# Patient Record
Sex: FEMALE | Race: BLACK OR AFRICAN AMERICAN | Employment: UNEMPLOYED | URBAN - METROPOLITAN AREA
[De-identification: names, ages, dates, MRNs, and addresses within clinical notes are randomized per-mention and may not be internally consistent; named-entity substitution may affect disease eponyms.]

---

## 2018-08-10 ENCOUNTER — APPOINTMENT (OUTPATIENT)
Dept: GENERAL RADIOLOGY | Age: 48
End: 2018-08-10
Payer: SUBSIDIZED

## 2018-08-10 ENCOUNTER — HOSPITAL ENCOUNTER (EMERGENCY)
Age: 48
Discharge: HOME | End: 2018-08-10
Attending: EMERGENCY MEDICINE | Admitting: EMERGENCY MEDICINE
Payer: SUBSIDIZED

## 2018-08-10 VITALS
TEMPERATURE: 98 F | RESPIRATION RATE: 16 BRPM | BODY MASS INDEX: 44.3 KG/M2 | HEIGHT: 64 IN | DIASTOLIC BLOOD PRESSURE: 85 MMHG | WEIGHT: 259.48 LBS | SYSTOLIC BLOOD PRESSURE: 162 MMHG | HEART RATE: 72 BPM | OXYGEN SATURATION: 100 %

## 2018-08-10 DIAGNOSIS — M54.10 RADICULOPATHY OF LEG: ICD-10-CM

## 2018-08-10 DIAGNOSIS — M16.11 PRIMARY OSTEOARTHRITIS OF RIGHT HIP: ICD-10-CM

## 2018-08-10 DIAGNOSIS — M25.551 PAIN OF RIGHT HIP JOINT: Primary | ICD-10-CM

## 2018-08-10 PROCEDURE — 99283 EMERGENCY DEPT VISIT LOW MDM: CPT

## 2018-08-10 PROCEDURE — 74011250637 HC RX REV CODE- 250/637: Performed by: PHYSICIAN ASSISTANT

## 2018-08-10 PROCEDURE — 73502 X-RAY EXAM HIP UNI 2-3 VIEWS: CPT

## 2018-08-10 RX ORDER — KETOROLAC TROMETHAMINE 10 MG/1
10 TABLET, FILM COATED ORAL
Status: COMPLETED | OUTPATIENT
Start: 2018-08-10 | End: 2018-08-10

## 2018-08-10 RX ORDER — PREDNISONE 10 MG/1
TABLET ORAL
Qty: 21 TAB | Refills: 0 | Status: SHIPPED | OUTPATIENT
Start: 2018-08-10 | End: 2019-03-11

## 2018-08-10 RX ORDER — OXYCODONE HYDROCHLORIDE 5 MG/1
5 TABLET ORAL
Qty: 12 TAB | Refills: 0 | Status: SHIPPED | OUTPATIENT
Start: 2018-08-10 | End: 2019-03-11

## 2018-08-10 RX ORDER — MELOXICAM 15 MG/1
15 TABLET ORAL DAILY
Qty: 10 TAB | Refills: 0 | Status: SHIPPED | OUTPATIENT
Start: 2018-08-10 | End: 2018-08-20

## 2018-08-10 RX ADMIN — KETOROLAC TROMETHAMINE 10 MG: 10 TABLET, FILM COATED ORAL at 12:15

## 2018-08-10 NOTE — ED TRIAGE NOTES
Assumed care of pt from triage. Pt is A&O x 4. Pt reports CC of hip pain x 3 weeks. Pt denies any injury to hip. Pt reports she has been taking HA medicine without relief. Pt reports she can not get into PCP until Sept. Pt resting on stretcher in POC.

## 2018-08-10 NOTE — DISCHARGE INSTRUCTIONS
Hip Arthritis: Care Instructions  Your Care Instructions    Arthritis, also called osteoarthritis, is a breakdown of the tissue (cartilage) that cushions your joints. Many people have some arthritis as they age. When the cartilage in your hip joints wears down, your hip bone rubs against the hip socket. This causes pain and stiffness. Work with your doctor to find the right mix of treatments for your arthritis. There are things you can do at home to protect your hip joints, ease your pain, and help you stay active. But if your arthritis becomes so bad that you cannot walk, you may need surgery to replace the hip joint. Follow-up care is a key part of your treatment and safety. Be sure to make and go to all appointments, and call your doctor if you are having problems. It's also a good idea to know your test results and keep a list of the medicines you take. How can you care for yourself at home? · Stay at a healthy weight. Being overweight puts extra strain on your hip joints. · Talk to your doctor or physical therapist about exercises that will help ease hip pain. These tips may help. ¨ Stretch to help prevent stiffness and to prevent injury before you exercise. You may enjoy gentle forms of yoga to help keep your joints and muscles flexible. ¨ Walk instead of jog. Other types of exercise that are less stressful on the joints include riding a bike, swimming, and doing water exercise. ¨ Lift weights. Strong muscles help reduce stress on your joints. Stronger thigh muscles, for example, take some of the stress off of the knees and hips. Learn the right way to lift weights so you do not make joint pain worse. · Take pain medicines exactly as directed. ¨ If the doctor gave you a prescription medicine for pain, take it as prescribed. ¨ If you are not taking a prescription pain medicine, ask your doctor if you can take an over-the-counter medicine.   · Use a cane, crutch, walker, or another device if you need help to get around. These can help rest your hips. You also can use other things to make life easier, such as a higher toilet seat. · Do not sit in low chairs, which can make it painful to get up. · Put heat or cold on your sore hips as needed. Use whichever helps you most. You also can go back and forth between hot and cold packs. ¨ Apply heat 2 or 3 times a day for 20 to 30 minutes-using a heating pad, hot shower, or hot pack-to relieve pain and stiffness. ¨ Put ice or a cold pack on your sore hips for 10 to 20 minutes at a time to numb the area. Put a thin cloth between the ice and your skin. · Think about talking to your doctor about using capsaicin, a cream you apply to the skin for pain relief. When should you call for help? Call your doctor now or seek immediate medical care if:    · You have sudden swelling, warmth, or pain in any joint.     · You have joint pain and a fever or rash.     · You have such bad pain that you cannot use the joint.    Watch closely for changes in your health, and be sure to contact your doctor if:    · You have mild joint symptoms that continue even with more than 6 weeks of care at home.     · You do not get better as expected.     · You have stomach pain or other problems with your medicine. Where can you learn more? Go to http://sara-annabelle.info/. Enter U932 in the search box to learn more about \"Hip Arthritis: Care Instructions. \"  Current as of: October 10, 2017  Content Version: 11.7  © 6765-4549 [employer]. Care instructions adapted under license by [employer] (which disclaims liability or warranty for this information). If you have questions about a medical condition or this instruction, always ask your healthcare professional. [address] any warranty or liability for your use of this information.          Hip Arthritis: Exercises  Your Care Instructions  Here are some examples of exercises for hip arthritis. Start each exercise slowly. Ease off the exercise if you start to have pain. Your doctor or physical therapist will tell you when you can start these exercises and which ones will work best for you. How to do the exercises  Straight-leg raises to the outside    1. Lie on your side, with your affected hip on top. 2. Tighten the front thigh muscles of your top leg to keep your knee straight. 3. Keep your hip and your leg straight in line with the rest of your body, and keep your knee pointing forward. Do not drop your hip back. 4. Lift your top leg straight up toward the ceiling, about 12 inches off the floor. Hold for about 6 seconds, then slowly lower your leg. 5. Repeat 8 to 12 times. 6. Switch legs and repeat steps 1 through 5, even if only one hip is sore. Straight-leg raises to the inside    1. Lie on your side with your affected hip on the floor. 2. You can either prop up your other leg on a chair, or you can bend that knee and put that foot in front of your other knee. Do not drop your hip back. 3. Tighten the muscles on the front thigh of your bottom leg to straighten that knee. 4. Keep your kneecap pointing forward and your leg straight, and lift your bottom leg up toward the ceiling about 6 inches. Hold for about 6 seconds, then lower slowly. 5. Repeat 8 to 12 times. 6. Switch legs and repeat steps 1 through 5, even if only one hip is sore. Hip hike    1. Stand sideways on the bottom step of a staircase, and hold on to the banister or wall. 2. Keeping both knees straight, lift your good leg off the step and let it hang down. Then hike your good hip up to the same level as your affected hip or a little higher. 3. Repeat 8 to 12 times. 4. Switch legs and repeat steps 1 through 3, even if only one hip is sore. Bridging    1. Lie on your back with both knees bent. Your knees should be bent about 90 degrees.   2. Then push your feet into the floor, squeeze your buttocks, and lift your hips off the floor until your shoulders, hips, and knees are all in a straight line. 3. Hold for about 6 seconds as you continue to breathe normally, and then slowly lower your hips back down to the floor and rest for up to 10 seconds. 4. Repeat 8 to 12 times. Hamstring stretch (lying down)    1. Lie flat on your back with your legs straight. If you feel discomfort in your back, place a small towel roll under your lower back. 2. Holding the back of your affected leg, lift your leg straight up and toward your body until you feel a stretch at the back of your thigh. 3. Hold the stretch for at least 30 seconds. 4. Repeat 2 to 4 times. 5. Switch legs and repeat steps 1 through 4, even if only one hip is sore. Standing quadriceps stretch    1. If you are not steady on your feet, hold on to a chair, counter, or wall. You can also lie on your stomach or your side to do this exercise. 2. Bend the knee of the leg you want to stretch, and reach behind you to grab the front of your foot or ankle with the hand on the same side. For example, if you are stretching your right leg, use your right hand. 3. Keeping your knees next to each other, pull your foot toward your buttock until you feel a gentle stretch across the front of your hip and down the front of your thigh. Your knee should be pointed directly to the ground, and not out to the side. 4. Hold the stretch for at least 15 to 30 seconds. 5. Repeat 2 to 4 times. 6. Switch legs and repeat steps 1 through 5, even if only one hip is sore. Hip rotator stretch    1. Lie on your back with both knees bent and your feet flat on the floor. 2. Put the ankle of your affected leg on your opposite thigh near your knee. 3. Use your hand to gently push your knee away from your body until you feel a gentle stretch around your hip. 4. Hold the stretch for 15 to 30 seconds. 5. Repeat 2 to 4 times.   6. Repeat steps 1 through 5, but this time use your hand to gently pull your knee toward your opposite shoulder. 7. Switch legs and repeat steps 1 through 6, even if only one hip is sore. Knee-to-chest    1. Lie on your back with your knees bent and your feet flat on the floor. 2. Bring your affected leg to your chest, keeping the other foot flat on the floor (or keeping the other leg straight, whichever feels better on your lower back). 3. Keep your lower back pressed to the floor. Hold for at least 15 to 30 seconds. 4. Relax, and lower the knee to the starting position. 5. Repeat 2 to 4 times. 6. Switch legs and repeat steps 1 through 5, even if only one hip is sore. 7. To get more stretch, put your other leg flat on the floor while pulling your knee to your chest.  Clamshell    1. Lie on your side, with your affected hip on top. Keep your feet and knees together and your knees bent. 2. Raise your top knee, but keep your feet together. Do not let your hips roll back. Your legs should open up like a clamshell. 3. Hold for 6 seconds. 4. Slowly lower your knee back down. Rest for 10 seconds. 5. Repeat 8 to 12 times. 6. Switch legs and repeat steps 1 through 5, even if only one hip is sore. Follow-up care is a key part of your treatment and safety. Be sure to make and go to all appointments, and call your doctor if you are having problems. It's also a good idea to know your test results and keep a list of the medicines you take. Where can you learn more? Go to http://sara-annabelle.info/. Enter S352 in the search box to learn more about \"Hip Arthritis: Exercises. \"  Current as of: November 29, 2017  Content Version: 11.7  © 2221-6600 [employer]. Care instructions adapted under license by [employer] (which disclaims liability or warranty for this information).  If you have questions about a medical condition or this instruction, always ask your healthcare professional. [employer] disclaims any warranty or liability for your use of this information.

## 2018-08-10 NOTE — ED PROVIDER NOTES
EMERGENCY DEPARTMENT HISTORY AND PHYSICAL EXAM      Date: 8/10/2018  Patient Name: Liv Kwan    History of Presenting Illness     Chief Complaint   Patient presents with    Hip Pain       History Provided By: Patient    HPI: Liv Kwan, 51 y.o. female presents to the Emergency Dept with c/o 3 week h/o R hip pain. Pt denied similar pain in the past. No h/o needing to see an Orthopedist.  Pt denied injury/strain. No change in her physical routine. No dysuria/hematuria. No abdominal/pelvic pain. No rash/lesion. Pt states the pain begins in her R hip and radiates into her R LE. Pt states she has attempted to medicate \"with medicine I take for my headaches\" without relief. Pt is o/w healthy without fever, chills, cough, congestion, ST, shortness of breath, chest pain, N/V/D. Chief Complaint: R hip  Duration: 3 Weeks  Timing:  Acute  Location: R hip  Quality: Aching  Severity: Moderate  Modifying Factors: pain worsens with movement/weight bearing/position changes  Associated Symptoms: denies any other associated signs or symptoms      There are no other complaints, changes, or physical findings at this time. PCP: None    Current Outpatient Prescriptions   Medication Sig Dispense Refill    meloxicam (MOBIC) 15 mg tablet Take 1 Tab by mouth daily for 10 days. 10 Tab 0    oxyCODONE IR (ROXICODONE) 5 mg immediate release tablet Take 1 Tab by mouth every eight (8) hours as needed for Pain for up to 12 doses.  Max Daily Amount: 15 mg. 12 Tab 0    predniSONE (STERAPRED DS) 10 mg dose pack Take as directed 21 Tab 0       Past History     Past Medical History:  Past Medical History:   Diagnosis Date    Chronic kidney disease     kidney failure-left    Depression     GERD (gastroesophageal reflux disease)     Grave's disease     Hypertension     Hypothyroidism following radioiodine therapy     2009    Liver disease     Morbid obesity ([facility] [address]     Nausea & vomiting     Obstructive sleep apnea (adult) (pediatric) 1/8/2013    Thyroid disease        Past Surgical History:  Past Surgical History:   Procedure Laterality Date    HX CHOLECYSTECTOMY      HX GYN      novasure    HX ORTHOPAEDIC      Left arm    HX UROLOGICAL      bladder sling       Family History:  Family History   Problem Relation Age of Onset    Hypertension Mother     Hypertension Father     Other Father     Heart Attack Father     Diabetes Sister     Hypertension Brother        Social History:  Social History   Substance Use Topics    Smoking status: Current Some Day Smoker     Packs/day: 0.50     Last attempt to quit: 1/3/2014    Smokeless tobacco: Never Used    Alcohol use No      Comment: rare, 1 drink a month       Allergies: Allergies   Allergen Reactions    Ace Inhibitors Cough    Other Medication Unable to [employer] names, 1 for sinus,1 for bp and 1 for bladder pt states it is called \"sentura\"    Penicillins Rash    Sanctura [Trospium] Other (comments)    Vesicare [Solifenacin] Rash and Other (comments)         Review of Systems   Review of Systems   Constitutional: Negative for chills and fever. HENT: Negative for congestion, rhinorrhea and sore throat. Respiratory: Negative for cough and shortness of breath. Cardiovascular: Negative for chest pain and palpitations. Gastrointestinal: Negative for abdominal pain, diarrhea, nausea and vomiting. Endocrine: Negative for polydipsia, polyphagia and polyuria. Genitourinary: Negative for dysuria and hematuria. Musculoskeletal: Positive for arthralgias. Negative for neck pain and neck stiffness. Skin: Negative for rash and wound. Allergic/Immunologic: Negative for food allergies and immunocompromised state. Neurological: Positive for numbness. Negative for dizziness, weakness and headaches. See HPI   Psychiatric/Behavioral: Negative for agitation and confusion.        Physical Exam   Physical Exam   Constitutional: She is oriented to person, place, and time. She appears well-developed and well-nourished. No distress. WDWN AA female, alert, in NAD   HENT:   Head: Normocephalic and atraumatic. Nose: Nose normal.   Mouth/Throat: Oropharynx is clear and moist. No oropharyngeal exudate. Eyes: Conjunctivae and EOM are normal. Right eye exhibits no discharge. Left eye exhibits no discharge. No scleral icterus. Neck: Normal range of motion. Neck supple. No JVD present. No tracheal deviation present. No thyromegaly present. Cardiovascular: Normal rate, regular rhythm and normal heart sounds. Pulmonary/Chest: Effort normal and breath sounds normal. No respiratory distress. She has no wheezes. Abdominal: Soft. There is no tenderness. No CVAT   Musculoskeletal: She exhibits tenderness. She exhibits no edema. Decreased A/P ROM to R hip due to tenderness with palpation/movement, no deformity, no crepitus, no erythema/rash/lesion/heat. +SLR R, 2+ distal pulses, NVI, sensation grossly intact to light touch. Increased tenderness noted with position changes/ambulation. Lymphadenopathy:     She has no cervical adenopathy. Neurological: She is alert and oriented to person, place, and time. She exhibits normal muscle tone. Coordination normal.   Skin: Skin is warm and dry. No rash noted. She is not diaphoretic. No erythema. Psychiatric: She has a normal mood and affect. Her behavior is normal. Judgment normal.   Nursing note and vitals reviewed. Diagnostic Study Results     Labs -   No results found for this or any previous visit (from the past 12 hour(s)). Radiologic Studies -   XR HIP RT W OR WO PELV 2-3 VWS   Final Result           Kenneth Davis #[id number]  (48 y.o.  F) 05             Lab Results      None       Imaging Results           XR HIP RT W OR WO PELV 2-3 VWS (Final result) Result time: 08/10/18 13:03:29     Final result by Stefan Parra Results In (08/10/18 13:01:44)     Initial Result:     Impression:     IMPRESSION:  No acute abnormality. Osteoarthritis.           Narrative:     EXAM:  XR HIP RT W OR WO PELV 2-3 VWS    INDICATION:   Right hip pain x3 weeks.      COMPARISON: None. FINDINGS: AP view of the pelvis and a frogleg lateral view of the right hip  demonstrate no fracture, dislocation or other acute abnormality.  There is  osteoarthritic marginal spurring and slight joint space narrowing.              ECG Results      None               Medical Decision Making   I am the first provider for this patient. I reviewed the vital signs, available nursing notes, past medical history, past surgical history, family history and social history. Vital Signs-Reviewed the patient's vital signs. Patient Vitals for the past 12 hrs:   Temp Pulse Resp BP SpO2   08/10/18 1146 98 °F (36.7 °C) 72 16 162/85 100 %           Records Reviewed: Nursing Notes, Old Medical Records, Previous Radiology Studies and Previous Laboratory Studies    Provider Notes (Medical Decision Making):   Strain, sciatica, spasm, SI joint dysfunction, OA, fx, DJD    ED Course:   Initial assessment performed. The patients presenting problems have been discussed, and they are in agreement with the care plan formulated and outlined with them. I have encouraged them to ask questions as they arise throughout their visit. DISCHARGE NOTE:  1330  The care plan has been outline with the patient and/or family, who verbally conveyed understanding and agreement. Available results have been reviewed. Patient and/or family understand the follow up plan as outlined and discharge instructions. Should their condition deterioration at any time after discharge the patient agrees to return, follow up sooner than outlined or seek medical assistance at the closest Emergency Room as soon as possible. Questions have been answered.  Discharge instructions and educational information regarding the patient's diagnosis as well a list of reasons why the patient would want to seek immediate medical attention, should their condition change, were reviewed directly with the patient/family     PLAN:  1. Discharge Medication List as of 8/10/2018  1:15 PM      START taking these medications    Details   meloxicam (MOBIC) 15 mg tablet Take 1 Tab by mouth daily for 10 days. , Print, Disp-10 Tab, R-0      oxyCODONE IR (ROXICODONE) 5 mg immediate release tablet Take 1 Tab by mouth every eight (8) hours as needed for Pain for up to 12 doses. Max Daily Amount: 15 mg., Print, Disp-12 Tab, R-0      predniSONE (STERAPRED DS) 10 mg dose pack Take as directed, Print, Disp-21 Tab, R-0           2. Follow-up Information     Follow up With Details Comments [address], [place]   [address] [address]  [facility]  [phone]      [facility] EMERGENCY DEPT  If symptoms worsen [address] [facility] [address]   [address] [id number] [phone]        Return to ED if worse     Diagnosis     Clinical Impression:   1. Pain of right hip joint    2. Primary osteoarthritis of right hip    3.  Radiculopathy of leg

## 2019-03-08 ENCOUNTER — OFFICE VISIT (OUTPATIENT)
Dept: INTERNAL MEDICINE CLINIC | Age: 49
End: 2019-03-08

## 2019-03-08 VITALS
TEMPERATURE: 98.6 F | DIASTOLIC BLOOD PRESSURE: 90 MMHG | HEIGHT: 64 IN | BODY MASS INDEX: 44.39 KG/M2 | HEART RATE: 60 BPM | WEIGHT: 260 LBS | RESPIRATION RATE: 19 BRPM | SYSTOLIC BLOOD PRESSURE: 166 MMHG | OXYGEN SATURATION: 97 %

## 2019-03-08 DIAGNOSIS — G47.33 OSA (OBSTRUCTIVE SLEEP APNEA): ICD-10-CM

## 2019-03-08 DIAGNOSIS — E66.01 MORBID OBESITY (HCC): ICD-10-CM

## 2019-03-08 DIAGNOSIS — M15.9 GENERALIZED OA: ICD-10-CM

## 2019-03-08 DIAGNOSIS — Z76.89 ESTABLISHING CARE WITH NEW DOCTOR, ENCOUNTER FOR: Primary | ICD-10-CM

## 2019-03-08 DIAGNOSIS — N18.30 CHRONIC RENAL DISEASE, STAGE III (HCC): ICD-10-CM

## 2019-03-08 DIAGNOSIS — I12.9 HYPERTENSION, RENAL DISEASE: ICD-10-CM

## 2019-03-08 DIAGNOSIS — E55.9 VITAMIN D DEFICIENCY: ICD-10-CM

## 2019-03-08 DIAGNOSIS — E89.0 HYPOTHYROIDISM FOLLOWING RADIOIODINE THERAPY: ICD-10-CM

## 2019-03-08 DIAGNOSIS — Z12.39 BREAST CANCER SCREENING: ICD-10-CM

## 2019-03-08 RX ORDER — IBUPROFEN 800 MG/1
800 TABLET ORAL
Qty: 60 TAB | Refills: 1 | Status: SHIPPED | OUTPATIENT
Start: 2019-03-08 | End: 2019-08-05 | Stop reason: SDUPTHER

## 2019-03-08 RX ORDER — LEVOTHYROXINE SODIUM 300 UG/1
300 TABLET ORAL
Qty: 60 TAB | Refills: 2 | Status: SHIPPED | OUTPATIENT
Start: 2019-03-08 | End: 2020-08-05 | Stop reason: SDUPTHER

## 2019-03-08 RX ORDER — INDAPAMIDE 2.5 MG/1
2.5 TABLET, FILM COATED ORAL DAILY
Qty: 60 TAB | Refills: 2 | Status: SHIPPED | OUTPATIENT
Start: 2019-03-08 | End: 2019-04-19 | Stop reason: SDUPTHER

## 2019-03-08 RX ORDER — LEVOTHYROXINE SODIUM 25 UG/1
25 TABLET ORAL
Qty: 60 TAB | Refills: 2 | Status: SHIPPED | OUTPATIENT
Start: 2019-03-08 | End: 2019-06-05 | Stop reason: SDUPTHER

## 2019-03-08 NOTE — PROGRESS NOTES
Yvette Emanuel is a 51 y.o. female and presents with Establish Care  . Subjective:  First visit. Establish Care    PMH-HTN-not on any meds currently. Was controlled, in the past, on indapamide   Hypothyrtoidism   LUCINA-not using CPAP due to its age(from )   ckd 3   Vit d def    PSH-rt elbow surgery   S/p cholecystectomy   BTL   Bladder lift    SH-   Not currently sex active   +tob ~ 1/2 ppd since recently, no alcohol, no illicit drugs   Lives w 2 children and grandchildren 2 and 1 brother (they live w her)    [facility]- mother alive 80 HTN/ emphysema/CHF   Father alive 80 s/p amputation for infection/CAD/CVA/HTN   6 siblings 1 sister killed, 1 sister  DM complications, 1 sister  infx/SLE +HTN/heart dz, DM,   OA    HM  mammo ? ? Colonoscopy ? ? Immunizations declines  Eye care  Dental care  Pap ?? Review of Systems  Constitutional: negative for fevers, chills, anorexia and weight loss  Eyes:   negative for visual disturbance and irritation  ENT:   negative for tinnitus,sore throat,nasal congestion,ear pains. hoarseness  Respiratory:  negative for cough, hemoptysis, dyspnea,wheezing  CV:   negative for chest pain, palpitations, lower extremity edema  GI:   negative for nausea, vomiting, diarrhea, abdominal pain,melena  Musculoskel: negative for myalgias, arthralgias, back pain, muscle weakness, joint pain  Neurological:  negative for headaches, dizziness, vertigo, memory problems and gait   Behavl/Psych: negative for feelings of anxiety, depression, mood changes    Past Medical History:   Diagnosis Date    Chronic kidney disease     kidney failure-left    Depression     GERD (gastroesophageal reflux disease)     Grave's disease     Hypertension     Hypothyroidism following radioiodine therapy     2009    Liver disease     Morbid obesity ([facility] [address]     Nausea & vomiting     Obstructive sleep apnea (adult) (pediatric) 2013    Thyroid disease      Past Surgical History:   Procedure Laterality Date    HX CHOLECYSTECTOMY      HX GYN      novasure    HX ORTHOPAEDIC      Left arm    HX UROLOGICAL      bladder sling     Social History     Socioeconomic History    Marital status:      Spouse name: Not on file    Number of children: Not on file    Years of education: Not on file    Highest education level: Not on file   Tobacco Use    Smoking status: Current Some Day Smoker     Packs/day: 0.50     Last attempt to quit: 1/3/2014     Years since quittin.1    Smokeless tobacco: Never Used   Substance and Sexual Activity    Alcohol use: No     Comment: rare, 1 drink a month    Drug use: No    Sexual activity: Yes     Partners: Male     Family History   Problem Relation Age of Onset    Hypertension Mother     Hypertension Father     Other Father     Heart Attack Father     Diabetes Sister     Hypertension Brother      Current Outpatient Medications   Medication Sig Dispense Refill    indapamide (LOZOL) 2.5 mg tablet Take 1 Tab by mouth daily. 60 Tab 2    levothyroxine (SYNTHROID) 300 mcg tablet Take 1 Tab by mouth Daily (before breakfast). 60 Tab 2    levothyroxine (SYNTHROID) 25 mcg tablet Take 1 Tab by mouth Daily (before breakfast). 60 Tab 2    ibuprofen (MOTRIN) 800 mg tablet Take 1 Tab by mouth every eight (8) hours as needed for Pain (pain).  TAKE WITH FOOD 60 Tab 1     Allergies   Allergen Reactions    Ace Inhibitors Cough    Other Medication Unable to [employer] names, 1 for sinus,1 for bp and 1 for bladder pt states it is called \"sentura\"    Penicillins Rash    Sanctura [Trospium] Other (comments)    Tudorza Pressair [Aclidinium Bromide] Swelling    Vesicare [Solifenacin] Rash and Other (comments)       Objective:  Visit Vitals  /90 (BP 1 Location: Left arm, BP Patient Position: Sitting)   Pulse 60   Temp 98.6 °F (37 °C) (Oral)   Resp 19   Ht 5' 3.5\" (1.613 m)   Wt 260 lb (117.9 kg)   SpO2 97%   BMI 45.33 kg/m²     Physical Exam: General appearance - alert, obese, and in no distress  Mental status - alert, oriented to person, place, and time  EYE-MAX, EOMI, corneas normal, no foreign bodies  ENT-ENT exam normal, no neck nodes or sinus tenderness  Mouth - mucous membranes moist, pharynx normal without lesions  Neck - supple, no significant adenopathy   Chest - clear to auscultation, no wheezes, rales or rhonchi, symmetric air entry   Heart - normal rate, regular rhythm, normal S1, S2, 2/6 systolicmurmur  Abdomen - soft, nontender, obese +bs  Ext-pobese  Skin-Warm and dry. no hyperpigmentation, vitiligo, or suspicious lesions  Neuro -alert, oriented, normal speech, no focal findings or movement disorder noted      Results for orders placed or performed in visit on 03/08/19   LIPID PANEL   Result Value Ref Range    Cholesterol, total 204 (H) 100 - 199 mg/dL    Triglyceride 88 0 - 149 mg/dL    HDL Cholesterol 62 >39 mg/dL    VLDL, calculated 18 5 - 40 mg/dL    LDL, calculated 124 (H) 0 - 99 mg/dL   METABOLIC PANEL, COMPREHENSIVE   Result Value Ref Range    Glucose 77 65 - 99 mg/dL    BUN 13 6 - 24 mg/dL    Creatinine 1.22 (H) 0.57 - 1.00 mg/dL    GFR est non-AA 52 (L) >59 mL/min/1.73    GFR est AA 60 >59 mL/min/1.73    BUN/Creatinine ratio 11 9 - 23    Sodium 140 134 - 144 mmol/L    Potassium 4.3 3.5 - 5.2 mmol/L    Chloride 99 96 - 106 mmol/L    CO2 27 20 - 29 mmol/L    Calcium 9.6 8.7 - 10.2 mg/dL    Protein, total 7.0 6.0 - 8.5 g/dL    Albumin 4.6 3.5 - 5.5 g/dL    GLOBULIN, TOTAL 2.4 1.5 - 4.5 g/dL    A-G Ratio 1.9 1.2 - 2.2    Bilirubin, total 0.3 0.0 - 1.2 mg/dL    Alk.  phosphatase 56 39 - 117 IU/L    AST (SGOT) 20 0 - 40 IU/L    ALT (SGPT) 14 0 - 32 IU/L   CBC W/O DIFF   Result Value Ref Range    WBC 6.7 3.4 - 10.8 x10E3/uL    RBC 3.67 (L) 3.77 - 5.28 x10E6/uL    HGB 10.9 (L) 11.1 - 15.9 g/dL    HCT 32.9 (L) 34.0 - 46.6 %    MCV 90 79 - 97 fL    MCH 29.7 26.6 - 33.0 pg    MCHC 33.1 31.5 - 35.7 g/dL    RDW 15.5 (H) 12.3 - 15.4 % PLATELET 340 833 - 636 x10E3/uL   HEMOGLOBIN A1C WITH EAG   Result Value Ref Range    Hemoglobin A1c 5.2 4.8 - 5.6 %    Estimated average glucose 103 mg/dL   TSH REFLEX TO T4   Result Value Ref Range    TSH 35.000 (H) 0.450 - 4.500 uIU/mL   CKD REPORT   Result Value Ref Range    Interpretation Note    VITAMIN D, 25 HYDROXY   Result Value Ref Range    VITAMIN D, 25-HYDROXY 14.4 (L) 30.0 - 100.0 ng/mL   THYROXINE (T4)   Result Value Ref Range    T4, Total 3.1 (L) 4.5 - 12.0 ug/dL   CVD REPORT   Result Value Ref Range    INTERPRETATION Note     PDF IMAGE Not applicable        Assessment/Plan:    ICD-10-CM ICD-9-CM    1. Establishing care with new doctor, encounter for Z76.89 V65.8    2. LUCINA (obstructive sleep apnea) G47.33 327.23 REFERRAL TO SLEEP STUDIES   3. Hypertension, renal disease I12.9 403.90 indapamide (LOZOL) 2.5 mg tablet     585.9 LIPID PANEL      METABOLIC PANEL, COMPREHENSIVE      CBC W/O DIFF   4. Chronic renal disease, stage III (HCC) N18.3 585.3    5. Hypothyroidism following radioiodine therapy E89.0 244.1 levothyroxine (SYNTHROID) 300 mcg tablet      levothyroxine (SYNTHROID) 25 mcg tablet      TSH REFLEX TO T4   6. Morbid obesity ([address] E66.01 278.01 HEMOGLOBIN A1C WITH EAG   7. Vitamin D deficiency E55.9 268.9 VITAMIN D, 25 HYDROXY   8. Generalized OA M15.9 715.00 ibuprofen (MOTRIN) 800 mg tablet   9. Breast cancer screening Z12.31 V76.10 NAYANA 3D EMMANUEL W MAMMO BI SCREENING INCL CAD     Orders Placed This Encounter    NAYANA 3D EMMANUEL W MAMMO BI SCREENING INCL CAD     Standing Status:   Future     Standing Expiration Date:   6/8/2019     Order Specific Question:   Reason for Exam     Answer:   screen     Order Specific Question:   Is Patient Pregnant?      Answer:   No    LIPID PANEL    METABOLIC PANEL, COMPREHENSIVE    CBC W/O DIFF    HEMOGLOBIN A1C WITH EAG    TSH REFLEX TO T4    VITAMIN D, 25 HYDROXY     Standing Status:   Future     Number of Occurrences:   1     Standing Expiration Date: 6/8/2019    CKD REPORT    VITAMIN D, 25 HYDROXY    THYROXINE (T4)    CVD REPORT    REFERRAL TO SLEEP STUDIES     Referral Priority:   Routine     Referral Type:   Consultation     Referral Reason:   Specialty Services Required     Referred to Provider:   Gadiel Davis MD     Requested Specialty:   Sleep Medicine     Number of Visits Requested:   1    indapamide (LOZOL) 2.5 mg tablet     Sig: Take 1 Tab by mouth daily. Dispense:  60 Tab     Refill:  2    levothyroxine (SYNTHROID) 300 mcg tablet     Sig: Take 1 Tab by mouth Daily (before breakfast). Dispense:  60 Tab     Refill:  2    levothyroxine (SYNTHROID) 25 mcg tablet     Sig: Take 1 Tab by mouth Daily (before breakfast). Dispense:  60 Tab     Refill:  2    ibuprofen (MOTRIN) 800 mg tablet     Sig: Take 1 Tab by mouth every eight (8) hours as needed for Pain (pain). TAKE WITH FOOD     Dispense:  60 Tab     Refill:  1     1. Establishing care with new doctor, encounter for  Completed    2. LUCINA (obstructive sleep apnea)    - REFERRAL TO SLEEP STUDIES    3. Hypertension, renal disease    - indapamide (LOZOL) 2.5 mg tablet; Take 1 Tab by mouth daily. Dispense: 60 Tab; Refill: 2  - LIPID PANEL  - METABOLIC PANEL, COMPREHENSIVE  - CBC W/O DIFF    4. Chronic renal disease, stage III ([facility] [address]  Noted  recheck    5. Hypothyroidism following radioiodine therapy    - levothyroxine (SYNTHROID) 300 mcg tablet; Take 1 Tab by mouth Daily (before breakfast). Dispense: 60 Tab; Refill: 2  - levothyroxine (SYNTHROID) 25 mcg tablet; Take 1 Tab by mouth Daily (before breakfast). Dispense: 60 Tab; Refill: 2  - TSH REFLEX TO T4    6. Morbid obesity ([facility] [address]    - HEMOGLOBIN A1C WITH EAG    7. Vitamin D deficiency    - VITAMIN D, 25 HYDROXY; Future    8. Generalized OA  Use SPARINGLY  - ibuprofen (MOTRIN) 800 mg tablet; Take 1 Tab by mouth every eight (8) hours as needed for Pain (pain). TAKE WITH FOOD  Dispense: 60 Tab; Refill: 1    9.  Breast cancer screening    - NAYANA 3D EMMANUEL W MAMMO BI SCREENING INCL CAD; Future    Patient Instructions        Eating Healthy Foods: Care Instructions  Your Care Instructions    Eating healthy foods can help lower your risk for disease. Healthy food gives you energy and keeps your heart strong, your brain active, your muscles working, and your bones strong. A healthy diet includes a variety of foods from the basic food groups: grains, vegetables, fruits, milk and milk products, and meat and beans. Some people may eat more of their favorite foods from only one food group and, as a result, miss getting the nutrients they need. So, it is important to pay attention not only to what you eat but also to what you are missing from your diet. You can eat a healthy, balanced diet by making a few small changes. Follow-up care is a key part of your treatment and safety. Be sure to make and go to all appointments, and call your doctor if you are having problems. It's also a good idea to know your test results and keep a list of the medicines you take. How can you care for yourself at home? Look at what you eat  · Keep a food diary for a week or two and record everything you eat or drink. Track the number of servings you eat from each food group. · For a balanced diet every day, eat a variety of:  ? 6 or more ounce-equivalents of grains, such as cereals, breads, crackers, rice, or pasta, every day. An ounce-equivalent is 1 slice of bread, 1 cup of ready-to-eat cereal, or ½ cup of cooked rice, cooked pasta, or cooked cereal.  ? 2½ cups of vegetables, especially:  § Dark-green vegetables such as broccoli and spinach. § Orange vegetables such as carrots and sweet potatoes. § Dry beans (such as pena and kidney beans) and peas (such as lentils). ? 2 cups of fresh, frozen, or canned fruit. A small apple or 1 banana or orange equals 1 cup. ? 3 cups of nonfat or low-fat milk, yogurt, or other milk products.   ? 5½ ounces of meat and beans, such as chicken, fish, lean meat, beans, nuts, and seeds. One egg, 1 tablespoon of peanut butter, ½ ounce nuts or seeds, or ¼ cup of cooked beans equals 1 ounce of meat. · Learn how to read food labels for serving sizes and ingredients. Fast-food and convenience-food meals often contain few or no fruits or vegetables. Make sure you eat some fruits and vegetables to make the meal more nutritious. · Look at your food diary. For each food group, add up what you have eaten and then divide the total by the number of days. This will give you an idea of how much you are eating from each food group. See if you can find some ways to change your diet to make it more healthy. Start small  · Do not try to make dramatic changes to your diet all at once. You might feel that you are missing out on your favorite foods and then be more likely to fail. · Start slowly, and gradually change your habits. Try some of the following:  ? Use whole wheat bread instead of white bread. ? Use nonfat or low-fat milk instead of whole milk. ? Eat brown rice instead of white rice, and eat whole wheat pasta instead of white-flour pasta. ? Try low-fat cheeses and low-fat yogurt. ? Add more fruits and vegetables to meals and have them for snacks. ? Add lettuce, tomato, cucumber, and onion to sandwiches. ? Add fruit to yogurt and cereal.  Enjoy food  · You can still eat your favorite foods. You just may need to eat less of them. If your favorite foods are high in fat, salt, and sugar, limit how often you eat them, but do not cut them out entirely. · Eat a wide variety of foods. Make healthy choices when eating out  · The type of restaurant you choose can help you make healthy choices. Even fast-food chains are now offering more low-fat or healthier choices on the menu. · Choose smaller portions, or take half of your meal home. · When eating out, try:  ? A veggie pizza with a whole wheat crust or grilled chicken (instead of sausage or pepperoni).   ? Pasta with roasted vegetables, grilled chicken, or marinara sauce instead of cream sauce. ? A vegetable wrap or grilled chicken wrap. ? Broiled or poached food instead of fried or breaded items. Make healthy choices easy  · Buy packaged, prewashed, ready-to-eat fresh vegetables and fruits, such as baby carrots, salad mixes, and chopped or shredded broccoli and cauliflower. · Buy packaged, presliced fruits, such as melon or pineapple. · Choose 100% fruit or vegetable juice instead of soda. Limit juice intake to 4 to 6 oz (½ to ¾ cup) a day. · Blend low-fat yogurt, fruit juice, and canned or frozen fruit to make a smoothie for breakfast or a snack. Where can you learn more? Go to http://saraBoomsenseannabelle.info/. Enter T756 in the search box to learn more about \"Eating Healthy Foods: Care Instructions. \"  Current as of: March 28, 2018  Content Version: 11.9  © 6420-5652 [employer]. Care instructions adapted under license by [employer] (which disclaims liability or warranty for this information). If you have questions about a medical condition or this instruction, always ask your healthcare professional. [place] [address] any warranty or liability for your use of this information. Follow-up Disposition:  Return in about 6 weeks (around 4/19/2019) for bp check on med and repeat thyroid test on med. I have reviewed with the patient details of the assessment and plan and all questions were answered. Relevent patient education was performed. The most recent lab findings were reviewed with the patient. An After Visit Summary was printed and given to the patient.

## 2019-03-08 NOTE — PATIENT INSTRUCTIONS
Eating Healthy Foods: Care Instructions  Your Care Instructions    Eating healthy foods can help lower your risk for disease. Healthy food gives you energy and keeps your heart strong, your brain active, your muscles working, and your bones strong. A healthy diet includes a variety of foods from the basic food groups: grains, vegetables, fruits, milk and milk products, and meat and beans. Some people may eat more of their favorite foods from only one food group and, as a result, miss getting the nutrients they need. So, it is important to pay attention not only to what you eat but also to what you are missing from your diet. You can eat a healthy, balanced diet by making a few small changes. Follow-up care is a key part of your treatment and safety. Be sure to make and go to all appointments, and call your doctor if you are having problems. It's also a good idea to know your test results and keep a list of the medicines you take. How can you care for yourself at home? Look at what you eat  · Keep a food diary for a week or two and record everything you eat or drink. Track the number of servings you eat from each food group. · For a balanced diet every day, eat a variety of:  ? 6 or more ounce-equivalents of grains, such as cereals, breads, crackers, rice, or pasta, every day. An ounce-equivalent is 1 slice of bread, 1 cup of ready-to-eat cereal, or ½ cup of cooked rice, cooked pasta, or cooked cereal.  ? 2½ cups of vegetables, especially:  § Dark-green vegetables such as broccoli and spinach. § Orange vegetables such as carrots and sweet potatoes. § Dry beans (such as pena and kidney beans) and peas (such as lentils). ? 2 cups of fresh, frozen, or canned fruit. A small apple or 1 banana or orange equals 1 cup. ? 3 cups of nonfat or low-fat milk, yogurt, or other milk products. ? 5½ ounces of meat and beans, such as chicken, fish, lean meat, beans, nuts, and seeds.  One egg, 1 tablespoon of peanut butter, ½ ounce nuts or seeds, or ¼ cup of cooked beans equals 1 ounce of meat. · Learn how to read food labels for serving sizes and ingredients. Fast-food and convenience-food meals often contain few or no fruits or vegetables. Make sure you eat some fruits and vegetables to make the meal more nutritious. · Look at your food diary. For each food group, add up what you have eaten and then divide the total by the number of days. This will give you an idea of how much you are eating from each food group. See if you can find some ways to change your diet to make it more healthy. Start small  · Do not try to make dramatic changes to your diet all at once. You might feel that you are missing out on your favorite foods and then be more likely to fail. · Start slowly, and gradually change your habits. Try some of the following:  ? Use whole wheat bread instead of white bread. ? Use nonfat or low-fat milk instead of whole milk. ? Eat brown rice instead of white rice, and eat whole wheat pasta instead of white-flour pasta. ? Try low-fat cheeses and low-fat yogurt. ? Add more fruits and vegetables to meals and have them for snacks. ? Add lettuce, tomato, cucumber, and onion to sandwiches. ? Add fruit to yogurt and cereal.  Enjoy food  · You can still eat your favorite foods. You just may need to eat less of them. If your favorite foods are high in fat, salt, and sugar, limit how often you eat them, but do not cut them out entirely. · Eat a wide variety of foods. Make healthy choices when eating out  · The type of restaurant you choose can help you make healthy choices. Even fast-food chains are now offering more low-fat or healthier choices on the menu. · Choose smaller portions, or take half of your meal home. · When eating out, try:  ? A veggie pizza with a whole wheat crust or grilled chicken (instead of sausage or pepperoni).   ? Pasta with roasted vegetables, grilled chicken, or marinara sauce instead of cream sauce. ? A vegetable wrap or grilled chicken wrap. ? Broiled or poached food instead of fried or breaded items. Make healthy choices easy  · Buy packaged, prewashed, ready-to-eat fresh vegetables and fruits, such as baby carrots, salad mixes, and chopped or shredded broccoli and cauliflower. · Buy packaged, presliced fruits, such as melon or pineapple. · Choose 100% fruit or vegetable juice instead of soda. Limit juice intake to 4 to 6 oz (½ to ¾ cup) a day. · Blend low-fat yogurt, fruit juice, and canned or frozen fruit to make a smoothie for breakfast or a snack. Where can you learn more? Go to http://sara-annabelle.info/. Enter T756 in the search box to learn more about \"Eating Healthy Foods: Care Instructions. \"  Current as of: March 28, 2018  Content Version: 11.9  © 0492-8124 [employer]. Care instructions adapted under license by [employer] (which disclaims liability or warranty for this information). If you have questions about a medical condition or this instruction, always ask your healthcare professional. [place] [address] any warranty or liability for your use of this information.

## 2019-03-08 NOTE — PROGRESS NOTES
Chief Complaint   Patient presents with   [address] [place] [address]     1. Have you been to the ER, urgent care clinic since your last visit? Hospitalized since your last visit? No    2. Have you seen or consulted any other health care providers outside of the [address] [place] [address] since your last visit? Include any pap smears or colon screening.  No

## 2019-03-09 LAB
25(OH)D3+25(OH)D2 SERPL-MCNC: 14.4 NG/ML (ref 30–100)
ALBUMIN SERPL-MCNC: 4.6 G/DL (ref 3.5–5.5)
ALBUMIN/GLOB SERPL: 1.9 {RATIO} (ref 1.2–2.2)
ALP SERPL-CCNC: 56 IU/L (ref 39–117)
ALT SERPL-CCNC: 14 IU/L (ref 0–32)
AST SERPL-CCNC: 20 IU/L (ref 0–40)
BILIRUB SERPL-MCNC: 0.3 MG/DL (ref 0–1.2)
BUN SERPL-MCNC: 13 MG/DL (ref 6–24)
BUN/CREAT SERPL: 11 (ref 9–23)
CALCIUM SERPL-MCNC: 9.6 MG/DL (ref 8.7–10.2)
CHLORIDE SERPL-SCNC: 99 MMOL/L (ref 96–106)
CHOLEST SERPL-MCNC: 204 MG/DL (ref 100–199)
CO2 SERPL-SCNC: 27 MMOL/L (ref 20–29)
CREAT SERPL-MCNC: 1.22 MG/DL (ref 0.57–1)
ERYTHROCYTE [DISTWIDTH] IN BLOOD BY AUTOMATED COUNT: 15.5 % (ref 12.3–15.4)
EST. AVERAGE GLUCOSE BLD GHB EST-MCNC: 103 MG/DL
GLOBULIN SER CALC-MCNC: 2.4 G/DL (ref 1.5–4.5)
GLUCOSE SERPL-MCNC: 77 MG/DL (ref 65–99)
HBA1C MFR BLD: 5.2 % (ref 4.8–5.6)
HCT VFR BLD AUTO: 32.9 % (ref 34–46.6)
HDLC SERPL-MCNC: 62 MG/DL
HGB BLD-MCNC: 10.9 G/DL (ref 11.1–15.9)
INTERPRETATION, 910389: NORMAL
INTERPRETATION: NORMAL
LDLC SERPL CALC-MCNC: 124 MG/DL (ref 0–99)
MCH RBC QN AUTO: 29.7 PG (ref 26.6–33)
MCHC RBC AUTO-ENTMCNC: 33.1 G/DL (ref 31.5–35.7)
MCV RBC AUTO: 90 FL (ref 79–97)
PDF IMAGE, 910387: NORMAL
PLATELET # BLD AUTO: 315 X10E3/UL (ref 150–379)
POTASSIUM SERPL-SCNC: 4.3 MMOL/L (ref 3.5–5.2)
PROT SERPL-MCNC: 7 G/DL (ref 6–8.5)
RBC # BLD AUTO: 3.67 X10E6/UL (ref 3.77–5.28)
SODIUM SERPL-SCNC: 140 MMOL/L (ref 134–144)
T4 SERPL-MCNC: 3.1 UG/DL (ref 4.5–12)
TRIGL SERPL-MCNC: 88 MG/DL (ref 0–149)
TSH SERPL DL<=0.005 MIU/L-ACNC: 35 UIU/ML (ref 0.45–4.5)
VLDLC SERPL CALC-MCNC: 18 MG/DL (ref 5–40)
WBC # BLD AUTO: 6.7 X10E3/UL (ref 3.4–10.8)

## 2019-03-11 PROBLEM — D64.9 ANEMIA, NORMOCYTIC NORMOCHROMIC: Status: ACTIVE | Noted: 2019-03-11

## 2019-03-11 PROBLEM — M15.9 GENERALIZED OA: Status: ACTIVE | Noted: 2019-03-11

## 2019-03-22 ENCOUNTER — HOSPITAL ENCOUNTER (OUTPATIENT)
Dept: MAMMOGRAPHY | Age: 49
Discharge: HOME | End: 2019-03-22
Payer: MEDICAID

## 2019-03-22 DIAGNOSIS — Z12.39 BREAST CANCER SCREENING: ICD-10-CM

## 2019-03-22 PROCEDURE — 77063 BREAST TOMOSYNTHESIS BI: CPT

## 2019-04-19 ENCOUNTER — OFFICE VISIT (OUTPATIENT)
Dept: INTERNAL MEDICINE CLINIC | Age: 49
End: 2019-04-19

## 2019-04-19 VITALS
HEIGHT: 64 IN | HEART RATE: 68 BPM | TEMPERATURE: 98.5 F | WEIGHT: 257 LBS | RESPIRATION RATE: 19 BRPM | BODY MASS INDEX: 43.87 KG/M2 | OXYGEN SATURATION: 99 % | DIASTOLIC BLOOD PRESSURE: 89 MMHG | SYSTOLIC BLOOD PRESSURE: 173 MMHG

## 2019-04-19 DIAGNOSIS — Z12.11 COLON CANCER SCREENING: ICD-10-CM

## 2019-04-19 DIAGNOSIS — G47.33 OSA (OBSTRUCTIVE SLEEP APNEA): ICD-10-CM

## 2019-04-19 DIAGNOSIS — I10 ESSENTIAL HYPERTENSION: Primary | ICD-10-CM

## 2019-04-19 DIAGNOSIS — I12.9 HYPERTENSION, RENAL DISEASE: ICD-10-CM

## 2019-04-19 DIAGNOSIS — D64.9 ANEMIA, NORMOCYTIC NORMOCHROMIC: ICD-10-CM

## 2019-04-19 DIAGNOSIS — E89.0 HYPOTHYROIDISM FOLLOWING RADIOIODINE THERAPY: Chronic | ICD-10-CM

## 2019-04-19 DIAGNOSIS — E66.01 MORBID OBESITY (HCC): ICD-10-CM

## 2019-04-19 DIAGNOSIS — F17.200 SMOKER: ICD-10-CM

## 2019-04-19 DIAGNOSIS — F41.9 ANXIETY: ICD-10-CM

## 2019-04-19 DIAGNOSIS — E55.9 VITAMIN D DEFICIENCY: ICD-10-CM

## 2019-04-19 DIAGNOSIS — R07.9 CHEST PAIN, UNSPECIFIED TYPE: ICD-10-CM

## 2019-04-19 RX ORDER — LITHIUM CARBONATE 150 MG/1
CAPSULE ORAL 3 TIMES DAILY
COMMUNITY
End: 2019-04-19

## 2019-04-19 RX ORDER — BUSPIRONE HYDROCHLORIDE 7.5 MG/1
7.5 TABLET ORAL 3 TIMES DAILY
Qty: 90 TAB | Refills: 1 | Status: SHIPPED | OUTPATIENT
Start: 2019-04-19 | End: 2019-07-27 | Stop reason: SDUPTHER

## 2019-04-19 RX ORDER — INDAPAMIDE 2.5 MG/1
5 TABLET, FILM COATED ORAL DAILY
Qty: 60 TAB | Refills: 2 | Status: SHIPPED | OUTPATIENT
Start: 2019-04-19 | End: 2019-08-27 | Stop reason: SDUPTHER

## 2019-04-19 NOTE — PROGRESS NOTES
Chief Complaint   Patient presents with    Hypertension     Pt taking meds as prescribed     1. Have you been to the ER, urgent care clinic since your last visit? Hospitalized since your last visit? No    2. Have you seen or consulted any other health care providers outside of the [address] [place] [address] since your last visit? Include any pap smears or colon screening.  No

## 2019-04-19 NOTE — PROGRESS NOTES
Di Spence is a 51 y.o. female and presents with Hypertension (Pt taking meds as prescribed)  . Subjective: For bp check. Pt rrelays compliance w medication. Pt w h/o anxirty d/o. She has been experiencing attacks since the death of her father recently from DM complications;choking sensation/chest pain/dyspnea. Sxs occur ar rest. Not a/w exertion. Pt has to \"beathe through her sxs. She was previously on a bdz, prescribed by her PCP    PMH-HTN-  BP Readings from Last 3 Encounters:   04/19/19 173/89   03/08/19 166/90   08/10/18 162/85        Hypothyroidism-  Lab Results   Component Value Date/Time    TSH 35.000 (H) 03/08/2019 02:08 PM    TSH 0.016 (L) 02/13/2015 08:42 AM    T4, Free 1.96 (H) 02/13/2015 08:42 AM    T4, Total 3.1 (L) 03/08/2019 02:08 PM        LUCINA-not using CPAP due to its age(from 2012)   Vit d def-  Lab Results   Component Value Date/Time    VITAMIN D, 25-HYDROXY 14.4 (L) 03/08/2019 02:08 PM        Cigarette smoker      STAT EKG-NSR w low voltage NSSTTWA no ischemic changes    Review of Systems  Constitutional: negative for fevers, chills, anorexia and weight loss  Eyes:   negative for visual disturbance and irritation  ENT:   negative for tinnitus,sore throat,nasal congestion,ear pains. hoarseness  Respiratory:  negative for cough, hemoptysis, dyspnea,wheezing  CV:   negative for chest pain, palpitations, lower extremity edema  GI:   negative for nausea, vomiting, diarrhea, abdominal pain,melena  Musculoskel: negative for myalgias, arthralgias, back pain, muscle weakness, joint pain  Neurological:  negative for headaches, dizziness, vertigo, memory problems and gait   Behavl/Psych: negative for feelings of anxiety, depression, mood changes    Past Medical History:   Diagnosis Date    Chronic kidney disease     kidney failure-left    Depression     GERD (gastroesophageal reflux disease)     Grave's disease     Hypertension     Hypothyroidism following radioiodine therapy     2009  Liver disease     Morbid obesity ([facility] [address]     Nausea & vomiting     Obstructive sleep apnea (adult) (pediatric) 2013    Thyroid disease      Past Surgical History:   Procedure Laterality Date    HX CHOLECYSTECTOMY      HX GYN      novasure    HX ORTHOPAEDIC      Left arm    HX UROLOGICAL      bladder sling     Social History     Socioeconomic History    Marital status:      Spouse name: Not on file    Number of children: Not on file    Years of education: Not on file    Highest education level: Not on file   Tobacco Use    Smoking status: Current Some Day Smoker     Packs/day: 0.50     Last attempt to quit: 1/3/2014     Years since quittin.2    Smokeless tobacco: Never Used   Substance and Sexual Activity    Alcohol use: No     Comment: rare, 1 drink a month    Drug use: No    Sexual activity: Yes     Partners: Male     Family History   Problem Relation Age of Onset    Hypertension Mother     Hypertension Father     Other Father     Heart Attack Father     Diabetes Sister     Hypertension Brother      Current Outpatient Medications   Medication Sig Dispense Refill    indapamide (LOZOL) 2.5 mg tablet Take 2 Tabs by mouth daily. 60 Tab 2    busPIRone (BUSPAR) 7.5 mg tablet Take 1 Tab by mouth three (3) times daily. 90 Tab 1    levothyroxine (SYNTHROID) 300 mcg tablet Take 1 Tab by mouth Daily (before breakfast). 60 Tab 2    ibuprofen (MOTRIN) 800 mg tablet Take 1 Tab by mouth every eight (8) hours as needed for Pain (pain). TAKE WITH FOOD 60 Tab 1    levothyroxine (SYNTHROID) 25 mcg tablet Take 1 Tab by mouth Daily (before breakfast).  60 Tab 2     Allergies   Allergen Reactions    Ace Inhibitors Cough    Other Medication Unable to [employer] names, 1 for sinus,1 for bp and 1 for bladder pt states it is called \"sentura\"    Penicillins Rash    Sanctura [Trospium] Other (comments)    Tudorza Pressair [Aclidinium Bromide] Swelling    Vesicare [Solifenacin] Rash and Other (comments)       Objective:  Visit Vitals  /89 (BP 1 Location: Left arm, BP Patient Position: Sitting)   Pulse 68   Temp 98.5 °F (36.9 °C) (Oral)   Resp 19   Ht 5' 3.5\" (1.613 m)   Wt 257 lb (116.6 kg)   SpO2 99%   BMI 44.81 kg/m²     Physical Exam:   General appearance - alert, obese, and in no distress  Mental status - alert, oriented to person, place, and time  EYE-MAX, EOMI, corneas normal, no foreign bodies  ENT-ENT exam normal, no neck nodes or sinus tenderness  Mouth - mucous membranes moist, pharynx normal without lesions  Neck - supple, no significant adenopathy   Chest - clear to auscultation, no wheezes, rales or rhonchi, symmetric air entry   Heart - normal rate, regular rhythm, normal S1, S2, 2/6 systolicmurmur  Abdomen - soft, nontender, obese +bs  Ext-pobese  Skin-Warm and dry. no hyperpigmentation, vitiligo, or suspicious lesions  Neuro -alert, oriented, normal speech, no focal findings or movement disorder noted      Results for orders placed or performed in visit on 03/08/19   LIPID PANEL   Result Value Ref Range    Cholesterol, total 204 (H) 100 - 199 mg/dL    Triglyceride 88 0 - 149 mg/dL    HDL Cholesterol 62 >39 mg/dL    VLDL, calculated 18 5 - 40 mg/dL    LDL, calculated 124 (H) 0 - 99 mg/dL   METABOLIC PANEL, COMPREHENSIVE   Result Value Ref Range    Glucose 77 65 - 99 mg/dL    BUN 13 6 - 24 mg/dL    Creatinine 1.22 (H) 0.57 - 1.00 mg/dL    GFR est non-AA 52 (L) >59 mL/min/1.73    GFR est AA 60 >59 mL/min/1.73    BUN/Creatinine ratio 11 9 - 23    Sodium 140 134 - 144 mmol/L    Potassium 4.3 3.5 - 5.2 mmol/L    Chloride 99 96 - 106 mmol/L    CO2 27 20 - 29 mmol/L    Calcium 9.6 8.7 - 10.2 mg/dL    Protein, total 7.0 6.0 - 8.5 g/dL    Albumin 4.6 3.5 - 5.5 g/dL    GLOBULIN, TOTAL 2.4 1.5 - 4.5 g/dL    A-G Ratio 1.9 1.2 - 2.2    Bilirubin, total 0.3 0.0 - 1.2 mg/dL    Alk.  phosphatase 56 39 - 117 IU/L    AST (SGOT) 20 0 - 40 IU/L    ALT (SGPT) 14 0 - 32 IU/L   CBC W/O DIFF Result Value Ref Range    WBC 6.7 3.4 - 10.8 x10E3/uL    RBC 3.67 (L) 3.77 - 5.28 x10E6/uL    HGB 10.9 (L) 11.1 - 15.9 g/dL    HCT 32.9 (L) 34.0 - 46.6 %    MCV 90 79 - 97 fL    MCH 29.7 26.6 - 33.0 pg    MCHC 33.1 31.5 - 35.7 g/dL    RDW 15.5 (H) 12.3 - 15.4 %    PLATELET 694 146 - 401 x10E3/uL   HEMOGLOBIN A1C WITH EAG   Result Value Ref Range    Hemoglobin A1c 5.2 4.8 - 5.6 %    Estimated average glucose 103 mg/dL   TSH REFLEX TO T4   Result Value Ref Range    TSH 35.000 (H) 0.450 - 4.500 uIU/mL   CKD REPORT   Result Value Ref Range    Interpretation Note    VITAMIN D, 25 HYDROXY   Result Value Ref Range    VITAMIN D, 25-HYDROXY 14.4 (L) 30.0 - 100.0 ng/mL   THYROXINE (T4)   Result Value Ref Range    T4, Total 3.1 (L) 4.5 - 12.0 ug/dL   CVD REPORT   Result Value Ref Range    INTERPRETATION Note     PDF IMAGE Not applicable        Assessment/Plan:    ICD-10-CM ICD-9-CM    1. Essential hypertension I10 401.9    2. Hypothyroidism following radioiodine therapy E89.0 244.1 TSH REFLEX TO T4   3. Morbid obesity ([facility] [address] E66.01 278.01    4. LUCINA (obstructive sleep apnea) G47.33 327.23    5. Hypertension, renal disease I12.9 403.90 VITAMIN D, 25 HYDROXY     585.9 indapamide (LOZOL) 2.5 mg tablet   6. Anemia, normocytic normochromic D64.9 285.9    7. Vitamin D deficiency E55.9 268.9    8. Smoker F17.200 305.1    9. Chest pain, unspecified type R07.9 786.50 AMB POC EKG ROUTINE W/ 12 LEADS, INTER & REP   10. Anxiety F41.9 300.00 busPIRone (BUSPAR) 7.5 mg tablet   11.  Colon cancer screening Z12.11 V76.51 REFERRAL TO GASTROENTEROLOGY     Orders Placed This Encounter    TSH REFLEX TO T4    VITAMIN D, 25 HYDROXY     Standing Status:   Future     Standing Expiration Date:   5/19/2019   [address] [facility]     Referral Priority:   Routine     Referral Type:   Consultation     Referral Reason:   Specialty Services Required     Referral Location:   [facility]     Referred to Provider:   Renny Dey MD Number of Visits Requested:   1    AMB POC EKG ROUTINE W/ 12 LEADS, INTER & REP     Order Specific Question:   Reason for Exam:     Answer:   chest pain    DISCONTD: lithium carbonate 150 mg capsule     Sig: Take  by mouth three (3) times daily.  indapamide (LOZOL) 2.5 mg tablet     Sig: Take 2 Tabs by mouth daily. Dispense:  60 Tab     Refill:  2     HIGHER DOSE    busPIRone (BUSPAR) 7.5 mg tablet     Sig: Take 1 Tab by mouth three (3) times daily. Dispense:  90 Tab     Refill:  1       1. Essential hypertension  Doubt compliance  Increase dose of indapamide  I will adjust med regimen if bps still elevated NV  Doubt compliance    2. Hypothyroidism following radioiodine therapy    - TSH REFLEX TO T4    3. Morbid obesity ([address]  Noted    4. LUCINA (obstructive sleep apnea)  Pt will schedule appt for sleep study @ her convenienc    5. Hypertension, renal disease    - VITAMIN D, 25 HYDROXY; Future  - indapamide (LOZOL) 2.5 mg tablet; Take 2 Tabs by mouth daily. Dispense: 60 Tab; Refill: 2    6. Anemia, normocytic normochromic  Mild    7. Vitamin D deficiency  Recheck    8. Smoker  The patient was counseled on the dangers of tobacco use, and was advised to quit. Reviewed strategies to maximize success, including written materials. 9. Chest pain, unspecified type  Related to anxiety  - AMB POC EKG ROUTINE W/ 12 LEADS, INTER & REP    10. Anxiety    - busPIRone (BUSPAR) 7.5 mg tablet; Take 1 Tab by mouth three (3) times daily. Dispense: 90 Tab; Refill: 1    11. Colon cancer screening    - REFERRAL TO GASTROENTEROLOGY          There are no Patient Instructions on file for this visit. Follow-up and Dispositions    · Return in about 6 weeks (around 5/31/2019) for bp check on higher dose. I have reviewed with the patient details of the assessment and plan and all questions were answered. Relevent patient education was performed. The most recent lab findings were reviewed with the patient.     An After Visit Summary was printed and given to the patient.

## 2019-04-20 LAB
25(OH)D3+25(OH)D2 SERPL-MCNC: 21.5 NG/ML (ref 30–100)
T4 SERPL-MCNC: 4 UG/DL (ref 4.5–12)
TSH SERPL DL<=0.005 MIU/L-ACNC: 25.21 UIU/ML (ref 0.45–4.5)

## 2019-06-05 ENCOUNTER — OFFICE VISIT (OUTPATIENT)
Dept: INTERNAL MEDICINE CLINIC | Age: 49
End: 2019-06-05

## 2019-06-05 VITALS
HEART RATE: 66 BPM | DIASTOLIC BLOOD PRESSURE: 85 MMHG | BODY MASS INDEX: 43.87 KG/M2 | RESPIRATION RATE: 19 BRPM | OXYGEN SATURATION: 99 % | SYSTOLIC BLOOD PRESSURE: 150 MMHG | TEMPERATURE: 98.4 F | HEIGHT: 64 IN | WEIGHT: 257 LBS

## 2019-06-05 DIAGNOSIS — I12.9 HYPERTENSION, RENAL DISEASE: Primary | ICD-10-CM

## 2019-06-05 DIAGNOSIS — N18.30 CHRONIC RENAL DISEASE, STAGE III (HCC): ICD-10-CM

## 2019-06-05 DIAGNOSIS — E89.0 HYPOTHYROIDISM FOLLOWING RADIOIODINE THERAPY: ICD-10-CM

## 2019-06-05 DIAGNOSIS — E66.01 MORBID OBESITY (HCC): ICD-10-CM

## 2019-06-05 DIAGNOSIS — G47.33 OSA (OBSTRUCTIVE SLEEP APNEA): ICD-10-CM

## 2019-06-05 DIAGNOSIS — F41.9 ANXIETY DISORDER, UNSPECIFIED TYPE: ICD-10-CM

## 2019-06-05 RX ORDER — LEVOTHYROXINE SODIUM 50 UG/1
50 TABLET ORAL
Qty: 30 TAB | Refills: 3 | Status: SHIPPED | OUTPATIENT
Start: 2019-06-05 | End: 2019-11-06 | Stop reason: ALTCHOICE

## 2019-06-05 NOTE — PROGRESS NOTES
Chief Complaint   Patient presents with    Hypertension     Pt taking BP meds as prescribed       1. Have you been to the ER, urgent care clinic since your last visit? Hospitalized since your last visit? No    2. Have you seen or consulted any other health care providers outside of the [address] [place] [address] since your last visit? Include any pap smears or colon screening.  No

## 2019-06-05 NOTE — PROGRESS NOTES
Jennifer Diego is a 51 y.o. female and presents with Hypertension (Pt taking BP meds as prescribed)  . Subjective: For bp check on higher dose of medication    PMH-HTN-pts indapamide was increased @ last visit  BP Readings from Last 3 Encounters:   06/05/19 150/85   04/19/19 173/89   03/08/19 166/90        Hypothyroidism-pt is currently on 325mcg levothyroxine    -pt was previously on 300 levothyroxine and 25 of liothyronine (cytomel) w/o therapeutic range    Lab Results   Component Value Date/Time    TSH 25.210 (H) 04/19/2019 11:56 AM    TSH 0.016 (L) 02/13/2015 08:42 AM    T4, Free 1.96 (H) 02/13/2015 08:42 AM    T4, Total 4.0 (L) 04/19/2019 11:56 AM        LUCINA-not using CPAP due to its age(from 2012)   Vit d def-  Lab Results   Component Value Date/Time    VITAMIN D, 25-HYDROXY 21.5 (L) 04/19/2019 11:56 AM        Cigarette smoker      Anxiety-MUCH improved on buspar            Review of Systems  Constitutional: negative for fevers, chills, anorexia and weight loss  Eyes:   negative for visual disturbance and irritation  ENT:   negative for tinnitus,sore throat,nasal congestion,ear pains. hoarseness  Respiratory:  negative for cough, hemoptysis, dyspnea,wheezing  CV:   negative for chest pain, palpitations, lower extremity edema  GI:   negative for nausea, vomiting, diarrhea, abdominal pain,melena  Musculoskel: negative for myalgias, arthralgias, back pain, muscle weakness, joint pain  Neurological:  negative for headaches, dizziness, vertigo, memory problems and gait   Behavl/Psych: negative for feelings of anxiety, depression, mood changes    Past Medical History:   Diagnosis Date    Chronic kidney disease     kidney failure-left    Depression     GERD (gastroesophageal reflux disease)     Grave's disease     Hypertension     Hypothyroidism following radioiodine therapy     2009    Liver disease     Morbid obesity ([facility] [address]     Nausea & vomiting     Obstructive sleep apnea (adult) (pediatric) 2013    Thyroid disease      Past Surgical History:   Procedure Laterality Date    HX CHOLECYSTECTOMY      HX GYN      novasure    HX ORTHOPAEDIC      Left arm    HX UROLOGICAL      bladder sling     Social History     Socioeconomic History    Marital status:      Spouse name: Not on file    Number of children: Not on file    Years of education: Not on file    Highest education level: Not on file   Tobacco Use    Smoking status: Current Some Day Smoker     Packs/day: 0.50     Last attempt to quit: 1/3/2014     Years since quittin.4    Smokeless tobacco: Never Used   Substance and Sexual Activity    Alcohol use: No     Comment: rare, 1 drink a month    Drug use: No    Sexual activity: Yes     Partners: Male     Family History   Problem Relation Age of Onset    Hypertension Mother     Hypertension Father     Other Father     Heart Attack Father     Diabetes Sister     Hypertension Brother      Current Outpatient Medications   Medication Sig Dispense Refill    levothyroxine (SYNTHROID) 50 mcg tablet Take 1 Tab by mouth Daily (before breakfast). 30 Tab 3    indapamide (LOZOL) 2.5 mg tablet Take 2 Tabs by mouth daily. 60 Tab 2    busPIRone (BUSPAR) 7.5 mg tablet Take 1 Tab by mouth three (3) times daily. 90 Tab 1    levothyroxine (SYNTHROID) 300 mcg tablet Take 1 Tab by mouth Daily (before breakfast). 60 Tab 2    ibuprofen (MOTRIN) 800 mg tablet Take 1 Tab by mouth every eight (8) hours as needed for Pain (pain).  TAKE WITH FOOD 60 Tab 1     Allergies   Allergen Reactions    Ace Inhibitors Cough    Other Medication Unable to [employer] names, 1 for sinus,1 for bp and 1 for bladder pt states it is called \"sentura\"    Penicillins Rash    Sanctura [Trospium] Other (comments)    Tudorza Pressair [Aclidinium Bromide] Swelling    Vesicare [Solifenacin] Rash and Other (comments)       Objective:  Visit Vitals  /85 (BP 1 Location: Left arm, BP Patient Position: Sitting)   Pulse 66   Temp 98.4 °F (36.9 °C) (Oral)   Resp 19   Ht 5' 3.5\" (1.613 m)   Wt 257 lb (116.6 kg)   SpO2 99%   BMI 44.81 kg/m²     Physical Exam:   General appearance - alert, obese, and in no distress  Mental status - alert, oriented to person, place, and time  EYE-MAX, EOMI, corneas normal, no foreign bodies  ENT-ENT exam normal, no neck nodes or sinus tenderness  Mouth - mucous membranes moist, pharynx normal without lesions  Neck - supple, no significant adenopathy   Chest - clear to auscultation, no wheezes, rales or rhonchi, symmetric air entry   Heart - normal rate, regular rhythm, normal S1, S2, 2/6 systolicmurmur  Abdomen - soft, nontender, obese +bs  Ext-pobese  Skin-Warm and dry. no hyperpigmentation, vitiligo, or suspicious lesions  Neuro -alert, oriented, normal speech, no focal findings or movement disorder noted      Results for orders placed or performed in visit on 04/19/19   TSH REFLEX TO T4   Result Value Ref Range    TSH 25.210 (H) 0.450 - 4.500 uIU/mL   VITAMIN D, 25 HYDROXY   Result Value Ref Range    VITAMIN D, 25-HYDROXY 21.5 (L) 30.0 - 100.0 ng/mL   THYROXINE (T4)   Result Value Ref Range    T4, Total 4.0 (L) 4.5 - 12.0 ug/dL       Assessment/Plan:    ICD-10-CM ICD-9-CM    1. Hypertension, renal disease I12.9 403.90      585.9    2. Hypothyroidism following radioiodine therapy E89.0 244.1 levothyroxine (SYNTHROID) 50 mcg tablet   3. Chronic renal disease, stage III (HCC) N18.3 585.3    4. LUCINA (obstructive sleep apnea) G47.33 327.23    5. Anxiety disorder, unspecified type F41.9 300.00    6. Morbid obesity ([facility] [address] E66.01 278.01      Orders Placed This Encounter    levothyroxine (SYNTHROID) 50 mcg tablet     Sig: Take 1 Tab by mouth Daily (before breakfast). Dispense:  30 Tab     Refill:  3     HIGHER DOSE       1. Hypertension, renal disease  Improved  wiil add second agent NV if still elevated  Pt will be getting new CPAP  2.  Hypothyroidism following radioiodine therapy  Increase dose and recheck NV  - levothyroxine (SYNTHROID) 50 mcg tablet; Take 1 Tab by mouth Daily (before breakfast). Dispense: 30 Tab; Refill: 3    3. Chronic renal disease, stage III (HCC)  Recheck NV    4. LUCINA (obstructive sleep apnea)  Pt has appt w sleep study in July    5. Anxiety disorder, unspecified type  Improved on buspar  Pt declines therapy/psych referral    6. Morbid obesity ([facility] [address]  Noted            There are no Patient Instructions on file for this visit. Follow-up and Dispositions    · Return in about 2 months (around 8/14/2019) for bp, vit d and thyroid f/u. I have reviewed with the patient details of the assessment and plan and all questions were answered. Relevent patient education was performed. The most recent lab findings were reviewed with the patient. An After Visit Summary was printed and given to the patient.

## 2019-06-25 ENCOUNTER — OFFICE VISIT (OUTPATIENT)
Dept: INTERNAL MEDICINE CLINIC | Age: 49
End: 2019-06-25

## 2019-06-25 ENCOUNTER — HOSPITAL ENCOUNTER (OUTPATIENT)
Dept: GENERAL RADIOLOGY | Age: 49
Discharge: HOME | End: 2019-06-25
Payer: MEDICAID

## 2019-06-25 VITALS
TEMPERATURE: 97.4 F | RESPIRATION RATE: 19 BRPM | HEIGHT: 64 IN | SYSTOLIC BLOOD PRESSURE: 170 MMHG | DIASTOLIC BLOOD PRESSURE: 84 MMHG | WEIGHT: 259 LBS | BODY MASS INDEX: 44.22 KG/M2 | HEART RATE: 59 BPM | OXYGEN SATURATION: 97 %

## 2019-06-25 DIAGNOSIS — M54.16 LUMBAR RADICULOPATHY: ICD-10-CM

## 2019-06-25 DIAGNOSIS — I12.9 HYPERTENSION, RENAL DISEASE: ICD-10-CM

## 2019-06-25 DIAGNOSIS — M54.16 LUMBAR RADICULOPATHY: Primary | ICD-10-CM

## 2019-06-25 DIAGNOSIS — E66.01 MORBID OBESITY (HCC): ICD-10-CM

## 2019-06-25 PROCEDURE — 72100 X-RAY EXAM L-S SPINE 2/3 VWS: CPT

## 2019-06-25 RX ORDER — CYCLOBENZAPRINE HCL 10 MG
10 TABLET ORAL
Qty: 21 TAB | Refills: 0 | Status: SHIPPED | OUTPATIENT
Start: 2019-06-25 | End: 2019-08-05 | Stop reason: SDUPTHER

## 2019-06-25 RX ORDER — OXYCODONE AND ACETAMINOPHEN 10; 325 MG/1; MG/1
1 TABLET ORAL
Qty: 20 TAB | Refills: 0 | Status: SHIPPED | OUTPATIENT
Start: 2019-06-25 | End: 2019-07-02

## 2019-06-25 NOTE — PROGRESS NOTES
Sandy Cruz is a 51 y.o. female and presents with Back Pain (and right lower extremties x 1 week)  . Subjective:  Pt w c/o 1 week lbp w radiation down RLE x 1 week. No ppt factor. Since onset, back pain has resolved, but she has developed rt foot numbness w standing for prolonged time and severe pain in her rt leg. Pt denies bowel or bladder dysfunction or le weakness. She describes the leg discomfort as a \"pankaj horse\" in her leg. She has tried ibuprofen 800mg w/o relief. PMH-HTN-on indapamide  BP Readings from Last 3 Encounters:   06/25/19 170/84   06/05/19 150/85   04/19/19 173/89        Hypothyroidism-pt is currently on 325mcg levothyroxine    -pt was previously on 300 levothyroxine and 25 of liothyronine (cytomel) w/o therapeutic range    Lab Results   Component Value Date/Time    TSH 25.210 (H) 04/19/2019 11:56 AM    TSH 0.016 (L) 02/13/2015 08:42 AM    T4, Free 1.96 (H) 02/13/2015 08:42 AM    T4, Total 4.0 (L) 04/19/2019 11:56 AM        LUCINA-not using CPAP due to its age(from 2012)     Vit d def-  Lab Results   Component Value Date/Time    VITAMIN D, 25-HYDROXY 21.5 (L) 04/19/2019 11:56 AM        Cigarette smoker      Anxiety-MUCH improved on buspar     Morbid obesity-  Wt Readings from Last 3 Encounters:   06/25/19 259 lb (117.5 kg)   06/05/19 257 lb (116.6 kg)   04/19/19 257 lb (116.6 kg)            Review of Systems  Constitutional: negative for fevers, chills, anorexia and weight loss  Eyes:   negative for visual disturbance and irritation  ENT:   negative for tinnitus,sore throat,nasal congestion,ear pains. hoarseness  Respiratory:  negative for cough, hemoptysis, dyspnea,wheezing  CV:   negative for chest pain, palpitations, lower extremity edema  GI:   negative for nausea, vomiting, diarrhea, abdominal pain,melena  Musculoskel: negative for myalgias, arthralgias, back pain, muscle weakness, joint pain  Neurological:  negative for headaches, dizziness, vertigo, memory problems and gait Behavl/Psych: negative for feelings of anxiety, depression, mood changes    Past Medical History:   Diagnosis Date    Chronic kidney disease     kidney failure-left    Depression     GERD (gastroesophageal reflux disease)     Grave's disease     Hypertension     Hypothyroidism following radioiodine therapy     2009    Liver disease     Morbid obesity ([address]     Nausea & vomiting     Obstructive sleep apnea (adult) (pediatric) 2013    Thyroid disease      Past Surgical History:   Procedure Laterality Date    HX CHOLECYSTECTOMY      HX GYN      novasure    HX ORTHOPAEDIC      Left arm    HX UROLOGICAL      bladder sling     Social History     Socioeconomic History    Marital status:      Spouse name: Not on file    Number of children: Not on file    Years of education: Not on file    Highest education level: Not on file   Tobacco Use    Smoking status: Current Some Day Smoker     Packs/day: 0.50     Last attempt to quit: 1/3/2014     Years since quittin.4    Smokeless tobacco: Never Used   Substance and Sexual Activity    Alcohol use: No     Comment: rare, 1 drink a month    Drug use: No    Sexual activity: Yes     Partners: Male     Family History   Problem Relation Age of Onset    Hypertension Mother     Hypertension Father     Other Father     Heart Attack Father     Diabetes Sister     Hypertension Brother      Current Outpatient Medications   Medication Sig Dispense Refill    oxyCODONE-acetaminophen (PERCOCET)  mg per tablet Take 1 Tab by mouth nightly as needed for Pain for up to 7 days. Max Daily Amount: 1 Tab. 20 Tab 0    cyclobenzaprine (FLEXERIL) 10 mg tablet Take 1 Tab by mouth three (3) times daily as needed for Muscle Spasm(s). 21 Tab 0    levothyroxine (SYNTHROID) 50 mcg tablet Take 1 Tab by mouth Daily (before breakfast). 30 Tab 3    indapamide (LOZOL) 2.5 mg tablet Take 2 Tabs by mouth daily.  60 Tab 2    busPIRone (BUSPAR) 7.5 mg tablet Take 1 Tab by mouth three (3) times daily. 90 Tab 1    levothyroxine (SYNTHROID) 300 mcg tablet Take 1 Tab by mouth Daily (before breakfast). 60 Tab 2    ibuprofen (MOTRIN) 800 mg tablet Take 1 Tab by mouth every eight (8) hours as needed for Pain (pain). TAKE WITH FOOD 60 Tab 1     Allergies   Allergen Reactions    Ace Inhibitors Cough    Other Medication Unable to [employer] names, 1 for sinus,1 for bp and 1 for bladder pt states it is called \"sentura\"    Penicillins Rash    Sanctura [Trospium] Other (comments)    Tudorza Pressair [Aclidinium Bromide] Swelling    Vesicare [Solifenacin] Rash and Other (comments)       Objective:  Visit Vitals  /84 (BP 1 Location: Right arm, BP Patient Position: Sitting)   Pulse (!) 59   Temp 97.4 °F (36.3 °C) (Oral)   Resp 19   Ht 5' 3.5\" (1.613 m)   Wt 259 lb (117.5 kg)   SpO2 97%   BMI 45.16 kg/m²     Physical Exam:   General appearance - alert, obese, crying. In significant distress  Mental status - alert, oriented to person, place, and time  EYE-MAX, EOMI, corneas normal, no foreign bodies  ENT-ENT exam normal, no neck nodes or sinus tenderness  Mouth - mucous membranes moist, pharynx normal without lesions  Neck - supple, no significant adenopathy   Chest - clear to auscultation, no wheezes, rales or rhonchi, symmetric air entry   Heart - normal rate, regular rhythm, normal S1, S2, 2/6 systolic murmur  Abdomen - soft, nontender, obese +bs  Ext-unable to perform straight leg due to patients discomfort  Skin-Warm and dry.  no hyperpigmentation, vitiligo, or suspicious lesions  Neuro -alert, oriented, normal speech, no focal findings or movement disorder noted      Results for orders placed or performed in visit on 04/19/19   TSH REFLEX TO T4   Result Value Ref Range    TSH 25.210 (H) 0.450 - 4.500 uIU/mL   VITAMIN D, 25 HYDROXY   Result Value Ref Range    VITAMIN D, 25-HYDROXY 21.5 (L) 30.0 - 100.0 ng/mL   THYROXINE (T4)   Result Value Ref Range    T4, Total 4.0 (L) 4.5 - 12.0 ug/dL       Assessment/Plan:    ICD-10-CM ICD-9-CM    1. Lumbar radiculopathy M54.16 724.4 oxyCODONE-acetaminophen (PERCOCET)  mg per tablet      cyclobenzaprine (FLEXERIL) 10 mg tablet      XR SPINE LUMB 2 OR 3 V   2. Morbid obesity ([facility] [address] E66.01 278.01    3. Hypertension, renal disease I12.9 403.90      585.9      Orders Placed This Encounter    XR SPINE LUMB 2 OR 3 V     Standing Status:   Future     Number of Occurrences:   1     Standing Expiration Date:   7/25/2019     Order Specific Question:   Reason for Exam     Answer:   radicular pain     Order Specific Question:   Is Patient Pregnant? Answer:   No    oxyCODONE-acetaminophen (PERCOCET)  mg per tablet     Sig: Take 1 Tab by mouth nightly as needed for Pain for up to 7 days. Max Daily Amount: 1 Tab. Dispense:  20 Tab     Refill:  0    cyclobenzaprine (FLEXERIL) 10 mg tablet     Sig: Take 1 Tab by mouth three (3) times daily as needed for Muscle Spasm(s). Dispense:  21 Tab     Refill:  0       1. Lumbar radiculopathy  Recommend moist heat  Go to ED if acutely worsen  - oxyCODONE-acetaminophen (PERCOCET)  mg per tablet; Take 1 Tab by mouth nightly as needed for Pain for up to 7 days. Max Daily Amount: 1 Tab. Dispense: 20 Tab; Refill: 0  - cyclobenzaprine (FLEXERIL) 10 mg tablet; Take 1 Tab by mouth three (3) times daily as needed for Muscle Spasm(s). Dispense: 21 Tab; Refill: 0  - XR SPINE LUMB 2 OR 3 V; Future    2. Morbid obesity ([facility] [address]  Noted    3. Hypertension, renal disease  Decompensated ? Due to pain              Patient Instructions          Sciatica: Care Instructions  Your Care Instructions    Sciatica (say \"zuw-LD-dt-kuh\") is an irritation of one of the sciatic nerves, which come from the spinal cord in the lower back. The sciatic nerves and their branches extend down through the buttock to the foot. Sciatica can develop when an injured disc in the back presses against a spinal nerve root. Its main symptom is pain, numbness, or weakness that is often worse in the leg or foot than in the back. Sciatica often will improve and go away with time. Early treatment usually includes medicines and exercises to relieve pain. Follow-up care is a key part of your treatment and safety. Be sure to make and go to all appointments, and call your doctor if you are having problems. It's also a good idea to know your test results and keep a list of the medicines you take. How can you care for yourself at home? · Take pain medicines exactly as directed. ? If the doctor gave you a prescription medicine for pain, take it as prescribed. ? If you are not taking a prescription pain medicine, ask your doctor if you can take an over-the-counter medicine. · Use heat or ice to relieve pain. ? To apply heat, put a warm water bottle, heating pad set on low, or warm cloth on your back. Do not go to sleep with a heating pad on your skin. ? To use ice, put ice or a cold pack on the area for 10 to 20 minutes at a time. Put a thin cloth between the ice and your skin. · Avoid sitting if possible, unless it feels better than standing. · Alternate lying down with short walks. Increase your walking distance as you are able to without making your symptoms worse. · Do not do anything that makes your symptoms worse. When should you call for help? Call 911 anytime you think you may need emergency care. For example, call if:    · You are unable to move a leg at all.   [facility] your doctor now or seek immediate medical care if:    · You have new or worse symptoms in your legs or buttocks. Symptoms may include:  ? Numbness or tingling. ? Weakness. ? Pain.     · You lose bladder or bowel control.    Watch closely for changes in your health, and be sure to contact your doctor if:    · You are not getting better as expected. Where can you learn more? Go to http://sara-annabelle.info/.   Enter [phone] in the search box to learn more about \"Sciatica: Care Instructions. \"  Current as of: September 20, 2018  Content Version: 11.9  © 1552-7782 [employer]. Care instructions adapted under license by [employer] (which disclaims liability or warranty for this information). If you have questions about a medical condition or this instruction, always ask your healthcare professional. [place] [address] any warranty or liability for your use of this information. Follow-up and Dispositions    · Return if symptoms worsen or fail to improve, for f/u routine/prn. I have reviewed with the patient details of the assessment and plan and all questions were answered. Relevent patient education was performed. The most recent lab findings were reviewed with the patient. An After Visit Summary was printed and given to the patient.

## 2019-06-25 NOTE — PATIENT INSTRUCTIONS
Sciatica: Care Instructions  Your Care Instructions    Sciatica (say \"vyz-EA-ta-kuh\") is an irritation of one of the sciatic nerves, which come from the spinal cord in the lower back. The sciatic nerves and their branches extend down through the buttock to the foot. Sciatica can develop when an injured disc in the back presses against a spinal nerve root. Its main symptom is pain, numbness, or weakness that is often worse in the leg or foot than in the back. Sciatica often will improve and go away with time. Early treatment usually includes medicines and exercises to relieve pain. Follow-up care is a key part of your treatment and safety. Be sure to make and go to all appointments, and call your doctor if you are having problems. It's also a good idea to know your test results and keep a list of the medicines you take. How can you care for yourself at home? · Take pain medicines exactly as directed. ? If the doctor gave you a prescription medicine for pain, take it as prescribed. ? If you are not taking a prescription pain medicine, ask your doctor if you can take an over-the-counter medicine. · Use heat or ice to relieve pain. ? To apply heat, put a warm water bottle, heating pad set on low, or warm cloth on your back. Do not go to sleep with a heating pad on your skin. ? To use ice, put ice or a cold pack on the area for 10 to 20 minutes at a time. Put a thin cloth between the ice and your skin. · Avoid sitting if possible, unless it feels better than standing. · Alternate lying down with short walks. Increase your walking distance as you are able to without making your symptoms worse. · Do not do anything that makes your symptoms worse. When should you call for help? Call 911 anytime you think you may need emergency care.  For example, call if:    · You are unable to move a leg at all.   [facility] your doctor now or seek immediate medical care if:    · You have new or worse symptoms in your legs or buttocks. Symptoms may include:  ? Numbness or tingling. ? Weakness. ? Pain.     · You lose bladder or bowel control.    Watch closely for changes in your health, and be sure to contact your doctor if:    · You are not getting better as expected. Where can you learn more? Go to http://sara-annabelle.info/. Enter [phone] in the search box to learn more about \"Sciatica: Care Instructions. \"  Current as of: September 20, 2018  Content Version: 11.9  © 2307-3005 [employer]. Care instructions adapted under license by [employer] (which disclaims liability or warranty for this information). If you have questions about a medical condition or this instruction, always ask your healthcare professional. [address] any warranty or liability for your use of this information.

## 2019-06-25 NOTE — PROGRESS NOTES
Chief Complaint   Patient presents with    Back Pain     and right lower extremties x 1 week     1. Have you been to the ER, urgent care clinic since your last visit? Hospitalized since your last visit? No    2. Have you seen or consulted any other health care providers outside of the [address] [place] [address] since your last visit? Include any pap smears or colon screening.  No

## 2019-07-12 ENCOUNTER — ANESTHESIA EVENT (OUTPATIENT)
Dept: ENDOSCOPY | Age: 49
End: 2019-07-12
Payer: MEDICAID

## 2019-07-12 ENCOUNTER — HOSPITAL ENCOUNTER (OUTPATIENT)
Age: 49
Setting detail: OUTPATIENT SURGERY
Discharge: HOME | End: 2019-07-12
Attending: SPECIALIST | Admitting: SPECIALIST
Payer: MEDICAID

## 2019-07-12 ENCOUNTER — ANESTHESIA (OUTPATIENT)
Dept: ENDOSCOPY | Age: 49
End: 2019-07-12
Payer: MEDICAID

## 2019-07-12 VITALS
BODY MASS INDEX: 44.22 KG/M2 | HEART RATE: 55 BPM | OXYGEN SATURATION: 95 % | DIASTOLIC BLOOD PRESSURE: 99 MMHG | WEIGHT: 259 LBS | TEMPERATURE: 97.3 F | SYSTOLIC BLOOD PRESSURE: 167 MMHG | RESPIRATION RATE: 15 BRPM | HEIGHT: 64 IN

## 2019-07-12 PROCEDURE — 77030013992 HC SNR POLYP ENDOSC BSC -B: Performed by: SPECIALIST

## 2019-07-12 PROCEDURE — 76060000031 HC ANESTHESIA FIRST 0.5 HR: Performed by: SPECIALIST

## 2019-07-12 PROCEDURE — 74011250636 HC RX REV CODE- 250/636: Performed by: SPECIALIST

## 2019-07-12 PROCEDURE — 76040000019: Performed by: SPECIALIST

## 2019-07-12 PROCEDURE — 77030039825 HC MSK NSL PAP SUPERNO2VA VYRM -B: Performed by: NURSE ANESTHETIST, CERTIFIED REGISTERED

## 2019-07-12 PROCEDURE — 88305 TISSUE EXAM BY PATHOLOGIST: CPT

## 2019-07-12 PROCEDURE — 74011250636 HC RX REV CODE- 250/636

## 2019-07-12 RX ORDER — LIDOCAINE HYDROCHLORIDE 20 MG/ML
INJECTION, SOLUTION EPIDURAL; INFILTRATION; INTRACAUDAL; PERINEURAL AS NEEDED
Status: DISCONTINUED | OUTPATIENT
Start: 2019-07-12 | End: 2019-07-12 | Stop reason: HOSPADM

## 2019-07-12 RX ORDER — SODIUM CHLORIDE 9 MG/ML
50 INJECTION, SOLUTION INTRAVENOUS CONTINUOUS
Status: DISCONTINUED | OUTPATIENT
Start: 2019-07-12 | End: 2019-07-12 | Stop reason: HOSPADM

## 2019-07-12 RX ORDER — SODIUM CHLORIDE 0.9 % (FLUSH) 0.9 %
5-40 SYRINGE (ML) INJECTION EVERY 8 HOURS
Status: DISCONTINUED | OUTPATIENT
Start: 2019-07-12 | End: 2019-07-12 | Stop reason: HOSPADM

## 2019-07-12 RX ORDER — SODIUM CHLORIDE 0.9 % (FLUSH) 0.9 %
5-40 SYRINGE (ML) INJECTION AS NEEDED
Status: DISCONTINUED | OUTPATIENT
Start: 2019-07-12 | End: 2019-07-12 | Stop reason: HOSPADM

## 2019-07-12 RX ORDER — DEXTROMETHORPHAN/PSEUDOEPHED 2.5-7.5/.8
1.2 DROPS ORAL
Status: DISCONTINUED | OUTPATIENT
Start: 2019-07-12 | End: 2019-07-12 | Stop reason: HOSPADM

## 2019-07-12 RX ORDER — FENTANYL CITRATE 50 UG/ML
25 INJECTION, SOLUTION INTRAMUSCULAR; INTRAVENOUS
Status: DISCONTINUED | OUTPATIENT
Start: 2019-07-12 | End: 2019-07-12 | Stop reason: HOSPADM

## 2019-07-12 RX ORDER — PROPOFOL 10 MG/ML
INJECTION, EMULSION INTRAVENOUS AS NEEDED
Status: DISCONTINUED | OUTPATIENT
Start: 2019-07-12 | End: 2019-07-12 | Stop reason: HOSPADM

## 2019-07-12 RX ORDER — MIDAZOLAM HYDROCHLORIDE 1 MG/ML
1-2 INJECTION, SOLUTION INTRAMUSCULAR; INTRAVENOUS
Status: DISCONTINUED | OUTPATIENT
Start: 2019-07-12 | End: 2019-07-12 | Stop reason: HOSPADM

## 2019-07-12 RX ADMIN — SODIUM CHLORIDE 50 ML/HR: 900 INJECTION, SOLUTION INTRAVENOUS at 10:12

## 2019-07-12 RX ADMIN — PROPOFOL 90 MG: 10 INJECTION, EMULSION INTRAVENOUS at 10:46

## 2019-07-12 RX ADMIN — LIDOCAINE HYDROCHLORIDE 40 MG: 20 INJECTION, SOLUTION EPIDURAL; INFILTRATION; INTRACAUDAL; PERINEURAL at 10:38

## 2019-07-12 RX ADMIN — PROPOFOL 100 MG: 10 INJECTION, EMULSION INTRAVENOUS at 10:41

## 2019-07-12 RX ADMIN — PROPOFOL 100 MG: 10 INJECTION, EMULSION INTRAVENOUS at 10:47

## 2019-07-12 NOTE — ROUTINE PROCESS
Lo Billing  1970  [id number]    Situation:  Verbal report received from: Raeann Best RN  Procedure: Procedure(s):  COLONOSCOPY  ENDOSCOPIC POLYPECTOMY    Background:    Preoperative diagnosis: SCREENING  Postoperative diagnosis: colon polyps    :  Dr. Baylee Seymour  Assistant(s): Endoscopy Technician-1: Christa De La Rosa  Endoscopy RN-1: Silvia Keen    Specimens:   ID Type Source Tests Collected by Time Destination   1 : sigmoid colon polyps Preservative Sigmoid  Viviana Velazquez MD 7/12/2019 1042 Pathology     H. Pylori  no    Assessment:  Intra-procedure medications     Anesthesia gave intra-procedure sedation and medications, see anesthesia flow sheet yes    Intravenous fluids: NS@ KVO     Vital signs stable       Abdominal assessment: round and soft       Recommendation:  Discharge patient per MD order.     Family or Friend Sergo Euceda  Permission to share finding with family or friend yes

## 2019-07-12 NOTE — DISCHARGE INSTRUCTIONS
Micaela Thomas  [id number]  1970    COLON DISCHARGE INSTRUCTIONS  Discomfort:  Redness at IV site- apply warm compress to area; if redness or soreness persist- contact your physician  There may be a slight amount of blood passed from the rectum  Gaseous discomfort- walking, belching will help relieve any discomfort  You may not operate a vehicle for 12 hours  You may not engage in an occupation involving machinery or appliances for rest of today  You may not drink alcoholic beverages for at least 12 hours  Avoid making any critical decisions for at least 24 hour  DIET:   Regular diet. - however -  remember your colon is empty and a heavy meal will produce gas. Avoid these foods:  vegetables, fried / greasy foods, carbonated drinks for today. MEDICATIONS:        Regarding Aspirin or Nonsteroidal medications, please see below. ACTIVITY:  You may resume your normal daily activities it is recommended that you spend the remainder of the day resting -  avoid any strenuous activity. CALL M.D. ANY SIGN OF:  Increasing pain, nausea, vomiting  Abdominal distension (swelling)  New increased bleeding (oral or rectal)  Fever (chills)    ONLY  Tylenol as needed for pain.       Follow-up Instructions:   Call Dr. Estefani Martinez for results of procedure / biopsy in 4-5 days at telephone #  [phone]

## 2019-07-12 NOTE — PROCEDURES
Colonoscopy Procedure Note    Indications:   Screening colonoscopy    Referring Physician: Emil Pastor MD  Anesthesia/Sedation: MAC anesthesia Propofol  Endoscopist:  Dr. Rachel Peña    Procedure in Detail:  Informed consent was obtained for the procedure, including sedation. Risks of perforation, hemorrhage, adverse drug reaction, and aspiration were discussed. The patient was placed in the left lateral decubitus position. Based on the pre-procedure assessment, including review of the patient's medical history, medications, allergies, and review of systems, she had been deemed to be an appropriate candidate for moderate sedation; she was therefore sedated with the medications listed above. The patient was monitored continuously with ECG tracing, pulse oximetry, blood pressure monitoring, and direct observations. A rectal examination was performed. The [id number] was inserted into the rectum and advanced under direct vision to the cecum, which was identified by the ileocecal valve and appendiceal orifice. The quality of the colonic preparation was adequate. A careful inspection was made as the colonoscope was withdrawn, including a retroflexed view of the rectum; findings and interventions are described below. Appropriate photodocumentation was obtained. Findings:     1. Scope advanced to the cecum. 2.  (2) small polyps 4 mm in sigmoid s/p cold snare removal.  3.  Normal mucosa visualized throughout. Therapies:  See above    Specimen: Specimens were collected as described above and sent to pathology. Complications: None were encountered during the procedure. EBL: < 10 ml.     Recommendations:   -f/u path to determine next colonoscopy interval  Signed By: Amanda Grajeda MD                        July 12, 2019

## 2019-07-12 NOTE — PERIOP NOTES
Polyp removed from Sigmoid:   - Protruding lesions:     -Sessile Polyp(s) size 2-3 mm removed by polypectomy (cold snare) using cold snare technique x 2 polyps.

## 2019-07-12 NOTE — H&P
Gastroenterology Outpatient History and Physical    Patient: Eileen Ryan    Physician: Timi العلي MD    Vital Signs: Blood pressure (!) 154/98, pulse (!) 59, temperature 97.9 °F (36.6 °C), resp. rate 11, height 5' 3.5\" (1.613 m), weight 117.5 kg (259 lb), SpO2 100 %, not currently breastfeeding. Allergies: Allergies   Allergen Reactions    Ace Inhibitors Cough    Other Medication Unable to [employer] names, 1 for sinus,1 for bp and 1 for bladder pt states it is called \"sentura\"    Penicillins Rash    Sanctura [Trospium] Other (comments)    Tudorza Pressair [Aclidinium Bromide] Swelling    Vesicare [Solifenacin] Rash and Other (comments)       Chief Complaint: Screening colonoscopy    History of Present Illness: 50 yo F for screening colonoscopy.     Justification for Procedure: above    History:  Past Medical History:   Diagnosis Date    Chronic kidney disease     kidney failure-left    Depression     GERD (gastroesophageal reflux disease)     Grave's disease     Hypertension     Hypothyroidism following radioiodine therapy         Liver disease     Morbid obesity ([address]     Nausea & vomiting     Obstructive sleep apnea (adult) (pediatric) 2013    Thyroid disease       Past Surgical History:   Procedure Laterality Date    HX CHOLECYSTECTOMY      HX GYN      novasure    HX ORTHOPAEDIC      Left arm    HX UROLOGICAL      bladder sling      Social History     Socioeconomic History    Marital status:      Spouse name: Not on file    Number of children: Not on file    Years of education: Not on file    Highest education level: Not on file   Tobacco Use    Smoking status: Current Some Day Smoker     Packs/day: 0.50     Last attempt to quit: 1/3/2014     Years since quittin.5    Smokeless tobacco: Never Used   Substance and Sexual Activity    Alcohol use: No     Comment: rare, 1 drink a month    Drug use: No    Sexual activity: Yes Partners: Male      Family History   Problem Relation Age of Onset    Hypertension Mother     Hypertension Father     Other Father     Heart Attack Father     Diabetes Sister     Hypertension Brother        Medications:   Prior to Admission medications    Medication Sig Start Date End Date Taking? Authorizing Provider   levothyroxine (SYNTHROID) 50 mcg tablet Take 1 Tab by mouth Daily (before breakfast). 6/5/19  Yes Dominick Moses MD   indapamide (LOZOL) 2.5 mg tablet Take 2 Tabs by mouth daily. 4/19/19  Yes Dominick Moses MD   busPIRone (BUSPAR) 7.5 mg tablet Take 1 Tab by mouth three (3) times daily. 4/19/19  Yes Dominick Moses MD   levothyroxine (SYNTHROID) 300 mcg tablet Take 1 Tab by mouth Daily (before breakfast). 3/8/19  Yes Dominick Moses MD   cyclobenzaprine (FLEXERIL) 10 mg tablet Take 1 Tab by mouth three (3) times daily as needed for Muscle Spasm(s). 6/25/19   Dominick Moses MD   ibuprofen (MOTRIN) 800 mg tablet Take 1 Tab by mouth every eight (8) hours as needed for Pain (pain). TAKE WITH FOOD 3/8/19   Dominick Moses MD       Physical Exam:   General: alert, no distress   HEENT: Head: Normocephalic, no lesions, without obvious abnormality.    Heart: regular rate and rhythm, S1, S2 normal, no murmur, click, rub or gallop   Lungs: chest clear, no wheezing, rales, normal symmetric air entry   Abdominal: soft, NT/ND +BS   Neurological: Grossly normal   Extremities: extremities normal, atraumatic, no cyanosis or edema     Findings/Diagnosis: Screening colonoscopy    Plan of Care/Planned Procedure: Colonoscopy

## 2019-07-12 NOTE — ANESTHESIA POSTPROCEDURE EVALUATION
Procedure(s):  COLONOSCOPY  ENDOSCOPIC POLYPECTOMY. total IV anesthesia, MAC    Anesthesia Post Evaluation        Patient location during evaluation: PACU  Note status: Adequate. Level of consciousness: responsive to verbal stimuli and sleepy but conscious  Pain management: satisfactory to patient  Airway patency: patent  Anesthetic complications: no  Cardiovascular status: acceptable  Respiratory status: acceptable  Hydration status: acceptable  Comments: +Post-Anesthesia Evaluation and Assessment    Patient: Myra Mckinney MRN: [id number]  SSN: [id number]   YOB: 1970  Age: 51 y.o. Sex: female      Cardiovascular Function/Vital Signs    BP (!) 162/101   Pulse (!) 56   Temp 36.3 °C (97.3 °F)   Resp 12   Ht 5' 3.5\" (1.613 m)   Wt 117.5 kg (259 lb)   SpO2 99%   Breastfeeding? No   BMI 45.16 kg/m²     Patient is status post Procedure(s):  COLONOSCOPY  ENDOSCOPIC POLYPECTOMY. Nausea/Vomiting: Controlled. Postoperative hydration reviewed and adequate. Pain:  Pain Scale 1: Numeric (0 - 10) (07/12/19 1105)  Pain Intensity 1: 0 (07/12/19 1105)   Managed. Neurological Status: At baseline. Mental Status and Level of Consciousness: Arousable. Pulmonary Status:   O2 Device: Room air (07/12/19 1123)   Adequate oxygenation and airway patent. Complications related to anesthesia: None    Post-anesthesia assessment completed. No concerns. Signed By: Berkley Figueroa MD    7/12/2019  Post anesthesia nausea and vomiting:  controlled      Vitals Value Taken Time   /99 7/12/2019 11:28 AM   Temp 36.3 °C (97.3 °F) 7/12/2019 11:03 AM   Pulse 55 7/12/2019 11:28 AM   Resp 15 7/12/2019 11:28 AM   SpO2 95 % 7/12/2019 11:28 AM   Vitals shown include unvalidated device data.

## 2019-07-12 NOTE — ANESTHESIA PREPROCEDURE EVALUATION
Relevant Problems   No relevant active problems       Anesthetic History     PONV          Review of Systems / Medical History  Patient summary reviewed, nursing notes reviewed and pertinent labs reviewed    Pulmonary        Sleep apnea: No treatment           Neuro/Psych         Psychiatric history     Cardiovascular    Hypertension              Exercise tolerance: >4 METS     GI/Hepatic/Renal     GERD    Renal disease: CRI  Liver disease     Endo/Other      Hypothyroidism  Morbid obesity, arthritis and anemia     Other Findings            Physical Exam    Airway  Mallampati: II  TM Distance: 4 - 6 cm  Neck ROM: normal range of motion   Mouth opening: Normal     Cardiovascular  Regular rate and rhythm,  S1 and S2 normal,  no murmur, click, rub, or gallop             Dental  No notable dental hx       Pulmonary  Breath sounds clear to auscultation               Abdominal  GI exam deferred       Other Findings            Anesthetic Plan    ASA: 3  Anesthesia type: total IV anesthesia and MAC          Induction: Intravenous  Anesthetic plan and risks discussed with: Patient

## 2019-07-16 ENCOUNTER — OFFICE VISIT (OUTPATIENT)
Dept: SLEEP MEDICINE | Age: 49
End: 2019-07-16

## 2019-07-16 VITALS
HEIGHT: 64 IN | SYSTOLIC BLOOD PRESSURE: 125 MMHG | DIASTOLIC BLOOD PRESSURE: 83 MMHG | HEART RATE: 74 BPM | BODY MASS INDEX: 43.79 KG/M2 | OXYGEN SATURATION: 98 % | WEIGHT: 256.5 LBS

## 2019-07-16 DIAGNOSIS — E66.01 MORBID OBESITY (HCC): ICD-10-CM

## 2019-07-16 DIAGNOSIS — G47.33 OBSTRUCTIVE SLEEP APNEA (ADULT) (PEDIATRIC): Primary | ICD-10-CM

## 2019-07-16 NOTE — PROGRESS NOTES
[address] [facility] [place]. [place], [address]  Tel.  [phone]  Fax. 100 [facility] 60  1001 [facility], [address]  Tel.  [phone]  Fax. [phone] [facility] [place] Villav[address]  Tel.  [phone]  Fax. [phone]         Subjective:      Lucy Marie is an 51 y.o. female self-referred for evaluation for a sleep disorder. She complains of snoring, snorting, choking, periods of not breathing associated with excessive daytime sleepiness. Symptoms began several years ago, gradually worsening since that time. She usually can fall asleep in variable minutes. Family or house members note snoring. She denies falling asleep while driving. Lucy Marie does wake up frequently at night. She is bothered by waking up too early and left unable to get back to sleep. She actually sleeps about 5 hours at night and wakes up about 5 times during the night. She   work shifts:  .   Lacey Kate indicates she does get too little sleep at night. Her bedtime is 0000. She awakens at 0800. She does take naps. She takes 5 naps a week lasting 1. She has the following observed behaviors: Loud snoring, Pauses in breathing; Other (use comments). Other remarks: vivid dreams, waking with a gasp or snort  She was diagnosed here in 2012 with LUCINA with AHI 14/hour. She had a CPAP but it is old and when she tried to use it, she got a very parched throat. She is interested in treating her apnea but needs a new device.    F[place]na Sleepiness Score: 3      Allergies   Allergen Reactions    Ace Inhibitors Cough    Other Medication Unable to [employer] names, 1 for sinus,1 for bp and 1 for bladder pt states it is called \"sentura\"    Penicillins Rash    Sanctura [Trospium] Other (comments)    Tudorza Pressair [Aclidinium Bromide] Swelling    Vesicare [Solifenacin] Rash and Other (comments)         Current Outpatient Medications:     levothyroxine (SYNTHROID) 50 mcg tablet, Take 1 Tab by mouth Daily (before breakfast). , Disp: 30 Tab, Rfl: 3    indapamide (LOZOL) 2.5 mg tablet, Take 2 Tabs by mouth daily. , Disp: 60 Tab, Rfl: 2    busPIRone (BUSPAR) 7.5 mg tablet, Take 1 Tab by mouth three (3) times daily. , Disp: 90 Tab, Rfl: 1    levothyroxine (SYNTHROID) 300 mcg tablet, Take 1 Tab by mouth Daily (before breakfast). , Disp: 60 Tab, Rfl: 2    ibuprofen (MOTRIN) 800 mg tablet, Take 1 Tab by mouth every eight (8) hours as needed for Pain (pain). TAKE WITH FOOD, Disp: 60 Tab, Rfl: 1    cyclobenzaprine (FLEXERIL) 10 mg tablet, Take 1 Tab by mouth three (3) times daily as needed for Muscle Spasm(s). , Disp: 21 Tab, Rfl: 0     She  has a past medical history of Chronic kidney disease, Depression, GERD (gastroesophageal reflux disease), Grave's disease, Hypertension, Hypothyroidism following radioiodine therapy, Liver disease, Morbid obesity ([address] Nausea & vomiting, Obstructive sleep apnea (adult) (pediatric) (1/8/2013), and Thyroid disease. She  has a past surgical history that includes hx cholecystectomy; hx gyn; hx urological; hx orthopaedic; and colonoscopy (N/A, 7/12/2019). She family history includes Diabetes in her sister; Heart Attack in her father; Hypertension in her brother, father, and mother; Other in her father. She  reports that she has been smoking. She has been smoking about 0.50 packs per day. She has never used smokeless tobacco. She reports that she does not drink alcohol or use drugs. Review of Systems:  Constitutional:  Mild weight loss  Eyes:  No blurred vision.   CVS:  No significant chest pain  Pulm:  No significant shortness of breath  GI:  No significant nausea or vomiting, +heartburn  :  No significant nocturia  Musculoskeletal+ significant joint pain at night  Skin:  No significant rashes  Neuro:  No significant dizziness   Psych:  Treated for anxiety    Sleep Review of Systems: notable for some difficulty falling asleep; infrequent awakenings at night;  regular dreaming noted; no nightmares ; + early morning headaches; no memory problems; + concentration issues; no history of any automobile or occupational accidents due to daytime drowsiness. Objective:     Visit Vitals  /83 (BP 1 Location: Left arm)   Pulse 74   Ht 5' 3.5\" (1.613 m)   Wt 256 lb 8 oz (116.3 kg)   SpO2 98%   BMI 44.72 kg/m²         General:   Not in acute distress   Eyes:  Anicteric sclerae, no obvious strabismus   Nose:  No obvious nasal septum deviation    Oropharynx:   Class 3 oropharyngeal outlet, thick tongue base, enlarged and boggy uvula, low-lying soft palate, narrow tonsilo-pharyngeal pilars   Tonsils:   tonsils are present and normal   Neck:   Neck circ. in \"inches\": 14.5; midline trachea   Chest/Lungs:  Equal lung expansion, clear on auscultation    CVS:  Normal rate, regular rhythm; no JVD   Skin:  Warm to touch; no obvious rashes   Neuro:  No focal deficits ; no obvious tremor    Psych:  Normal affect,  normal countenance;          Assessment:       ICD-10-CM ICD-9-CM    1. Obstructive sleep apnea (adult) (pediatric) G47.33 327.23 SLEEP STUDY UNATTENDED, 4 CHANNEL   2. Morbid obesity ([facility] [address] E66.01 278.01          Plan:     * The patient currently has a High Risk for having sleep apnea. STOP-BANG score 6.  * PSG was ordered for initial evaluation. I have reviewed the different types of sleep studies. Attended sleep studies and home sleep apnea tests. Home sleep testing tests only for the presence and severity of sleep apnea. she understands that if the HSAT does not provide reliable result(such as poor data/failed HSAT recording), she may have to repeat the HSAT or come in for an attended polysomnogram.     * She was provided information on sleep apnea including coresponding risk factors and the importance of proper treatment. * Counseling was provided regarding proper sleep hygiene and safe driving. Treatment options for sleep apnea were reviewed. she is not against a trial of PAP if found to have significant sleep apnea. She is interested in a new machine. The treatment plan was reviewed with the patient in detail and reviewed with the patient and the lead technologist. she understands that the lead technologist will be calling her  with the results and assisting with the next step in the treatment plan as outlined today during the consultation with me. All of her questions were addressed. I have counseled the patient regarding the benefits of smoking cessation. 2. Obesity - I have counseled the patient regarding the benefits of weight loss. 3. Hypertension - she continues on her current regimen. I have reviewed the relationship between hypertension as it relates to sleep-disordered breathing.      Electronically signed by    Nevaeh Jordan MD  Diplomate in Sleep Medicine  [facility]

## 2019-07-16 NOTE — PATIENT INSTRUCTIONS
[address] [place]. [place], [address]  Tel.  [phone]  Fax. 100 [facility] 60  [place], [address] [facility]  Tel.  [phone]  Fax. [phone] [address]  Tel.  [phone]  Fax. [phone]     Sleep Apnea: After Your Visit  Your Care Instructions  Sleep apnea occurs when you frequently stop breathing for 10 seconds or longer during sleep. It can be mild to severe, based on the number of times per hour that you stop breathing or have slowed breathing. Blocked or narrowed airways in your nose, mouth, or throat can cause sleep apnea. Your airway can become blocked when your throat muscles and tongue relax during sleep. Sleep apnea is common, occurring in 1 out of 20 individuals. Individuals having any of the following characteristics should be evaluated and treated right away due to high risk and detrimental consequences from untreated sleep apnea:  1. Obesity  2. Congestive Heart failure  3. Atrial Fibrillation  4. Uncontrolled Hypertension  5. Type II Diabetes  6. Night-time Arrhythmias  7. Stroke  8. Pulmonary Hypertension  9. High-risk Driving Populations (pilots, truck drivers, etc.)  10. Patients Considering Weight-loss Surgery    How do you know you have sleep apnea? You probably have sleep apnea if you answer 'yes' to 3 or more of the following questions:  S - Have you been told that you Snore? T - Are you often Tired during the day? O - Has anyone Observed you stop breathing while sleeping? P- Do you have (or are being treated for) high blood Pressure? B - Are you obese (Body Mass Index > 35)? A - Is your Age 48years old or older? N - Is your Neck size greater than 16 inches? G - Are you male Gender? A sleep physician can prescribe a breathing device that prevents tissues in the throat from blocking your airway.  Or your doctor may recommend using a dental device (oral breathing device) to help keep your airway open. In some cases, surgery may be needed to remove enlarged tissues in the throat. Follow-up care is a key part of your treatment and safety. Be sure to make and go to all appointments, and call your doctor if you are having problems. It's also a good idea to know your test results and keep a list of the medicines you take. How can you care for yourself at home? · Lose weight, if needed. It may reduce the number of times you stop breathing or have slowed breathing. · Go to bed at the same time every night. · Sleep on your side. It may stop mild apnea. If you tend to roll onto your back, sew a pocket in the back of your pajama top. Put a tennis ball into the pocket, and stitch the pocket shut. This will help keep you from sleeping on your back. · Avoid alcohol and medicines such as sleeping pills and sedatives before bed. · Do not smoke. Smoking can make sleep apnea worse. If you need help quitting, talk to your doctor about stop-smoking programs and medicines. These can increase your chances of quitting for good. · Prop up the head of your bed 4 to 6 inches by putting bricks under the legs of the bed. · Treat breathing problems, such as a stuffy nose, caused by a cold or allergies. · Use a continuous positive airway pressure (CPAP) breathing machine if lifestyle changes do not help your apnea and your doctor recommends it. The machine keeps your airway from closing when you sleep. · If CPAP does not help you, ask your doctor whether you should try other breathing machines. A bilevel positive airway pressure machine has two types of air pressureâone for breathing in and one for breathing out. Another device raises or lowers air pressure as needed while you breathe. · If your nose feels dry or bleeds when using one of these machines, talk with your doctor about increasing moisture in the air. A humidifier may help.   · If your nose is runny or stuffy from using a breathing machine, talk with your doctor about using decongestants or a corticosteroid nasal spray. When should you call for help? Watch closely for changes in your health, and be sure to contact your doctor if:  · You still have sleep apnea even though you have made lifestyle changes. · You are thinking of trying a device such as CPAP. · You are having problems using a CPAP or similar machine. Where can you learn more? Go to xoompark. Enter E916 in the search box to learn more about \"Sleep Apnea: After Your Visit. \"   © 5125-1175 Healthwise, Incorporated. Care instructions adapted under license by [employer] (which disclaims liability or warranty for this information). This care instruction is for use with your licensed healthcare professional. If you have questions about a medical condition or this instruction, always ask your healthcare professional. Liliane Henriquez any warranty or liability for your use of this information. PROPER SLEEP HYGIENE    What to avoid  · Do not have drinks with caffeine, such as coffee or black tea, for 8 hours before bed. · Do not smoke or use other types of tobacco near bedtime. Nicotine is a stimulant and can keep you awake. · Avoid drinking alcohol late in the evening, because it can cause you to wake in the middle of the night. · Do not eat a big meal close to bedtime. If you are hungry, eat a light snack. · Do not drink a lot of water close to bedtime, because the need to urinate may wake you up during the night. · Do not read or watch TV in bed. Use the bed only for sleeping and sexual activity. What to try  · Go to bed at the same time every night, and wake up at the same time every morning. Do not take naps during the day. · Keep your bedroom quiet, dark, and cool. · Get regular exercise, but not within 3 to 4 hours of your bedtime. .  · Sleep on a comfortable pillow and mattress.   · If watching the clock makes you anxious, turn it facing away from you so you cannot see the time. · If you worry when you lie down, start a worry book. Well before bedtime, write down your worries, and then set the book and your concerns aside. · Try meditation or other relaxation techniques before you go to bed. · If you cannot fall asleep, get up and go to another room until you feel sleepy. Do something relaxing. Repeat your bedtime routine before you go to bed again. · Make your house quiet and calm about an hour before bedtime. Turn down the lights, turn off the TV, log off the computer, and turn down the volume on music. This can help you relax after a busy day. Drowsy Driving  The [address] [place] [address] Road Traffic Safety Administration cites drowsiness as a causing factor in more than 963,325 police reported crashes annually, resulting in 76,000 injuries and 1,500 deaths. Other surveys suggest 55% of people polled have driven while drowsy in the past year, 23% had fallen asleep but not crashed, 3% crashed, and 2% had and accident due to drowsy driving. Who is at risk? Young Drivers: One study of drowsy driving accidents states that 55% of the drivers were under 25 years. Of those, 75% were male. Shift Workers and Travelers: People who work overnight or travel across time zones frequently are at higher risk of experiencing Circadian Rhythm Disorders. They are trying to work and function when their body is programed to sleep. Sleep Deprived: Lack of sleep has a serious impact on your ability to pay attention or focus on a task. Consistently getting less than the average of 8 hours your body needs creates partial or cumulative sleep deprivation. Untreated Sleep Disorders: Sleep Apnea, Narcolepsy, R.L.S., and other sleep disorders (untreated) prevent a person from getting enough restful sleep. This leads to excessive daytime sleepiness and increases the risk for drowsy driving accidents by up to 7 times.   Medications / Alcohol: Even over the counter medications can cause drowsiness. Medications that impair a drivers attention should have a warning label. Alcohol naturally makes you sleepy and on its own can cause accidents. Combined with excessive drowsiness its effects are amplified. Signs of Drowsy Driving:   * You don't remember driving the last few miles   * You may drift out of your viki   * You are unable to focus and your thoughts wander   * You may yawn more often than normal   * You have difficulty keeping your eyes open / nodding off   * Missing traffic signs, speeding, or tailgating  Prevention-   Good sleep hygiene, lifestyle and behavioral choices have the most impact on drowsy driving. There is no substitute for sleep and the average person requires 8 hours nightly. If you find yourself driving drowsy, stop and sleep. Consider the sleep hygiene tips provided during your visit as well. Medication Refill Policy: Refills for all medications require 1 week advance notice. Please have your pharmacy fax a refill request. We are unable to fax, or call in \"controled substance\" medications and you will need to pick these prescriptions up from our office. WinDensity Activation    Thank you for requesting access to WinDensity. Please follow the instructions below to securely access and download your online medical record. WinDensity allows you to send messages to your doctor, view your test results, renew your prescriptions, schedule appointments, and more. How Do I Sign Up? 1. In your internet browser, go to [email or web address]. 2. Click on the First Time User? Click Here link in the Sign In box. You will see the New Member Sign Up page. 3. Enter your WinDensity Access Code exactly as it appears below. You will not need to use this code after youve completed the sign-up process. If you do not sign up before the expiration date, you must request a new code. WinDensity Access Code:  Activation code not generated  Current WinDensity Status: Active (This is the date your HackSurfer access code will )    4. Enter the last four digits of your Social Security Number (xxxx) and Date of Birth (mm/dd/yyyy) as indicated and click Submit. You will be taken to the next sign-up page. 5. Create a Auctionatat ID. This will be your HackSurfer login ID and cannot be changed, so think of one that is secure and easy to remember. 6. Create a HackSurfer password. You can change your password at any time. 7. Enter your Password Reset Question and Answer. This can be used at a later time if you forget your password. 8. Enter your e-mail address. You will receive e-mail notification when new information is available in [address] 9. Click Sign Up. You can now view and download portions of your medical record. 10. Click the Download Summary menu link to download a portable copy of your medical information. Additional Information    If you have questions, please call [phone]. Remember, HackSurfer is NOT to be used for urgent needs. For medical emergencies, dial 911.

## 2019-07-27 DIAGNOSIS — F41.9 ANXIETY: ICD-10-CM

## 2019-07-29 RX ORDER — BUSPIRONE HYDROCHLORIDE 7.5 MG/1
TABLET ORAL
Qty: 90 TAB | Refills: 1 | Status: SHIPPED | OUTPATIENT
Start: 2019-07-29 | End: 2019-11-17 | Stop reason: SDUPTHER

## 2019-07-31 ENCOUNTER — OFFICE VISIT (OUTPATIENT)
Dept: SLEEP MEDICINE | Age: 49
End: 2019-07-31

## 2019-07-31 ENCOUNTER — HOSPITAL ENCOUNTER (OUTPATIENT)
Dept: SLEEP MEDICINE | Age: 49
Discharge: HOME | End: 2019-07-31
Payer: MEDICAID

## 2019-07-31 VITALS
SYSTOLIC BLOOD PRESSURE: 146 MMHG | WEIGHT: 261 LBS | HEIGHT: 64 IN | DIASTOLIC BLOOD PRESSURE: 87 MMHG | OXYGEN SATURATION: 98 % | BODY MASS INDEX: 44.56 KG/M2 | HEART RATE: 69 BPM

## 2019-07-31 DIAGNOSIS — E66.01 MORBID OBESITY (HCC): ICD-10-CM

## 2019-07-31 DIAGNOSIS — G47.33 OBSTRUCTIVE SLEEP APNEA (ADULT) (PEDIATRIC): Primary | ICD-10-CM

## 2019-07-31 PROCEDURE — 95806 SLEEP STUDY UNATT&RESP EFFT: CPT | Performed by: INTERNAL MEDICINE

## 2019-07-31 NOTE — PROGRESS NOTES
[address] [place]. [place], [address]  Tel.  [phone]  Fax. 100 [facility] 60  [place], [address] [facility]  Tel.  [phone]  Fax. [phone] [address] [facility] Jaye Villavicencio   Tel.  [phone]  Fax. [phone]       S>Alix Saez is a 47 y.o. female seen today to receive a home sleep testing unit (HST). · Patient was educated on proper hookup and operation of the HST. · Instruction forms and documentation were reviewed and signed. · The patient demonstrated good understanding of the HST. O>    Visit Vitals  /87 (BP 1 Location: Right arm, BP Patient Position: Sitting)   Pulse 69   Ht 5' 3.5\" (1.613 m)   Wt 261 lb (118.4 kg)   SpO2 98%   BMI 45.51 kg/m²         A>  1. Obstructive sleep apnea (adult) (pediatric)    2. Morbid obesity ([address]          P>  · General information regarding operations and maintenance of the device was provided. · She was provided information on sleep apnea including coresponding risk factors and the importance of proper treatment. · Follow-up appointment was made to return the HST. She will be contacted once the results have been reviewed. · She was asked to contact our office for any problems regarding her home sleep test study.

## 2019-08-05 ENCOUNTER — OFFICE VISIT (OUTPATIENT)
Dept: INTERNAL MEDICINE CLINIC | Age: 49
End: 2019-08-05

## 2019-08-05 VITALS
DIASTOLIC BLOOD PRESSURE: 76 MMHG | SYSTOLIC BLOOD PRESSURE: 137 MMHG | HEIGHT: 64 IN | HEART RATE: 67 BPM | BODY MASS INDEX: 43.96 KG/M2 | RESPIRATION RATE: 19 BRPM | TEMPERATURE: 98.2 F | WEIGHT: 257.5 LBS | OXYGEN SATURATION: 97 %

## 2019-08-05 DIAGNOSIS — M54.16 LUMBAR RADICULOPATHY: ICD-10-CM

## 2019-08-05 DIAGNOSIS — Z12.4 CERVICAL CANCER SCREENING: ICD-10-CM

## 2019-08-05 DIAGNOSIS — N18.30 CHRONIC RENAL DISEASE, STAGE III (HCC): ICD-10-CM

## 2019-08-05 DIAGNOSIS — G47.33 OSA (OBSTRUCTIVE SLEEP APNEA): ICD-10-CM

## 2019-08-05 DIAGNOSIS — I12.9 HYPERTENSION, RENAL DISEASE: ICD-10-CM

## 2019-08-05 DIAGNOSIS — G62.9 NEUROPATHY: ICD-10-CM

## 2019-08-05 DIAGNOSIS — F41.9 ANXIETY DISORDER, UNSPECIFIED TYPE: ICD-10-CM

## 2019-08-05 DIAGNOSIS — E66.01 MORBID OBESITY (HCC): ICD-10-CM

## 2019-08-05 DIAGNOSIS — E89.0 HYPOTHYROIDISM FOLLOWING RADIOIODINE THERAPY: Primary | Chronic | ICD-10-CM

## 2019-08-05 DIAGNOSIS — M15.9 GENERALIZED OA: ICD-10-CM

## 2019-08-05 RX ORDER — CYCLOBENZAPRINE HCL 10 MG
10 TABLET ORAL
Qty: 30 TAB | Refills: 1 | Status: SHIPPED | OUTPATIENT
Start: 2019-08-05 | End: 2022-08-16 | Stop reason: SDUPTHER

## 2019-08-05 RX ORDER — IBUPROFEN 800 MG/1
800 TABLET ORAL
Qty: 60 TAB | Refills: 1 | Status: SHIPPED | OUTPATIENT
Start: 2019-08-05 | End: 2019-10-19 | Stop reason: SDUPTHER

## 2019-08-05 RX ORDER — GABAPENTIN 100 MG/1
300 CAPSULE ORAL 3 TIMES DAILY
Qty: 90 CAP | Refills: 3 | Status: SHIPPED | OUTPATIENT
Start: 2019-08-05 | End: 2020-11-12

## 2019-08-05 NOTE — PROGRESS NOTES
Jing Medeiros is a 47 y.o. female and presents with Hypertension and Labs  . Subjective:      PMH-HTN-on indapamide  BP Readings from Last 3 Encounters:   08/05/19 137/76   07/31/19 146/87   07/16/19 125/83        Hypothyroidism-pt is currently on 350mcg levothyroxine    -pt was previously on 300 levothyroxine and 25 of liothyronine (cytomel) w/o therapeutic range    Lab Results   Component Value Date/Time    TSH 25.210 (H) 04/19/2019 11:56 AM    TSH 0.016 (L) 02/13/2015 08:42 AM    T4, Free 1.96 (H) 02/13/2015 08:42 AM    T4, Total 4.0 (L) 04/19/2019 11:56 AM        LUCINA-pt needs a new machine. W/u in progress     Vit d def-  Lab Results   Component Value Date/Time    VITAMIN D, 25-HYDROXY 21.5 (L) 04/19/2019 11:56 AM        Cigarette smoker      Anxiety-MUCH improved on buspar     Morbid obesity-  Wt Readings from Last 3 Encounters:   08/05/19 257 lb 8 oz (116.8 kg)   07/31/19 261 lb (118.4 kg)   07/16/19 256 lb 8 oz (116.3 kg)      Chronic lbp      Review of Systems  Constitutional: negative for fevers, chills, anorexia and weight loss  Eyes:   negative for visual disturbance and irritation  ENT:   negative for tinnitus,sore throat,nasal congestion,ear pains. hoarseness  Respiratory:  negative for cough, hemoptysis, dyspnea,wheezing  CV:   negative for chest pain, palpitations, lower extremity edema  GI:   negative for nausea, vomiting, diarrhea, abdominal pain,melena  Neurological:  negative for headaches, dizziness, vertigo, memory problems and gait     Past Medical History:   Diagnosis Date    Chronic kidney disease     kidney failure-left    Depression     GERD (gastroesophageal reflux disease)     Grave's disease     Hypertension     Hypothyroidism following radioiodine therapy     2009    Liver disease     Morbid obesity ([facility] [address]     Nausea & vomiting     Obstructive sleep apnea (adult) (pediatric) 1/8/2013    Thyroid disease      Past Surgical History:   Procedure Laterality Date    COLONOSCOPY N/A 2019    COL[facility] performed by S[facility] ENDOSCOPY    HX CHOLECYSTECTOMY      HX GYN      novasure    HX ORTHOPAEDIC      Left arm    HX UROLOGICAL      bladder sling     Social History     Socioeconomic History    Marital status:      Spouse name: Not on file    Number of children: Not on file    Years of education: Not on file    Highest education level: Not on file   Tobacco Use    Smoking status: Current Some Day Smoker     Packs/day: 0.50     Last attempt to quit: 1/3/2014     Years since quittin.5    Smokeless tobacco: Never Used   Substance and Sexual Activity    Alcohol use: No     Comment: rare, 1 drink a month    Drug use: No    Sexual activity: Yes     Partners: Male     Family History   Problem Relation Age of Onset    Hypertension Mother     Hypertension Father     Other Father     Heart Attack Father     Diabetes Sister     Hypertension Brother      Current Outpatient Medications   Medication Sig Dispense Refill    cyclobenzaprine (FLEXERIL) 10 mg tablet Take 1 Tab by mouth three (3) times daily as needed for Muscle Spasm(s). 30 Tab 1    ibuprofen (MOTRIN) 800 mg tablet Take 1 Tab by mouth every eight (8) hours as needed for Pain (pain). TAKE WITH FOOD 60 Tab 1    gabapentin (NEURONTIN) 100 mg capsule Take 3 Caps by mouth three (3) times daily. Max Daily Amount: 900 mg. 90 Cap 3    busPIRone (BUSPAR) 7.5 mg tablet TAKE 1 TABLET BY MOUTH THREE TIMES A DAY 90 Tab 1    levothyroxine (SYNTHROID) 50 mcg tablet Take 1 Tab by mouth Daily (before breakfast). 30 Tab 3    indapamide (LOZOL) 2.5 mg tablet Take 2 Tabs by mouth daily. 60 Tab 2    levothyroxine (SYNTHROID) 300 mcg tablet Take 1 Tab by mouth Daily (before breakfast).  60 Tab 2     Allergies   Allergen Reactions    Ace Inhibitors Cough    Other Medication Unable to [employer] names, 1 for sinus,1 for bp and 1 for bladder pt states it is called \"sentura\"  Penicillins Rash    Sanctura [Trospium] Other (comments)    Tudorza Pressair [Aclidinium Bromide] Swelling    Vesicare [Solifenacin] Rash and Other (comments)       Objective:  Visit Vitals  /76 (BP 1 Location: Left arm, BP Patient Position: Sitting)   Pulse 67   Temp 98.2 °F (36.8 °C) (Oral)   Resp 19   Ht 5' 3.5\" (1.613 m)   Wt 257 lb 8 oz (116.8 kg)   SpO2 97%   BMI 44.90 kg/m²     Physical Exam:   General appearance - alert, obese, crying. In significant distress  Mental status - alert, oriented to person, place, and time  EYE-MAX, EOMI, corneas normal, no foreign bodies  ENT-ENT exam normal, no neck nodes or sinus tenderness  Mouth - mucous membranes moist, pharynx normal without lesions  Neck - supple, no significant adenopathy   Chest - clear to auscultation, no wheezes, rales or rhonchi, symmetric air entry   Heart - normal rate, regular rhythm, normal S1, S2, 2/6 systolic murmur  Abdomen - soft, nontender, obese +bs  Ext-unable to perform straight leg due to patients discomfort  Skin-Warm and dry. no hyperpigmentation, vitiligo, or suspicious lesions  Neuro -alert, oriented, normal speech, no focal findings or movement disorder noted      Results for orders placed or performed in visit on 04/19/19   TSH REFLEX TO T4   Result Value Ref Range    TSH 25.210 (H) 0.450 - 4.500 uIU/mL   VITAMIN D, 25 HYDROXY   Result Value Ref Range    VITAMIN D, 25-HYDROXY 21.5 (L) 30.0 - 100.0 ng/mL   THYROXINE (T4)   Result Value Ref Range    T4, Total 4.0 (L) 4.5 - 12.0 ug/dL       Assessment/Plan:    ICD-10-CM ICD-9-CM    1. Hypothyroidism following radioiodine therapy E89.0 244.1 TSH REFLEX TO T4   2. Hypertension, renal disease I12.9 403.90      585.9    3. Chronic renal disease, stage III (HCC) N18.3 585.3    4. Lumbar radiculopathy M54.16 724.4 cyclobenzaprine (FLEXERIL) 10 mg tablet   5. Generalized OA M15.9 715.00 ibuprofen (MOTRIN) 800 mg tablet   6.  Neuropathy G62.9 355.9 gabapentin (NEURONTIN) 100 mg capsule   7. Morbid obesity ([facility] [address] E66.01 278.01    8. LUCINA (obstructive sleep apnea) G47.33 327.23    9. Cervical cancer screening Z12.4 V76.2 REFERRAL TO OBSTETRICS AND GYNECOLOGY   10. Anxiety disorder, unspecified type F41.9 300.00      Orders Placed This Encounter    TSH REFLEX TO T4    REFERRAL TO OBSTETRICS AND GYNECOLOGY     Referral Priority:   Routine     Referral Type:   Consultation     Referral Reason:   Specialty Services Required     Requested Specialty:   Obstetrics & Gynecology     Number of Visits Requested:   1    cyclobenzaprine (FLEXERIL) 10 mg tablet     Sig: Take 1 Tab by mouth three (3) times daily as needed for Muscle Spasm(s). Dispense:  30 Tab     Refill:  1    ibuprofen (MOTRIN) 800 mg tablet     Sig: Take 1 Tab by mouth every eight (8) hours as needed for Pain (pain). TAKE WITH FOOD     Dispense:  60 Tab     Refill:  1    gabapentin (NEURONTIN) 100 mg capsule     Sig: Take 3 Caps by mouth three (3) times daily. Max Daily Amount: 900 mg. Dispense:  90 Cap     Refill:  3     1. Hypothyroidism following radioiodine therapy  Recheck on higher dose  - TSH REFLEX TO T4    2. Hypertension, renal disease  Controlled    3. Chronic renal disease, stage III (HCC)  Improved  LIMIT NSAIDS    4. Lumbar radiculopathy    - cyclobenzaprine (FLEXERIL) 10 mg tablet; Take 1 Tab by mouth three (3) times daily as needed for Muscle Spasm(s). Dispense: 30 Tab; Refill: 1    5. Generalized OA    - ibuprofen (MOTRIN) 800 mg tablet; Take 1 Tab by mouth every eight (8) hours as needed for Pain (pain). TAKE WITH FOOD  Dispense: 60 Tab; Refill: 1    6. Neuropathy    - gabapentin (NEURONTIN) 100 mg capsule; Take 3 Caps by mouth three (3) times daily. Max Daily Amount: 900 mg. Dispense: 90 Cap; Refill: 3    7. Morbid obesity ([facility] [address]  Noted    8. LUCINA (obstructive sleep apnea)  Cont w/u    9. Cervical cancer screening    - REFERRAL TO OBSTETRICS AND GYNECOLOGY    10.  Anxiety disorder, unspecified type  Improved w buspar                There are no Patient Instructions on file for this visit. Follow-up and Dispositions    · Return in about 3 months (around 11/5/2019) for htn f/u. I have reviewed with the patient details of the assessment and plan and all questions were answered. Relevent patient education was performed. The most recent lab findings were reviewed with the patient. An After Visit Summary was printed and given to the patient.

## 2019-08-05 NOTE — PROGRESS NOTES
Chief Complaint   Patient presents with    Hypertension    Labs     1. Have you been to the ER, urgent care clinic since your last visit? Hospitalized since your last visit? No    2. Have you seen or consulted any other health care providers outside of the [address] [place] [address] since your last visit? Include any pap smears or colon screening.  [facility]

## 2019-08-06 ENCOUNTER — TELEPHONE (OUTPATIENT)
Dept: SLEEP MEDICINE | Age: 49
End: 2019-08-06

## 2019-08-06 DIAGNOSIS — G47.33 OBSTRUCTIVE SLEEP APNEA (ADULT) (PEDIATRIC): Primary | ICD-10-CM

## 2019-08-06 LAB
T4 SERPL-MCNC: 11.9 UG/DL (ref 4.5–12)
TSH SERPL DL<=0.005 MIU/L-ACNC: 33.66 UIU/ML (ref 0.45–4.5)

## 2019-08-06 NOTE — TELEPHONE ENCOUNTER
HSAT Returned-[facility]    Date of Study: 7/31/19    The following information was gathered from the patients study log:    Further information provided: Log scanned into media

## 2019-08-07 NOTE — TELEPHONE ENCOUNTER
Results of sleep study in [facility]  Lead tech to convey results to patient  HSAT results in [facility]. Test positive for moderate to severe sleep apnea. AHI 26/hour and lowest oxygen saturation was 65%. We had discussed treatment options at initial consultation. Based on the results of the home sleep apnea test, I believe a trial of APAP would be an effective mode of therapy. APAP order attached. she should be seen in the sleep disorder center 4-6 weeks after initiating PAP therapy. The APAP will have modem access so she can call the sleep center if she  has questions/concerns regarding the initial PAP settings. Front staff to Order PAP and call patient and let them know which DME company they should be hearing from after results reviewed with lead support technologist.   Schedule for first adherence visit in 6 weeks.

## 2019-08-09 ENCOUNTER — DOCUMENTATION ONLY (OUTPATIENT)
Dept: SLEEP MEDICINE | Age: 49
End: 2019-08-09

## 2019-08-15 ENCOUNTER — TELEPHONE (OUTPATIENT)
Dept: SLEEP MEDICINE | Age: 49
End: 2019-08-15

## 2019-08-27 DIAGNOSIS — I12.9 HYPERTENSION, RENAL DISEASE: ICD-10-CM

## 2019-08-27 RX ORDER — INDAPAMIDE 2.5 MG/1
TABLET, FILM COATED ORAL
Qty: 180 TAB | Refills: 2 | Status: SHIPPED | OUTPATIENT
Start: 2019-08-27 | End: 2020-07-27 | Stop reason: SDUPTHER

## 2019-10-18 DIAGNOSIS — E89.0 HYPOTHYROIDISM FOLLOWING RADIOIODINE THERAPY: Primary | Chronic | ICD-10-CM

## 2019-10-19 DIAGNOSIS — M15.9 GENERALIZED OA: ICD-10-CM

## 2019-10-21 RX ORDER — IBUPROFEN 800 MG/1
TABLET ORAL
Qty: 60 TAB | Refills: 1 | Status: SHIPPED | OUTPATIENT
Start: 2019-10-21 | End: 2019-12-27

## 2019-11-06 ENCOUNTER — OFFICE VISIT (OUTPATIENT)
Dept: ENDOCRINOLOGY | Age: 49
End: 2019-11-06

## 2019-11-06 ENCOUNTER — OFFICE VISIT (OUTPATIENT)
Dept: INTERNAL MEDICINE CLINIC | Age: 49
End: 2019-11-06

## 2019-11-06 VITALS
HEART RATE: 69 BPM | RESPIRATION RATE: 19 BRPM | OXYGEN SATURATION: 97 % | WEIGHT: 260 LBS | DIASTOLIC BLOOD PRESSURE: 81 MMHG | TEMPERATURE: 97.5 F | SYSTOLIC BLOOD PRESSURE: 167 MMHG | HEIGHT: 64 IN | BODY MASS INDEX: 44.39 KG/M2

## 2019-11-06 VITALS
BODY MASS INDEX: 44.69 KG/M2 | DIASTOLIC BLOOD PRESSURE: 79 MMHG | SYSTOLIC BLOOD PRESSURE: 139 MMHG | HEIGHT: 64 IN | WEIGHT: 261.8 LBS | HEART RATE: 77 BPM

## 2019-11-06 DIAGNOSIS — F41.9 ANXIETY DISORDER, UNSPECIFIED TYPE: ICD-10-CM

## 2019-11-06 DIAGNOSIS — E66.01 MORBID OBESITY (HCC): ICD-10-CM

## 2019-11-06 DIAGNOSIS — G62.9 NEUROPATHY: ICD-10-CM

## 2019-11-06 DIAGNOSIS — E89.0 HYPOTHYROIDISM FOLLOWING RADIOIODINE THERAPY: ICD-10-CM

## 2019-11-06 DIAGNOSIS — E89.0 HYPOTHYROIDISM FOLLOWING RADIOIODINE THERAPY: Primary | ICD-10-CM

## 2019-11-06 DIAGNOSIS — I12.9 HYPERTENSION, RENAL DISEASE: Primary | ICD-10-CM

## 2019-11-06 DIAGNOSIS — G47.33 OSA (OBSTRUCTIVE SLEEP APNEA): ICD-10-CM

## 2019-11-06 DIAGNOSIS — N18.30 CHRONIC RENAL DISEASE, STAGE III (HCC): ICD-10-CM

## 2019-11-06 DIAGNOSIS — M54.16 LUMBAR RADICULOPATHY: ICD-10-CM

## 2019-11-06 RX ORDER — LIOTHYRONINE SODIUM 25 UG/1
12.5 TABLET ORAL DAILY
Qty: 15 TAB | Refills: 3 | Status: SHIPPED | OUTPATIENT
Start: 2019-11-06 | End: 2020-08-05 | Stop reason: SDUPTHER

## 2019-11-06 NOTE — PATIENT INSTRUCTIONS
1) Change your Levothyroxine back to the 300mcg once per day only. 2) Lets restart the Cytomel. I am going to order a 25mcg pill, and I want you to take 1/2 a pill every day. 3) After 6 weeks I want to repeat your thyroid labs again to see how we are doing with this change.

## 2019-11-06 NOTE — PROGRESS NOTES
Chief Complaint   Patient presents with    Thyroid Problem     pcp and pharmacy verified   Records reviwed  History of Present Illness: Leodan Mendoza is a 47 y.o. female who I was asked to see in consult by Dr. Nakul Solis. Pt was previously seen by Dr. Yesi Mercedes, but has not been seen since February 2015. At that time she was taking LT4 300mcg daily and LT3 25mcg daily. She hsa hx of Grave's Hyperthyroidism, which was treated with PALAFOX and she devloped subsequent hypothyroidism. Pt notes that she stopped going to the doctor for several years and only recently started to go back to the her PCP. \"She stopped my Liothirine and increased my dose of Levothyroxine, but she said my levels were messed up and too low so she sent me back to the endocrinologist.    Pt has been following with Dr. Sandy Lindsay for her hypothyroidism and she notes that her thyroid levels have been difficult to control. She is currently taking LT4 350mcg daily. She takes them in AM every morning, by itself with water. She waits 3 hours before she takes her HTN medications. She does have issues of anxiety attacks, and it during these episodes she will have palpitations. She denies CP, SOB, she denies issues of tremors, dysphagia, dysphonia, chocking or hoarseness. She will have issues of diarrhea \"on the first week of the month\". She notes that she will have issues of \"temperature swings\". She notes occasional issues of eye irritation. She does get dry patches on the sides of her eyes from her CPAP. Pt is scheduled for surgery next week to repair \"holes in my retinas\". In addition to the hypothyroidism, Dr. Sandy Lindsay is treating her for HTN and panic attacks/anxiety.     Past Medical History:   Diagnosis Date    Chronic kidney disease     kidney failure-left    Depression     GERD (gastroesophageal reflux disease)     Grave's disease     Hypertension     Hypothyroidism following radioiodine therapy     2009    Liver disease     Morbid obesity ([facility] [address]     Nausea & vomiting     Obstructive sleep apnea (adult) (pediatric) 1/8/2013    Thyroid disease      Past Surgical History:   Procedure Laterality Date    COLONOSCOPY N/A 7/12/2019    [facility] ENDOSCOPY    HX CHOLECYSTECTOMY      HX GYN      novasure    HX ORTHOPAEDIC      Left arm    HX UROLOGICAL      bladder sling     Current Outpatient Medications   Medication Sig    liothyronine (CYTOMEL) 25 mcg tablet Take 0.5 Tabs by mouth daily.  ibuprofen (MOTRIN) 800 mg tablet TAKE 1 TAB BY MOUTH EVERY 8 HOURS AS NEEDED WITH FOOD    indapamide (LOZOL) 2.5 mg tablet TAKE 2 TABLETS BY MOUTH EVERY DAY    cyclobenzaprine (FLEXERIL) 10 mg tablet Take 1 Tab by mouth three (3) times daily as needed for Muscle Spasm(s).  gabapentin (NEURONTIN) 100 mg capsule Take 3 Caps by mouth three (3) times daily. Max Daily Amount: 900 mg.  busPIRone (BUSPAR) 7.5 mg tablet TAKE 1 TABLET BY MOUTH THREE TIMES A DAY    levothyroxine (SYNTHROID) 300 mcg tablet Take 1 Tab by mouth Daily (before breakfast). No current facility-administered medications for this visit.       Allergies   Allergen Reactions    Ace Inhibitors Cough    Other Medication Unable to [employer] names, 1 for sinus,1 for bp and 1 for bladder pt states it is called \"sentura\"    Penicillins Rash    Sanctura [Trospium] Other (comments)    Garcia Vincent [Aclidinium Bromide] Swelling    Vesicare [Solifenacin] Rash and Other (comments)     Family History   Problem Relation Age of Onset    Hypertension Mother     Hypertension Father     Other Father     Heart Attack Father     Diabetes Sister     Hypertension Brother      Social History     Socioeconomic History    Marital status:      Spouse name: Not on file    Number of children: Not on file    Years of education: Not on file    Highest education level: Not on file   Occupational History  Not on file   Social Needs    Financial resource strain: Not on file    Food insecurity:     Worry: Not on file     Inability: Not on file    Transportation needs:     Medical: Not on file     Non-medical: Not on file   Tobacco Use    Smoking status: Current Some Day Smoker     Packs/day: 0.50     Last attempt to quit: 1/3/2014     Years since quittin.8    Smokeless tobacco: Never Used   Substance and Sexual Activity    Alcohol use: No     Comment: rare, 1 drink a month    Drug use: No    Sexual activity: Yes     Partners: Male   Lifestyle    Physical activity:     Days per week: Not on file     Minutes per session: Not on file    Stress: Not on file   Relationships    Social connections:     Talks on phone: Not on file     Gets together: Not on file     Attends [employer] service: Not on file     Active member of club or organization: Not on file     Attends meetings of clubs or organizations: Not on file     Relationship status: Not on file    Intimate partner violence:     Fear of current or ex partner: Not on file     Emotionally abused: Not on file     Physically abused: Not on file     Forced sexual activity: Not on file   Other Topics Concern    Not on file   Social History Narrative    Not on file     Review of Systems:  - Constitutional Symptoms: no fevers, chills, weight loss  - Eyes: no blurry vision or double vision  - Cardiovascular: no chest pain or palpitations  - Respiratory: no cough or shortness of breath  - Gastrointestinal: + abdominal dyscomfort  - Musculoskeletal: no joint pains or weakness  - Integumentary: no rashes  - Neurological: no numbness, tingling, or headaches  - Psychiatric: no depression or anxiety  - Endocrine: no heat or cold intolerance, no polyuria or polydipsia    Physical Examination:  Blood pressure 139/79, pulse 77, height 5' 3.5\" (1.613 m), weight 261 lb 12.8 oz (118.8 kg).   - General: pleasant, no distress, good eye contact  - HEENT: + exopthalmos, no periorbital edema, no scleral/conjunctival injection, EOMI, no lid lag or stare  - Neck: supple, no thyromegaly, masses, lymph nodes, or carotid bruits, no supraclavicular or dorsocervical fat pads  - Cardiovascular: regular, normal rate, normal S1 and S2, no murmurs/rubs/gallops   - Respiratory: clear to auscultation bilaterally  - Gastrointestinal: soft, mild TTP, nondistended, no masses, no hepatosplenomegaly  - Musculoskeletal: no proximal muscle weakness in upper or lower extremities  - Integumentary: no acanthosis nigricans, no abdominal striae, no rashes, no edema  - Neurological: reflexes 2+ at biceps, no tremor  - Psychiatric: normal mood and affect    Data Reviewed:   Component      Latest Ref Rng & Units 8/5/2019 8/5/2019          12:08 PM 12:08 PM   TSH      0.450 - 4.500 uIU/mL  33.660 (H)   T4, Total      4.5 - 12.0 ug/dL 11.9        Assessment/Plan:   1. Hypothyroidism following radioiodine therapy    1) Pt's TSH is significantly high on LT4 300mg. She was previously doing well on a T3/T4 combination. For now I will have pt decrease her LT4 dose back to 300mcg per day and will start her on LT4 12.5 (1/2 of a 25mcg pill) every day. After pt has been on this alternative dose for 6 weeks we will repeat her TFTs and make further adjustments as needed. Will order TFTs today so we have a baseline to see where we are starting from based on the LT4 350mcg daily. Pt voices understanding and agreement with the plan. Pain noted and pt was recommended to call her PCP for further evaluation and treatment, as needed    RTC 3 months    Patient Instructions   1) Change your Levothyroxine back to the 300mcg once per day only. 2) Lets restart the Cytomel. I am going to order a 25mcg pill, and I want you to take 1/2 a pill every day. 3) After 6 weeks I want to repeat your thyroid labs again to see how we are doing with this change.     Follow-up and Dispositions    · Return in about 3 months (around 2/6/2020).          Copy sent to:  [employer]

## 2019-11-06 NOTE — PROGRESS NOTES
Chief Complaint   Patient presents with    Hypertension     1. Have you been to the ER, urgent care clinic since your last visit? Hospitalized since your last visit? No    2. Have you seen or consulted any other health care providers outside of the [address] [place] [address] since your last visit? Include any pap smears or colon screening.  Yes When: Optomitrist

## 2019-11-06 NOTE — PROGRESS NOTES
Jairo Burrows is a 47 y.o. female and presents with Hypertension  . Subjective:      PMH-HTN-on indapamide ONLY   -pts bp elevated today, although has been relatively controlled in the past  BP Readings from Last 3 Encounters:   11/06/19 167/81   08/05/19 137/76   07/31/19 146/87        Hypothyroidism-pt is currently on 350mcg levothyroxine. Pt relays she takes in the AM, fasting    -pt was previously on 300 levothyroxine and 25 of liothyronine (cytomel) w/o therapeutic range    -pt has an appt w endo today    Lab Results   Component Value Date/Time    TSH 33.660 (H) 08/05/2019 12:08 PM    TSH 0.016 (L) 02/13/2015 08:42 AM    T4, Free 1.96 (H) 02/13/2015 08:42 AM    T4, Total 11.9 08/05/2019 12:08 PM        LUCINA-pt needs a new machine. W/u in progress     Vit d def-  Lab Results   Component Value Date/Time    VITAMIN D, 25-HYDROXY 21.5 (L) 04/19/2019 11:56 AM        Cigarette smoker      Anxiety-MUCH improved on buspar     Morbid obesity-  Wt Readings from Last 3 Encounters:   11/06/19 260 lb (117.9 kg)   08/05/19 257 lb 8 oz (116.8 kg)   07/31/19 261 lb (118.4 kg)      Chronic lbp w radiculopathy-pt reports that gabapentin 300mg TID relieves her back pain, but not the neuropathy   -pt declines maxing dose of gabapentin due to SE      Review of Systems  Constitutional: negative for fevers, chills, anorexia and weight loss  Eyes:   negative for visual disturbance and irritation  ENT:   negative for tinnitus,sore throat,nasal congestion,ear pains. hoarseness  Respiratory:  negative for cough, hemoptysis, dyspnea,wheezing  CV:   negative for chest pain, palpitations, lower extremity edema  GI:   negative for nausea, vomiting, diarrhea, abdominal pain,melena  Neurological:  negative for headaches, dizziness, vertigo, memory problems and gait     Past Medical History:   Diagnosis Date    Chronic kidney disease     kidney failure-left    Depression     GERD (gastroesophageal reflux disease)     Grave's disease  Hypertension     Hypothyroidism following radioiodine therapy     2009    Liver disease     Morbid obesity ([address]     Nausea & vomiting     Obstructive sleep apnea (adult) (pediatric) 2013    Thyroid disease      Past Surgical History:   Procedure Laterality Date    COLONOSCOPY N/A 2019    COLONOSCOPY performed by [facility]    HX CHOLECYSTECTOMY      HX GYN      novasure    HX ORTHOPAEDIC      Left arm    HX UROLOGICAL      bladder sling     Social History     Socioeconomic History    Marital status:      Spouse name: Not on file    Number of children: Not on file    Years of education: Not on file    Highest education level: Not on file   Tobacco Use    Smoking status: Current Some Day Smoker     Packs/day: 0.50     Last attempt to quit: 1/3/2014     Years since quittin.8    Smokeless tobacco: Never Used   Substance and Sexual Activity    Alcohol use: No     Comment: rare, 1 drink a month    Drug use: No    Sexual activity: Yes     Partners: Male     Family History   Problem Relation Age of Onset    Hypertension Mother     Hypertension Father     Other Father     Heart Attack Father     Diabetes Sister     Hypertension Brother      Current Outpatient Medications   Medication Sig Dispense Refill    ibuprofen (MOTRIN) 800 mg tablet TAKE 1 TAB BY MOUTH EVERY 8 HOURS AS NEEDED WITH FOOD 60 Tab 1    indapamide (LOZOL) 2.5 mg tablet TAKE 2 TABLETS BY MOUTH EVERY  Tab 2    cyclobenzaprine (FLEXERIL) 10 mg tablet Take 1 Tab by mouth three (3) times daily as needed for Muscle Spasm(s). 30 Tab 1    gabapentin (NEURONTIN) 100 mg capsule Take 3 Caps by mouth three (3) times daily. Max Daily Amount: 900 mg. 90 Cap 3    busPIRone (BUSPAR) 7.5 mg tablet TAKE 1 TABLET BY MOUTH THREE TIMES A DAY 90 Tab 1    levothyroxine (SYNTHROID) 50 mcg tablet Take 1 Tab by mouth Daily (before breakfast).  30 Tab 3    levothyroxine (SYNTHROID) 300 mcg tablet Take 1 Tab by mouth Daily (before breakfast). 60 Tab 2     Allergies   Allergen Reactions    Ace Inhibitors Cough    Other Medication Unable to [employer] names, 1 for sinus,1 for bp and 1 for bladder pt states it is called \"sentura\"    Penicillins Rash    Sanctura [Trospium] Other (comments)    Tudorza Pressair [Aclidinium Bromide] Swelling    Vesicare [Solifenacin] Rash and Other (comments)       Objective:  Visit Vitals  /81 (BP 1 Location: Left arm, BP Patient Position: Sitting)   Pulse 69   Temp 97.5 °F (36.4 °C) (Oral)   Resp 19   Ht 5' 3.5\" (1.613 m)   Wt 260 lb (117.9 kg)   SpO2 97%   BMI 45.33 kg/m²     Physical Exam:   General appearance - alert, obese, in significant distress due to pain  Mental status - alert, oriented to person, place, and time  EYE-MAX, EOMI, corneas normal, no foreign bodies  Neck - supple, no significant adenopathy   Chest - clear to auscultation, no wheezes, rales or rhonchi, symmetric air entry   Heart - normal rate, regular rhythm, normal S1, S2, 2/6 systolic murmur  Abdomen - soft, nontender, obese +bs  Ext-unable to perform straight leg due to patients discomfort  Skin-Warm and dry. no hyperpigmentation, vitiligo, or suspicious lesions  Neuro -alert, oriented, normal speech, no focal findings or movement disorder noted      Results for orders placed or performed in visit on 08/05/19   TSH REFLEX TO T4   Result Value Ref Range    TSH 33.660 (H) 0.450 - 4.500 uIU/mL   THYROXINE (T4)   Result Value Ref Range    T4, Total 11.9 4.5 - 12.0 ug/dL       Assessment/Plan:    ICD-10-CM ICD-9-CM    1. Hypertension, renal disease I12.9 403.90      585.9    2. Hypothyroidism following radioiodine therapy E89.0 244.1    3. Lumbar radiculopathy M54.16 724.4 REFERRAL TO PAIN MANAGEMENT   4. Neuropathy G62.9 355.9 REFERRAL TO PAIN MANAGEMENT   5. Chronic renal disease, stage III (HCC) N18.3 585.3    6. Morbid obesity ([address] E66.01 278.01    7.  LUCINA (obstructive sleep apnea) G47.33 327.23    8. Anxiety disorder, unspecified type F41.9 300.00      Orders Placed This Encounter    REFERRAL TO PAIN MANAGEMENT     Referral Priority:   Routine     Referral Type:   Consultation     Referral Reason:   Specialty Services Required     Number of Visits Requested:   1     1. Hypertension, renal disease  Short term f/u  Pt is very resistant to reevaluating her current bp regimen  D/w pt will change regimen if still elevated NV    2. Hypothyroidism following radioiodine therapy  Refer to endo for evaluation  ? Cytomel a better option    3. Lumbar radiculopathy    - REFERRAL TO PAIN MANAGEMENT    4. Neuropathy    - REFERRAL TO PAIN MANAGEMENT    5. Chronic renal disease, stage III (HCC)  Due to bp and obesity most likely    6. Morbid obesity ([address]  Pt declines referral for weight management     7. LUCINA (obstructive sleep apnea)  Noted    8. Anxiety disorder, unspecified type  Improved on buspar        Declines flu vaccine          There are no Patient Instructions on file for this visit. Follow-up and Dispositions    · Return in about 6 weeks (around 12/18/2019) for bp check . I have reviewed with the patient details of the assessment and plan and all questions were answered. Relevent patient education was performed. The most recent lab findings were reviewed with the patient. An After Visit Summary was printed and given to the patient.

## 2019-11-07 LAB
FT4I SERPL CALC-MCNC: 5.8 (ref 1.2–4.9)
T3RU NFR SERPL: 34 % (ref 24–39)
T4 FREE SERPL-MCNC: 2.62 NG/DL (ref 0.82–1.77)
T4 SERPL-MCNC: 17.1 UG/DL (ref 4.5–12)
TSH SERPL DL<=0.005 MIU/L-ACNC: 2.15 UIU/ML (ref 0.45–4.5)

## 2019-11-14 ENCOUNTER — OFFICE VISIT (OUTPATIENT)
Dept: OBGYN CLINIC | Age: 49
End: 2019-11-14

## 2019-11-14 VITALS
SYSTOLIC BLOOD PRESSURE: 151 MMHG | WEIGHT: 264.2 LBS | TEMPERATURE: 97.4 F | DIASTOLIC BLOOD PRESSURE: 79 MMHG | HEIGHT: 64 IN | BODY MASS INDEX: 45.11 KG/M2 | RESPIRATION RATE: 20 BRPM | HEART RATE: 68 BPM

## 2019-11-14 DIAGNOSIS — Z01.419 WELL WOMAN EXAM WITH ROUTINE GYNECOLOGICAL EXAM: Primary | ICD-10-CM

## 2019-11-14 NOTE — PROGRESS NOTES
Chief Complaint   Patient presents with    Well Woman     Last Pap 2012. Last mammogram 3/2019.     Pt voices no c/o.    3 most recent PHQ Screens 11/14/2019   Little interest or pleasure in doing things Not at all   Feeling down, depressed, irritable, or hopeless Not at all   Total Score PHQ 2 0   Trouble falling or staying asleep, or sleeping too much -   Feeling tired or having little energy -   Poor appetite, weight loss, or overeating -   Feeling bad about yourself - or that you are a failure or have let yourself or your family down -   Trouble concentrating on things such as school, work, reading, or watching TV -   Moving or speaking so slowly that other people could have noticed; or the opposite being so fidgety that others notice -   Thoughts of being better off dead, or hurting yourself in some way -   PHQ 9 Score -

## 2019-11-14 NOTE — PROGRESS NOTES
NEW PATIENT EXAM  Geno/Post Menopause    SUBJECTIVE: Lisa Novachkhadar is a 47 y.o. female s/p ablation who presents for well woman exam.  Pt. Remains a light smoker and reports her last pap was 2012. Pt. Had mammogram earlier this year that was reported as normal.  Pt denies any complaints. Patient's last menstrual period was 06/14/2005.     GYN History  Menopause:  10 years ago  Post menopausal bleeding:   NO  Sexually active: NO  Contraception:  none  Sexually transmitted diseases/infections: NO    Last pap: 2012  The prior Pap result: normal  Last Mammogram:  2019    Past Medical History:   Diagnosis Date    Chronic kidney disease     kidney failure-left    Depression     GERD (gastroesophageal reflux disease)     Grave's disease     Hypertension     Hypothyroidism following radioiodine therapy     2009    Liver disease     Morbid obesity ([address]     Nausea & vomiting     Obstructive sleep apnea (adult) (pediatric) 1/8/2013    Thyroid disease      Past Surgical History:   Procedure Laterality Date    COLONOSCOPY N/A 7/12/2019    COL[facility]COPY    HX CHOLECYSTECTOMY      HX GYN  2005    novasure    HX ORTHOPAEDIC      Left arm    HX UROLOGICAL      bladder sling     OB History    None       Family History   Problem Relation Age of Onset    Hypertension Mother     Hypertension Father     Other Father     Heart Attack Father     Diabetes Sister     Hypertension Brother      Social History     Socioeconomic History    Marital status:      Spouse name: Not on file    Number of children: Not on file    Years of education: Not on file    Highest education level: Not on file   Occupational History    Not on file   Social Needs    Financial resource strain: Not on file    Food insecurity:     Worry: Not on file     Inability: Not on file    Transportation needs:     Medical: Not on file     Non-medical: Not on file   Tobacco Use    Smoking status: Current Some Day Smoker     Packs/day: 0.25    Smokeless tobacco: Never Used   Substance and Sexual Activity    Alcohol use: No     Comment: rare, 1 drink a month    Drug use: No    Sexual activity: Not Currently   Lifestyle    Physical activity:     Days per week: Not on file     Minutes per session: Not on file    Stress: Not on file   Relationships    Social connections:     Talks on phone: Not on file     Gets together: Not on file     Attends Jew service: Not on file     Active member of club or organization: Not on file     Attends meetings of clubs or organizations: Not on file     Relationship status: Not on file    Intimate partner violence:     Fear of current or ex partner: Not on file     Emotionally abused: Not on file     Physically abused: Not on file     Forced sexual activity: Not on file   Other Topics Concern    Not on file   Social History Narrative    Not on file       Current Outpatient Medications   Medication Sig Dispense Refill    liothyronine (CYTOMEL) 25 mcg tablet Take 0.5 Tabs by mouth daily. 15 Tab 3    ibuprofen (MOTRIN) 800 mg tablet TAKE 1 TAB BY MOUTH EVERY 8 HOURS AS NEEDED WITH FOOD 60 Tab 1    indapamide (LOZOL) 2.5 mg tablet TAKE 2 TABLETS BY MOUTH EVERY  Tab 2    cyclobenzaprine (FLEXERIL) 10 mg tablet Take 1 Tab by mouth three (3) times daily as needed for Muscle Spasm(s). 30 Tab 1    gabapentin (NEURONTIN) 100 mg capsule Take 3 Caps by mouth three (3) times daily. Max Daily Amount: 900 mg. 90 Cap 3    busPIRone (BUSPAR) 7.5 mg tablet TAKE 1 TABLET BY MOUTH THREE TIMES A DAY 90 Tab 1    levothyroxine (SYNTHROID) 300 mcg tablet Take 1 Tab by mouth Daily (before breakfast). 60 Tab 2         Review of Systems:   Complete review of systems reviewed from social and history data forms. Pertinent positives in HPI.       Objective:     Visit Vitals  /79 (BP 1 Location: Right arm, BP Patient Position: Sitting)   Pulse 68   Temp 97.4 °F (36.3 °C) (Oral)   Resp 20   Ht 5' 3.5\" (1.613 m)   Wt 264 lb 3.2 oz (119.8 kg)   LMP 06/14/2005   BMI 46.07 kg/m²       General:  alert, cooperative, no distress, appears stated age   Skin:  Normal.   Lymph Nodes:  Cervical, supraclavicular, and axillary nodes normal.   Breast Exam: normal appearance, no masses or tenderness    Lungs:  clear to auscultation bilaterally   Heart:  regular rate and rhythm, S1, S2 normal, no murmur, click, rub or gallop   Abdomen: soft, non-tender. No masses,  no organomegaly   Back:  Costovertebral angle tenderness absent   Genitourinary: BUS normal. Introitus normal. Normal appearing vaginal epithelium, Vaginal discharge described as normal and physiologic.,  Normal cervix without lesions or tenderness, Uterus normal size anteverted. NT., Adnexa normal in size left and right without tenderness. Extremities:  extremities normal, atraumatic, no cyanosis or edema     Neurologic:  negative   Psychiatric:  non focal     ASSESSMENT:    Well woman exam    Plan:  Pap taken. Mammogram current. Smoking cessation encouraged. RTO 1 year. Pt. Voices understanding of treatment plan.       Arman Lesches, NP

## 2019-11-17 DIAGNOSIS — F41.9 ANXIETY: ICD-10-CM

## 2019-11-18 LAB
CYTOLOGIST CVX/VAG CYTO: NORMAL
CYTOLOGY CVX/VAG DOC CYTO: NORMAL
CYTOLOGY CVX/VAG DOC THIN PREP: NORMAL
DX ICD CODE: NORMAL
HPV I/H RISK 1 DNA CVX QL PROBE+SIG AMP: NEGATIVE
Lab: NORMAL
OTHER STN SPEC: NORMAL
STAT OF ADQ CVX/VAG CYTO-IMP: NORMAL

## 2019-11-18 RX ORDER — BUSPIRONE HYDROCHLORIDE 7.5 MG/1
TABLET ORAL
Qty: 90 TAB | Refills: 1 | Status: SHIPPED | OUTPATIENT
Start: 2019-11-18 | End: 2021-06-22 | Stop reason: SDUPTHER

## 2019-12-18 ENCOUNTER — OFFICE VISIT (OUTPATIENT)
Dept: INTERNAL MEDICINE CLINIC | Age: 49
End: 2019-12-18

## 2019-12-18 VITALS
DIASTOLIC BLOOD PRESSURE: 72 MMHG | OXYGEN SATURATION: 100 % | RESPIRATION RATE: 19 BRPM | WEIGHT: 268 LBS | BODY MASS INDEX: 45.75 KG/M2 | HEIGHT: 64 IN | SYSTOLIC BLOOD PRESSURE: 170 MMHG | HEART RATE: 62 BPM | TEMPERATURE: 97.5 F

## 2019-12-18 DIAGNOSIS — E89.0 HYPOTHYROIDISM FOLLOWING RADIOIODINE THERAPY: ICD-10-CM

## 2019-12-18 DIAGNOSIS — I10 ESSENTIAL HYPERTENSION: Primary | Chronic | ICD-10-CM

## 2019-12-18 RX ORDER — AMLODIPINE BESYLATE 5 MG/1
5 TABLET ORAL DAILY
Qty: 60 TAB | Refills: 2 | Status: SHIPPED | OUTPATIENT
Start: 2019-12-18 | End: 2020-07-27 | Stop reason: SDUPTHER

## 2019-12-18 NOTE — PROGRESS NOTES
Alexey Segal is a 47 y.o. female and presents with Hypertension  . Subjective:      PMH-HTN-on indapamide 5mg ONLY     BP Readings from Last 3 Encounters:   12/18/19 170/72   11/14/19 151/79   11/06/19 139/79        Hypothyroidism-pt is currently on 350mcg levothyroxine. Pt relays she takes in the AM, fasting    -pt was previously on 300 levothyroxine and 25 of liothyronine (cytomel) w/o therapeutic range    -pt has an appt w endo today    Lab Results   Component Value Date/Time    TSH 2.150 11/06/2019 03:21 PM    T3 Uptake 34 11/06/2019 03:21 PM    T4, Free 2.62 (H) 11/06/2019 03:21 PM    T4, Total 17.1 (H) 11/06/2019 03:21 PM        LUCINA-pt uses CPAP     Vit d def-  Lab Results   Component Value Date/Time    VITAMIN D, 25-HYDROXY 21.5 (L) 04/19/2019 11:56 AM        Cigarette smoker      Anxiety-MUCH improved on buspar     Morbid obesity-  Wt Readings from Last 3 Encounters:   12/18/19 268 lb (121.6 kg)   11/14/19 264 lb 3.2 oz (119.8 kg)   11/06/19 261 lb 12.8 oz (118.8 kg)      Chronic lbp w radiculopathy-pt reports that gabapentin 300mg TID relieves her back pain, but not the neuropathy    -pt declines maxing dose of gabapentin due to SE      Review of Systems  Constitutional: negative for fevers, chills, anorexia and weight loss  Eyes:   negative for visual disturbance and irritation  ENT:   negative for tinnitus,sore throat,nasal congestion,ear pains. hoarseness  Respiratory:  negative for cough, hemoptysis, dyspnea,wheezing  CV:   negative for chest pain, palpitations, lower extremity edema  GI:   negative for nausea, vomiting, diarrhea, abdominal pain,melena  Neurological:  negative for headaches, dizziness, vertigo, memory problems and gait     Past Medical History:   Diagnosis Date    Chronic kidney disease     kidney failure-left    Depression     GERD (gastroesophageal reflux disease)     Grave's disease     Hypertension     Hypothyroidism following radioiodine therapy     2009    Liver disease     Morbid obesity ([facility] [address]     Nausea & vomiting     Obstructive sleep apnea (adult) (pediatric) 1/8/2013    Thyroid disease      Past Surgical History:   Procedure Laterality Date    COLONOSCOPY N/A 7/12/2019    COL[facility] at [facility]COPY    HX CHOLECYSTECTOMY      HX GYN  2005    novasure    HX ORTHOPAEDIC      Left arm    HX UROLOGICAL      bladder sling     Social History     Socioeconomic History    Marital status:      Spouse name: Not on file    Number of children: Not on file    Years of education: Not on file    Highest education level: Not on file   Tobacco Use    Smoking status: Current Some Day Smoker     Packs/day: 0.25    Smokeless tobacco: Never Used   Substance and Sexual Activity    Alcohol use: No     Comment: rare, 1 drink a month    Drug use: No    Sexual activity: Not Currently     Family History   Problem Relation Age of Onset    Hypertension Mother     Hypertension Father     Other Father     Heart Attack Father     Diabetes Sister     Hypertension Brother      Current Outpatient Medications   Medication Sig Dispense Refill    amLODIPine (NORVASC) 5 mg tablet Take 1 Tab by mouth daily. 60 Tab 2    busPIRone (BUSPAR) 7.5 mg tablet TAKE 1 TABLET BY MOUTH THREE TIMES A DAY 90 Tab 1    liothyronine (CYTOMEL) 25 mcg tablet Take 0.5 Tabs by mouth daily. 15 Tab 3    ibuprofen (MOTRIN) 800 mg tablet TAKE 1 TAB BY MOUTH EVERY 8 HOURS AS NEEDED WITH FOOD 60 Tab 1    indapamide (LOZOL) 2.5 mg tablet TAKE 2 TABLETS BY MOUTH EVERY  Tab 2    cyclobenzaprine (FLEXERIL) 10 mg tablet Take 1 Tab by mouth three (3) times daily as needed for Muscle Spasm(s). 30 Tab 1    gabapentin (NEURONTIN) 100 mg capsule Take 3 Caps by mouth three (3) times daily. Max Daily Amount: 900 mg. 90 Cap 3    levothyroxine (SYNTHROID) 300 mcg tablet Take 1 Tab by mouth Daily (before breakfast).  60 Tab 2     Allergies   Allergen Reactions    Ace Inhibitors Cough    Other Medication Unable to [employer] names, 1 for sinus,1 for bp and 1 for bladder pt states it is called \"sentura\"    Penicillins Rash    Sanctura [Trospium] Other (comments)    Tudorza Pressair [Aclidinium Bromide] Swelling    Vesicare [Solifenacin] Rash and Other (comments)       Objective:  Visit Vitals  /72 (BP 1 Location: Left arm, BP Patient Position: Sitting)   Pulse 62   Temp 97.5 °F (36.4 °C) (Oral)   Resp 19   Ht 5' 3.5\" (1.613 m)   Wt 268 lb (121.6 kg)   SpO2 100%   BMI 46.73 kg/m²     Physical Exam:   General appearance - alert, obese, in NAD  Mental status - alert, oriented to person, place, and time  EYE-MAX, EOMI, corneas normal, no foreign bodies  Neck - supple, no significant adenopathy   Chest - clear to auscultation, no wheezes, rales or rhonchi, symmetric air entry   Heart - normal rate, regular rhythm, normal S1, S2, 2/6 systolic murmur  Abdomen - soft, nontender, obese +bs  Ext-unable to perform straight leg due to patients discomfort  Skin-Warm and dry. no hyperpigmentation, vitiligo, or suspicious lesions  Neuro -alert, oriented, normal speech, no focal findings or movement disorder noted      Results for orders placed or performed in visit on 11/14/19   PAP IG, HPV AND RFX HPV 68/57,78([id number])   Result Value Ref Range    Diagnosis Comment     Specimen adequacy Comment     Clinician provided ICD10 Comment     Performed by: Comment     . Miguel Breaux Note: Comment     Test methodology Comment     HPV DNA Probe, High Risk Negative Negative       Assessment/Plan:    ICD-10-CM ICD-9-CM    1. Essential hypertension I10 401.9 amLODIPine (NORVASC) 5 mg tablet     Orders Placed This Encounter    amLODIPine (NORVASC) 5 mg tablet     Sig: Take 1 Tab by mouth daily. Dispense:  60 Tab     Refill:  2     1. Essential hypertension  Uncontrolled  Add amlodipine  Cont indapamide  - amLODIPine (NORVASC) 5 mg tablet; Take 1 Tab by mouth daily.   Dispense: 60 Tab; Refill: 2          There are no Patient Instructions on file for this visit. I have reviewed with the patient details of the assessment and plan and all questions were answered. Relevent patient education was performed. The most recent lab findings were reviewed with the patient. An After Visit Summary was printed and given to the patient.

## 2019-12-18 NOTE — PROGRESS NOTES
Chief Complaint   Patient presents with    Hypertension     1. Have you been to the ER, urgent care clinic since your last visit? Hospitalized since your last visit? No    2. Have you seen or consulted any other health care providers outside of the [address] [place] [address] since your last visit? Include any pap smears or colon screening.  No

## 2019-12-21 DIAGNOSIS — M15.9 GENERALIZED OA: ICD-10-CM

## 2019-12-27 RX ORDER — IBUPROFEN 800 MG/1
TABLET ORAL
Qty: 60 TAB | Refills: 1 | Status: SHIPPED | OUTPATIENT
Start: 2019-12-27 | End: 2022-02-15 | Stop reason: SDUPTHER

## 2019-12-30 ENCOUNTER — OFFICE VISIT (OUTPATIENT)
Dept: SLEEP MEDICINE | Age: 49
End: 2019-12-30

## 2019-12-30 ENCOUNTER — DOCUMENTATION ONLY (OUTPATIENT)
Dept: SLEEP MEDICINE | Age: 49
End: 2019-12-30

## 2019-12-30 VITALS
SYSTOLIC BLOOD PRESSURE: 127 MMHG | HEART RATE: 65 BPM | DIASTOLIC BLOOD PRESSURE: 82 MMHG | OXYGEN SATURATION: 97 % | HEIGHT: 64 IN | WEIGHT: 260 LBS | BODY MASS INDEX: 44.39 KG/M2

## 2019-12-30 DIAGNOSIS — I10 ESSENTIAL HYPERTENSION: ICD-10-CM

## 2019-12-30 DIAGNOSIS — E07.9 THYROID DISEASE: ICD-10-CM

## 2019-12-30 DIAGNOSIS — G47.33 OBSTRUCTIVE SLEEP APNEA (ADULT) (PEDIATRIC): Primary | ICD-10-CM

## 2019-12-30 NOTE — PATIENT INSTRUCTIONS
[address] [facility] [place]. [place], [address]  Tel.  [phone]  Fax. 100 [facility] 60  [place], [address] [facility]  Tel.  [phone]  Fax. [phone] [address]  Tel.  [phone]  Fax. [phone]     Learning About CPAP for Sleep Apnea  What is CPAP? CPAP is a small machine that you use at home every night while you sleep. It increases air pressure in your throat to keep your airway open. When you have sleep apnea, this can help you sleep better so you feel much better. CPAP stands for \"continuous positive airway pressure. \"  The CPAP machine will have one of the following:  · A mask that covers your nose and mouth  · Prongs that fit into your nose  · A mask that covers your nose only, the most common type. This type is called NCPAP. The N stands for \"nasal.\"  Why is it done? CPAP is usually the best treatment for obstructive sleep apnea. It is the first treatment choice and the most widely used. Your doctor may suggest CPAP if you have:  · Moderate to severe sleep apnea. · Sleep apnea and coronary artery disease (CAD) or heart failure. How does it help? · CPAP can help you have more normal sleep, so you feel less sleepy and more alert during the daytime. · CPAP may help keep heart failure or other heart problems from getting worse. · NCPAP may help lower your blood pressure. · If you use CPAP, your bed partner may also sleep better because you are not snoring or restless. What are the side effects? Some people who use CPAP have:  · A dry or stuffy nose and a sore throat. · Irritated skin on the face. · Sore eyes. · Bloating. If you have any of these problems, work with your doctor to fix them. Here are some things you can try:  · Be sure the mask or nasal prongs fit well. · See if your doctor can adjust the pressure of your CPAP. · If your nose is dry, try a humidifier.   · If your nose is runny or stuffy, try decongestant medicine or a steroid nasal spray. If these things do not help, you might try a different type of machine. Some machines have air pressure that adjusts on its own. Others have air pressures that are different when you breathe in than when you breathe out. This may reduce discomfort caused by too much pressure in your nose. Where can you learn more? Go to [employer].be  Enter Juan Dominguez in the search box to learn more about \"Learning About CPAP for Sleep Apnea. \"   © 9339-6791 Healthwise, Incorporated. Care instructions adapted under license by [facility] [employer] (which disclaims liability or warranty for this information). This care instruction is for use with your licensed healthcare professional. If you have questions about a medical condition or this instruction, always ask your healthcare professional. [address] any warranty or liability for your use of this information. Content Version: 5.8.57991; Last Revised: January 11, 2010  PROPER SLEEP HYGIENE    What to avoid  · Do not have drinks with caffeine, such as coffee or black tea, for 8 hours before bed. · Do not smoke or use other types of tobacco near bedtime. Nicotine is a stimulant and can keep you awake. · Avoid drinking alcohol late in the evening, because it can cause you to wake in the middle of the night. · Do not eat a big meal close to bedtime. If you are hungry, eat a light snack. · Do not drink a lot of water close to bedtime, because the need to urinate may wake you up during the night. · Do not read or watch TV in bed. Use the bed only for sleeping and sexual activity. What to try  · Go to bed at the same time every night, and wake up at the same time every morning. Do not take naps during the day. · Keep your bedroom quiet, dark, and cool. · Get regular exercise, but not within 3 to 4 hours of your bedtime. .  · Sleep on a comfortable pillow and mattress.   · If watching the clock makes you anxious, turn it facing away from you so you cannot see the time. · If you worry when you lie down, start a worry book. Well before bedtime, write down your worries, and then set the book and your concerns aside. · Try meditation or other relaxation techniques before you go to bed. · If you cannot fall asleep, get up and go to another room until you feel sleepy. Do something relaxing. Repeat your bedtime routine before you go to bed again. · Make your house quiet and calm about an hour before bedtime. Turn down the lights, turn off the TV, log off the computer, and turn down the volume on music. This can help you relax after a busy day. Drowsy Driving: The [facility] cites drowsiness as a causing factor in more than 402,825 police reported crashes annually, resulting in 76,000 injuries and 1,500 deaths. Other surveys suggest 55% of people polled have driven while drowsy in the past year, 23% had fallen asleep but not crashed, 3% crashed, and 2% had and accident due to drowsy driving. Who is at risk? Young Drivers: One study of drowsy driving accidents states that 55% of the drivers were under 25 years. Of those, 75% were male. Shift Workers and Travelers: People who work overnight or travel across time zones frequently are at higher risk of experiencing Circadian Rhythm Disorders. They are trying to work and function when their body is programed to sleep. Sleep Deprived: Lack of sleep has a serious impact on your ability to pay attention or focus on a task. Consistently getting less than the average of 8 hours your body needs creates partial or cumulative sleep deprivation. Untreated Sleep Disorders: Sleep Apnea, Narcolepsy, R.L.S., and other sleep disorders (untreated) prevent a person from getting enough restful sleep. This leads to excessive daytime sleepiness and increases the risk for drowsy driving accidents by up to 7 times.   Medications / Alcohol: Even over the counter medications can cause drowsiness. Medications that impair a drivers attention should have a warning label. Alcohol naturally makes you sleepy and on its own can cause accidents. Combined with excessive drowsiness its effects are amplified. Signs of Drowsy Driving:   * You don't remember driving the last few miles   * You may drift out of your viki   * You are unable to focus and your thoughts wander   * You may yawn more often than normal   * You have difficulty keeping your eyes open / nodding off   * Missing traffic signs, speeding, or tailgating  Prevention-   Good sleep hygiene, lifestyle and behavioral choices have the most impact on drowsy driving. There is no substitute for sleep and the average person requires 8 hours nightly. If you find yourself driving drowsy, stop and sleep. Consider the sleep hygiene tips provided during your visit as well. Medication Refill Policy: Refills for all medications require 1 week advance notice. Please have your pharmacy fax a refill request. We are unable to fax, or call in \"controled substance\" medications and you will need to pick these prescriptions up from our office. Style on Screen Activation    Thank you for requesting access to Style on Screen. Please follow the instructions below to securely access and download your online medical record. Style on Screen allows you to send messages to your doctor, view your test results, renew your prescriptions, schedule appointments, and more. How Do I Sign Up? 1. In your internet browser, go to [email or web address]. 2. Click on the First Time User? Click Here link in the Sign In box. You will see the New Member Sign Up page. 3. Enter your Style on Screen Access Code exactly as it appears below. You will not need to use this code after youve completed the sign-up process. If you do not sign up before the expiration date, you must request a new code. Style on Screen Access Code:  Activation code not generated  Current DigitalTown Status: Active (This is the date your DigitalTown access code will )    4. Enter the last four digits of your Social Security Number (xxxx) and Date of Birth (mm/dd/yyyy) as indicated and click Submit. You will be taken to the next sign-up page. 5. Create a Critical Linkst ID. This will be your DigitalTown login ID and cannot be changed, so think of one that is secure and easy to remember. 6. Create a DigitalTown password. You can change your password at any time. 7. Enter your Password Reset Question and Answer. This can be used at a later time if you forget your password. 8. Enter your e-mail address. You will receive e-mail notification when new information is available in [address] 9. Click Sign Up. You can now view and download portions of your medical record. 10. Click the Download Summary menu link to download a portable copy of your medical information. Additional Information    If you have questions, please call [phone]. Remember, DigitalTown is NOT to be used for urgent needs. For medical emergencies, dial 911.

## 2019-12-30 NOTE — PROGRESS NOTES
[address] [facility] [address] [place]. [place], [address]   Tel.  [phone]   Fax. [address] [facility] [address]   [place], [address] [facility]   Tel.  [phone]   Fax. [phone] [address] [facility] Jaye Villavicencio    Tel.  [phone]   Fax. [phone]       Subjective:   Adal Yeh is a 47 y.o. female seen for a positive airway pressure follow-up, last seen by Dr. Abril Pham on 7/16/2019, prior notes reviewed in detail. Home sleep test 7/2019 showed AHI of 26.2/hr with a lowest SaO2 of 65%. She  is here today for 1st adherence. She reports no problems using the device. The following concerns reviewed:    Drowsiness no Problems exhaling no   Snoring no Forget to put on no   Mask Comfortable yes Can't fall asleep no   Dry Mouth no Mask falls off no   Air Leaking no Frequent awakenings no       She admits that her sleep has improved on PAP therapy using nasal mask and heated tubing. Review of device download indicated:  Auto pressure: 5-15 cmH2O; Peak Avg Pressure: 8.1 cmH2O;  Mean Pressure: 6.3 cmH2O    Average % Night in Large Leak:  0.0  % Used Days >= 4 hours: 70. Therapy Apnea Index averaged over PAP use: 3.1 /hr which reflects improved sleep breathing condition. Altus Sleepiness Score: 6 and Modified F.O.S.Q. Score Total / 2: 17 which reflects improved sleep quality over therapy time. Allergies   Allergen Reactions    Ace Inhibitors Cough    Other Medication Unable to BJ's names, 1 for sinus,1 for bp and 1 for bladder pt states it is called \"sentura\"    Penicillins Rash    Sanctura [Trospium] Other (comments)    Tudorza Pressair [Aclidinium Bromide] Swelling    Vesicare [Solifenacin] Rash and Other (comments)       She has a current medication list which includes the following prescription(s): amlodipine, buspirone, liothyronine, indapamide, cyclobenzaprine, gabapentin, levothyroxine, and ibuprofen. .      She  has a past medical history of Chronic kidney disease, Depression, GERD (gastroesophageal reflux disease), Grave's disease, Hypertension, Hypothyroidism following radioiodine therapy, Liver disease, Morbid obesity ([address] Nausea & vomiting, Obstructive sleep apnea (adult) (pediatric) (1/8/2013), and Thyroid disease. Sleep Review of Systems: notable for Negative difficulty falling asleep; Positive awakenings at night; Negative early morning headaches; Negative memory problems; Negative concentration issues; Negative chest pain; Negative shortness of breath; Negative significant joint pain at night; Negative significant muscle pain at night; Negative rashes or itching; Negative heartburn; Negative significant mood issues; 0 afternoon naps per week    Objective:     Visit Vitals  /82 (BP 1 Location: Left arm, BP Patient Position: Sitting)   Pulse 65   Ht 5' 3.5\" (1.613 m)   Wt 260 lb (117.9 kg)   SpO2 97%   BMI 45.33 kg/m²          General:   Alert, oriented, not in acute distress   Eyes:  Anicteric Sclerae; no obvious strabismus   Nose:  No obvious nasal septum deviation    Oropharynx:   Mallampati score 3, thick tongue base, uvula seen, low-lying soft palate, narrow tonsilo-pharyngeal pilars   Neck:   Midline trachea   Chest/Lungs:  Symmetrical lung expansion, clear lung fields on auscultation    CVS:  Normal rate, regular rhythm,  no JVD   Extremities:  No obvious rashes, no edema    Neuro:  No focal deficits; No obvious tremor    Psych:  Normal affect,  normal countenance       Assessment:       ICD-10-CM ICD-9-CM    1. Obstructive sleep apnea (adult) (pediatric) G47.33 327.23 AMB SUPPLY ORDER   2. Essential hypertension I10 401.9    3. Thyroid disease E07.9 246.9    4. Adult BMI 45.0-49.9 kg/sq m ([facility]) Z68.42 V85.42        AHI = 26.2(7/2019). On APAP :  5-15 cmH2O. She is adherent with PAP therapy and PAP continues to benefit patient and remains necessary for control of her sleep apnea.      Plan:     Follow-up and Dispositions    · Return in about 1 year (around 2020). *  Continue on current pressures    * tech to review tube temp and humidity setting with patient and set to patient pref. Orders Placed This Encounter    AMB SUPPLY ORDER     Diagnosis: (G47.33) LUCINA (obstructive sleep apnea)  (primary encounter diagnosis)     Please correct patient spelling of last name - Correct Spelling is Ermgeneda Emiine. Device is showing Chance Dickens.  1970. Serial Number [id number]      PATRICIO Madera; NPI: [id number]    Electronically signed. Date:- 19     * Counseling was provided regarding the importance of regular PAP use with emphasis on ensuring sufficient total sleep time, proper sleep hygiene, and safe driving. * Re-enforced proper and regular cleaning for the device. * She was asked to contact our office for any problems regarding PAP therapy. 2. Hypertension -  continue on her current regimen, she will continue to monitor her BP and follow up with her PMD for reevaluation/adjustment of medications if warranted. I have reviewed the relationship between hypertension as it relates to sleep-disordered breathing. 3. Thyroid Disorder - continue on her current regimen. 4. Recommended a dedicated weight loss program through appropriate diet and exercise regimen as significant weight reduction has been shown to reduce severity of obstructive sleep apnea. We discussed walking more. Patient's phone number [phone] (home)  was reviewed and confirmed for accuracy. She gives permission for messages regarding results and appointments to be left at that number. PATRICIO Madera, [address] [place] [address]  Electronically signed.  19

## 2020-01-14 ENCOUNTER — APPOINTMENT (OUTPATIENT)
Dept: GENERAL RADIOLOGY | Age: 50
End: 2020-01-14
Attending: PHYSICIAN ASSISTANT
Payer: MEDICAID

## 2020-01-14 ENCOUNTER — HOSPITAL ENCOUNTER (EMERGENCY)
Age: 50
Discharge: HOME | End: 2020-01-14
Attending: EMERGENCY MEDICINE
Payer: MEDICAID

## 2020-01-14 VITALS
RESPIRATION RATE: 16 BRPM | BODY MASS INDEX: 44.39 KG/M2 | DIASTOLIC BLOOD PRESSURE: 109 MMHG | WEIGHT: 260 LBS | OXYGEN SATURATION: 98 % | HEIGHT: 64 IN | HEART RATE: 69 BPM | SYSTOLIC BLOOD PRESSURE: 188 MMHG | TEMPERATURE: 98.3 F
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
## 2020-01-14 DIAGNOSIS — S93.401A SPRAIN OF RIGHT ANKLE, UNSPECIFIED LIGAMENT, INITIAL ENCOUNTER: Primary | ICD-10-CM

## 2020-01-14 DIAGNOSIS — R03.0 ELEVATED BLOOD PRESSURE READING: ICD-10-CM

## 2020-01-14 PROCEDURE — 74011250637 HC RX REV CODE- 250/637: Performed by: PHYSICIAN ASSISTANT

## 2020-01-14 PROCEDURE — 99284 EMERGENCY DEPT VISIT MOD MDM: CPT

## 2020-01-14 PROCEDURE — L1930 AFO PLASTIC: HCPCS

## 2020-01-14 PROCEDURE — 73610 X-RAY EXAM OF ANKLE: CPT

## 2020-01-14 RX ORDER — HYDROCODONE BITARTRATE AND ACETAMINOPHEN 5; 325 MG/1; MG/1
TABLET ORAL
Status: DISCONTINUED
Start: 2020-01-14 | End: 2020-01-14 | Stop reason: HOSPADM

## 2020-01-14 RX ORDER — HYDROCODONE BITARTRATE AND ACETAMINOPHEN 5; 325 MG/1; MG/1
1 TABLET ORAL
Status: COMPLETED | OUTPATIENT
Start: 2020-01-14 | End: 2020-01-14

## 2020-01-14 RX ORDER — TRAMADOL HYDROCHLORIDE 50 MG/1
50 TABLET ORAL
Qty: 20 TAB | Refills: 0 | Status: SHIPPED | OUTPATIENT
Start: 2020-01-14 | End: 2020-01-19

## 2020-01-14 RX ADMIN — HYDROCODONE BITARTRATE AND ACETAMINOPHEN 1 TABLET: 5; 325 TABLET ORAL at 12:33

## 2020-01-14 NOTE — ED PROVIDER NOTES
EMERGENCY DEPARTMENT HISTORY AND PHYSICAL EXAM      Date: 1/14/2020  Patient Name: Carmine Cates    History of Presenting Illness     Chief Complaint   Patient presents with    Ankle Pain     pt turned her ankle at home. complaint of rt ankle pain       History Provided By: Patient    HPI: Carmine Cates, 47 y.o. female with PMHx significant for HTN, presents by POV to the ED with cc of right ankle pain status post GLF that occurred this morning. She states she was walking in her home when her ankle gave way She had immediate onset of severe pain that has been constant since falling. The pain is non-radiating and worsens with movement. She is unable to ambulate. There are no other complaints, changes, or physical findings at this time. Social Hx: Tobacco (occasional smoker), EtOH (social), Illicit drug use (denies)     PCP: Kati Sibley MD    No current facility-administered medications on file prior to encounter. Current Outpatient Medications on File Prior to Encounter   Medication Sig Dispense Refill    ibuprofen (MOTRIN) 800 mg tablet TAKE 1 TAB BY MOUTH EVERY 8 HOURS AS NEEDED WITH FOOD 60 Tab 1    amLODIPine (NORVASC) 5 mg tablet Take 1 Tab by mouth daily. 60 Tab 2    busPIRone (BUSPAR) 7.5 mg tablet TAKE 1 TABLET BY MOUTH THREE TIMES A DAY 90 Tab 1    liothyronine (CYTOMEL) 25 mcg tablet Take 0.5 Tabs by mouth daily. 15 Tab 3    indapamide (LOZOL) 2.5 mg tablet TAKE 2 TABLETS BY MOUTH EVERY  Tab 2    cyclobenzaprine (FLEXERIL) 10 mg tablet Take 1 Tab by mouth three (3) times daily as needed for Muscle Spasm(s). 30 Tab 1    gabapentin (NEURONTIN) 100 mg capsule Take 3 Caps by mouth three (3) times daily. Max Daily Amount: 900 mg. 90 Cap 3    levothyroxine (SYNTHROID) 300 mcg tablet Take 1 Tab by mouth Daily (before breakfast).  61 Tab 2       Past History     Past Medical History:  Past Medical History:   Diagnosis Date    Chronic kidney disease     kidney failure-left    Depression     GERD (gastroesophageal reflux disease)     Grave's disease     Hypertension     Hypothyroidism following radioiodine therapy     2009    Liver disease     Morbid obesity ([address]     Nausea & vomiting     Obstructive sleep apnea (adult) (pediatric) 1/8/2013    Thyroid disease        Past Surgical History:  Past Surgical History:   Procedure Laterality Date    COLONOSCOPY N/A 7/12/2019    COLONOSCOPY performed by Jackie Hernandez MD at [facility] ENDOSCOPY    HX CHOLECYSTECTOMY      HX GYN  2005    novasure    HX ORTHOPAEDIC      Left arm    HX UROLOGICAL      bladder sling       Family History:  Family History   Problem Relation Age of Onset   [facility] Hypertension Mother     Hypertension Father     Other Father     Heart Attack Father     Diabetes Sister     Hypertension Brother        Social History:  Social History     Tobacco Use    Smoking status: Current Some Day Smoker     Packs/day: 0.25    Smokeless tobacco: Never Used   Substance Use Topics    Alcohol use: No     Comment: rare, 1 drink a month    Drug use: No       Allergies: Allergies   Allergen Reactions    Ace Inhibitors Cough    Other Medication Unable to [employer]J's names, 1 for sinus,1 for bp and 1 for bladder pt states it is called \"sentura\"    Penicillins Rash    Sanctura [Trospium] Other (comments)    Tudorza Pressair [Aclidinium Bromide] Swelling    Vesicare [Solifenacin] Rash and Other (comments)         Review of Systems   Review of Systems   Constitutional: Negative for chills, diaphoresis and fever. HENT: Negative for congestion, ear pain, rhinorrhea and sore throat. Respiratory: Negative for cough and shortness of breath. Cardiovascular: Negative for chest pain. Gastrointestinal: Negative for abdominal pain, constipation, diarrhea, nausea and vomiting. Genitourinary: Negative for difficulty urinating, dysuria, frequency and hematuria.    Musculoskeletal: Positive for arthralgias and gait problem. Negative for myalgias. Neurological: Negative for headaches. All other systems reviewed and are negative. Physical Exam   Physical Exam  Vitals signs and nursing note reviewed. Constitutional:       General: She is not in acute distress. Appearance: She is well-developed. She is obese. She is not diaphoretic. Comments: 47 y.o. -American female    HENT:      Head: Normocephalic and atraumatic. Eyes:      General:         Right eye: No discharge. Left eye: No discharge. Conjunctiva/sclera: Conjunctivae normal.   Neck:      Musculoskeletal: Normal range of motion and neck supple. Cardiovascular:      Rate and Rhythm: Normal rate and regular rhythm. Pulses: Normal pulses. Pulmonary:      Effort: Pulmonary effort is normal.   Musculoskeletal:      Comments: R ANKLE: + swelling. No ecchymosis or deformity. Diffuse TTP. ROM limited with pain. FROM of the toes. Distal NV intact. Cap refill < 2 sec. Skin:     General: Skin is warm and dry. Neurological:      Mental Status: She is alert and oriented to person, place, and time. Psychiatric:         Behavior: Behavior normal.         Diagnostic Study Results     Labs - None    Radiologic Studies -   XR ANKLE RT MIN 3 V   Final Result   IMPRESSION: No fracture. Medical Decision Making   I am the first provider for this patient. I reviewed the vital signs, available nursing notes, past medical history, past surgical history, family history and social history. Vital Signs-Reviewed the patient's vital signs. Patient Vitals for the past 12 hrs:   Temp Pulse Resp BP SpO2   01/14/20 1226 98.3 °F (36.8 °C) 70 16 (!) 212/92 100 %       Records Reviewed: Nursing Notes    Provider Notes (Medical Decision Making):   Fracture, sprain, strain, contusion,     ED Course:   Initial assessment performed.  The patients presenting problems have been discussed, and they are in agreement with the care plan formulated and outlined with them. I have encouraged them to ask questions as they arise throughout their visit. Critical Care Time: None    Disposition:  DISCHARGE NOTE:  12:59 PM  The pt is ready for discharge. The pt's signs, symptoms, diagnosis, and discharge instructions have been discussed and pt has conveyed their understanding. The pt is to follow up as recommended or return to ER should their symptoms worsen. Plan has been discussed and pt is in agreement. PLAN:  1. Current Discharge Medication List      START taking these medications    Details   traMADol (ULTRAM) 50 mg tablet Take 1 Tab by mouth every six (6) hours as needed for Pain for up to 5 days. Max Daily Amount: 200 mg. Qty: 20 Tab, Refills: 0    Associated Diagnoses: Sprain of right ankle, unspecified ligament, initial encounter         CONTINUE these medications which have NOT CHANGED    Details   ibuprofen (MOTRIN) 800 mg tablet TAKE 1 TAB BY MOUTH EVERY 8 HOURS AS NEEDED WITH FOOD  Qty: 60 Tab, Refills: 1    Associated Diagnoses: Generalized OA      amLODIPine (NORVASC) 5 mg tablet Take 1 Tab by mouth daily. Qty: 60 Tab, Refills: 2    Associated Diagnoses: Essential hypertension      busPIRone (BUSPAR) 7.5 mg tablet TAKE 1 TABLET BY MOUTH THREE TIMES A DAY  Qty: 90 Tab, Refills: 1    Associated Diagnoses: Anxiety      liothyronine (CYTOMEL) 25 mcg tablet Take 0.5 Tabs by mouth daily. Qty: 15 Tab, Refills: 3      indapamide (LOZOL) 2.5 mg tablet TAKE 2 TABLETS BY MOUTH EVERY DAY  Qty: 180 Tab, Refills: 2    Associated Diagnoses: Hypertension, renal disease      cyclobenzaprine (FLEXERIL) 10 mg tablet Take 1 Tab by mouth three (3) times daily as needed for Muscle Spasm(s). Qty: 30 Tab, Refills: 1    Associated Diagnoses: Lumbar radiculopathy      gabapentin (NEURONTIN) 100 mg capsule Take 3 Caps by mouth three (3) times daily. Max Daily Amount: 900 mg.   Qty: 90 Cap, Refills: 3    Associated Diagnoses: Neuropathy levothyroxine (SYNTHROID) 300 mcg tablet Take 1 Tab by mouth Daily (before breakfast). Qty: 60 Tab, Refills: 2    Associated Diagnoses: Hypothyroidism following radioiodine therapy           2. Follow-up Information     Follow up With Specialties Details Why Contact Valentina Aldana MD Orthopedic Surgery In 1 week As needed [address] [facility]  [address] [facility] [address]  [facility]  [phone]        3. Rest, ice, and elevate  4. Use the Ace wrap, ankle brace and crutches as discussed. Return to ED if worse     Diagnosis     Clinical Impression:   1. Sprain of right ankle, unspecified ligament, initial encounter    2. Fall, initial encounter    3. Elevated blood pressure reading          Please note that this dictation was completed with [employer], the computer voice recognition software. Quite often unanticipated grammatical, syntax, homophones, and other interpretive errors are inadvertently transcribed by the computer software. Please disregards these errors. Please excuse any errors that have escaped final proofreading. This note will not be viewable in [address]

## 2020-01-14 NOTE — ED NOTES
Jose Alfredo Pena PA-C reviewed discharge instructions with the patient. The patient verbalized understanding. All questions and concerns were addressed. The patient declined a wheelchair and is discharged ambulatory in the care of family members with instructions and prescriptions in hand. Pt is alert and oriented x 4. Respirations are clear and unlabored.

## 2020-01-14 NOTE — DISCHARGE INSTRUCTIONS
Patient Education        Ankle Sprain: Care Instructions  Your Care Instructions    An ankle sprain can happen when you twist your ankle. The ligaments that support the ankle can get stretched and torn. Often the ankle is swollen and painful. Ankle sprains may take from several weeks to several months to heal. Usually, the more pain and swelling you have, the more severe your ankle sprain is and the longer it will take to heal. You can heal faster and regain strength in your ankle with good home treatment. It is very important to give your ankle time to heal completely, so that you do not easily hurt your ankle again. Follow-up care is a key part of your treatment and safety. Be sure to make and go to all appointments, and call your doctor if you are having problems. It's also a good idea to know your test results and keep a list of the medicines you take. How can you care for yourself at home? · Prop up your foot on pillows as much as possible for the next 3 days. Try to keep your ankle above the level of your heart. This will help reduce the swelling. · Follow your doctor's directions for wearing a splint or elastic bandage. Wrapping the ankle may help reduce or prevent swelling. · Your doctor may give you a splint, a brace, an air stirrup, or another form of ankle support to protect your ankle until it is healed. Wear it as directed while your ankle is healing. Do not remove it unless your doctor tells you to. After your ankle has healed, ask your doctor whether you should wear the brace when you exercise. · Put ice or cold packs on your injured ankle for 10 to 20 minutes at a time. Try to do this every 1 to 2 hours for the next 3 days (when you are awake) or until the swelling goes down. Put a thin cloth between the ice and your skin. · You may need to use crutches until you can walk without pain. If you do use crutches, try to bear some weight on your injured ankle if you can do so without pain.  This helps the ankle heal.  · Take pain medicines exactly as directed. ? If the doctor gave you a prescription medicine for pain, take it as prescribed. ? If you are not taking a prescription pain medicine, ask your doctor if you can take an over-the-counter medicine. · If you have been given ankle exercises to do at home, do them exactly as instructed. These can promote healing and help prevent lasting weakness. When should you call for help? Call your doctor now or seek immediate medical care if:    · Your pain is getting worse.     · Your swelling is getting worse.     · Your splint feels too tight or you are unable to loosen it.    Watch closely for changes in your health, and be sure to contact your doctor if:    · You are not getting better after 1 week. Where can you learn more? Go to http://sara-annabelle.info/. Enter V271 in the search box to learn more about \"Ankle Sprain: Care Instructions. \"  Current as of: June 26, 2019  Content Version: 12.2  © 1474-3418 [employer]. Care instructions adapted under license by [employer] (which disclaims liability or warranty for this information). If you have questions about a medical condition or this instruction, always ask your healthcare professional. [place] [address] any warranty or liability for your use of this information. Patient Education        Elevated Blood Pressure: Care Instructions  Your Care Instructions    Blood pressure is a measure of how hard the blood pushes against the walls of your arteries. It's normal for blood pressure to go up and down throughout the day. But if it stays up over time, you have high blood pressure. Two numbers tell you your blood pressure. The first number is the systolic pressure. It shows how hard the blood pushes when your heart is pumping. The second number is the diastolic pressure.  It shows how hard the blood pushes between heartbeats, when your heart is relaxed and filling with blood. An ideal blood pressure in adults is less than 120/80 (say \"120 over 80\"). High blood pressure is 140/90 or higher. You have high blood pressure if your top number is 140 or higher or your bottom number is 90 or higher, or both. The main test for high blood pressure is simple, fast, and painless. To diagnose high blood pressure, your doctor will test your blood pressure at different times. After testing your blood pressure, your doctor may ask you to test it again when you are home. If you are diagnosed with high blood pressure, you can work with your doctor to make a long-term plan to manage it. Follow-up care is a key part of your treatment and safety. Be sure to make and go to all appointments, and call your doctor if you are having problems. It's also a good idea to know your test results and keep a list of the medicines you take. How can you care for yourself at home? · Do not smoke. Smoking increases your risk for heart attack and stroke. If you need help quitting, talk to your doctor about stop-smoking programs and medicines. These can increase your chances of quitting for good. · Stay at a healthy weight. · Try to limit how much sodium you eat to less than 2,300 milligrams (mg) a day. Your doctor may ask you to try to eat less than 1,500 mg a day. · Be physically active. Get at least 30 minutes of exercise on most days of the week. Walking is a good choice. You also may want to do other activities, such as running, swimming, cycling, or playing tennis or team sports. · Avoid or limit alcohol. Talk to your doctor about whether you can drink any alcohol. · Eat plenty of fruits, vegetables, and low-fat dairy products. Eat less saturated and total fats. · Learn how to check your blood pressure at home. When should you call for help?   Call your doctor now or seek immediate medical care if:  ? · Your blood pressure is much higher than normal (such as 180/110 or higher). ? · You think high blood pressure is causing symptoms such as:  ¨ Severe headache. ¨ Blurry vision. ? Watch closely for changes in your health, and be sure to contact your doctor if:  ? · You do not get better as expected. Where can you learn more? Go to http://sara-annabelle.info/. Enter E560 in the search box to learn more about \"Elevated Blood Pressure: Care Instructions. \"  Current as of: September 21, 2016  Content Version: 11.4  © 5873-9226 [employer]. Care instructions adapted under license by [employer] (which disclaims liability or warranty for this information). If you have questions about a medical condition or this instruction, always ask your healthcare professional. [address] any warranty or liability for your use of this information.

## 2020-01-14 NOTE — LETTER
[facility] EMERGENCY DEPT 
[address] [address] 
[phone] Work/School Note Date: 1/14/2020 To Whom It May concern: 
 
Odette Chaudhari was seen and treated today in the emergency room by the following provider(s): 
Attending Provider: Marcie Miller MD 
Physician Assistant: Joann Rust. Please excuse Lissette Singh from work today. He/She was in the ER with the above patient. Sincerely, Joann Thomas

## 2020-01-29 ENCOUNTER — OFFICE VISIT (OUTPATIENT)
Dept: INTERNAL MEDICINE CLINIC | Age: 50
End: 2020-01-29

## 2020-01-29 VITALS
DIASTOLIC BLOOD PRESSURE: 68 MMHG | BODY MASS INDEX: 45.58 KG/M2 | RESPIRATION RATE: 18 BRPM | OXYGEN SATURATION: 98 % | SYSTOLIC BLOOD PRESSURE: 133 MMHG | WEIGHT: 267 LBS | HEIGHT: 64 IN | TEMPERATURE: 98.6 F | HEART RATE: 78 BPM

## 2020-01-29 DIAGNOSIS — G47.33 OSA (OBSTRUCTIVE SLEEP APNEA): ICD-10-CM

## 2020-01-29 DIAGNOSIS — N18.30 CHRONIC RENAL DISEASE, STAGE III (HCC): ICD-10-CM

## 2020-01-29 DIAGNOSIS — E66.01 MORBID OBESITY (HCC): ICD-10-CM

## 2020-01-29 DIAGNOSIS — I12.9 HYPERTENSION, RENAL DISEASE: Primary | ICD-10-CM

## 2020-01-29 NOTE — PROGRESS NOTES
Norman Peñaloza is a 47 y.o. female and presents with Blood Pressure Check (on amlodipine AND indapamide)  . Subjective:      PMH-HTN-on indapamide 5mg ONLY     BP Readings from Last 3 Encounters:   01/29/20 133/68   01/14/20 (!) 188/109   12/30/19 127/82        Hypothyroidism-pt is currently on 300mcg levothyroxine and 25mcg of iothyronine. Pt relays she takes in the AM, fasting    -pt has is followed by endocrinology    Lab Results   Component Value Date/Time    TSH 2.150 11/06/2019 03:21 PM    T3 Uptake 34 11/06/2019 03:21 PM    T4, Free 2.62 (H) 11/06/2019 03:21 PM    T4, Total 17.1 (H) 11/06/2019 03:21 PM        LUCINA-pt uses CPAP     Vit d def-  Lab Results   Component Value Date/Time    VITAMIN D, 25-HYDROXY 21.5 (L) 04/19/2019 11:56 AM        Cigarette smoker      Anxiety-MUCH improved on buspar     Morbid obesity-  Wt Readings from Last 3 Encounters:   01/29/20 267 lb (121.1 kg)   01/14/20 260 lb (117.9 kg)   12/30/19 260 lb (117.9 kg)      Chronic lbp w radiculopathy-pt reports that gabapentin 300mg TID relieves her back pain, but not the neuropathy    -pt declines maxing dose of gabapentin due to SE      Review of Systems  Constitutional: negative for fevers, chills, anorexia and weight loss  Eyes:   negative for visual disturbance and irritation  ENT:   negative for tinnitus,sore throat,nasal congestion,ear pains. hoarseness  Respiratory:  negative for cough, hemoptysis, dyspnea,wheezing  CV:   negative for chest pain, palpitations, lower extremity edema  GI:   negative for nausea, vomiting, diarrhea, abdominal pain,melena  Neurological:  negative for headaches, dizziness, vertigo, memory problems and gait     Past Medical History:   Diagnosis Date    Chronic kidney disease     kidney failure-left    Depression     GERD (gastroesophageal reflux disease)     Grave's disease     Hypertension     Hypothyroidism following radioiodine therapy     2009    Liver disease     Morbid obesity ([facility] [address]     Nausea & vomiting     Obstructive sleep apnea (adult) (pediatric) 1/8/2013    Thyroid disease      Past Surgical History:   Procedure Laterality Date    COLONOSCOPY N/A 7/12/2019    COL[facility]COPY performed by [facility] at [facility] ENDOSCOPY    HX CHOLECYSTECTOMY      HX GYN  2005    novasure    HX ORTHOPAEDIC      Left arm    HX UROLOGICAL      bladder sling     Social History     Socioeconomic History    Marital status:      Spouse name: Not on file    Number of children: Not on file    Years of education: Not on file    Highest education level: Not on file   Tobacco Use    Smoking status: Current Some Day Smoker     Packs/day: 0.25    Smokeless tobacco: Never Used   Substance and Sexual Activity    Alcohol use: No     Comment: rare, 1 drink a month    Drug use: No    Sexual activity: Not Currently     Family History   Problem Relation Age of Onset    Hypertension Mother     Hypertension Father     Other Father     Heart Attack Father     Diabetes Sister     Hypertension Brother      Current Outpatient Medications   Medication Sig Dispense Refill    ibuprofen (MOTRIN) 800 mg tablet TAKE 1 TAB BY MOUTH EVERY 8 HOURS AS NEEDED WITH FOOD 60 Tab 1    amLODIPine (NORVASC) 5 mg tablet Take 1 Tab by mouth daily. 60 Tab 2    busPIRone (BUSPAR) 7.5 mg tablet TAKE 1 TABLET BY MOUTH THREE TIMES A DAY 90 Tab 1    liothyronine (CYTOMEL) 25 mcg tablet Take 0.5 Tabs by mouth daily. 15 Tab 3    indapamide (LOZOL) 2.5 mg tablet TAKE 2 TABLETS BY MOUTH EVERY  Tab 2    cyclobenzaprine (FLEXERIL) 10 mg tablet Take 1 Tab by mouth three (3) times daily as needed for Muscle Spasm(s). 30 Tab 1    gabapentin (NEURONTIN) 100 mg capsule Take 3 Caps by mouth three (3) times daily. Max Daily Amount: 900 mg. 90 Cap 3    levothyroxine (SYNTHROID) 300 mcg tablet Take 1 Tab by mouth Daily (before breakfast).  60 Tab 2     Allergies   Allergen Reactions    Ace Inhibitors Cough    Other Medication Unable to [employer] names, 1 for sinus,1 for bp and 1 for bladder pt states it is called \"sentura\"    Penicillins Rash    Sanctura [Trospium] Other (comments)    Tudorza Pressair [Aclidinium Bromide] Swelling    Vesicare [Solifenacin] Rash and Other (comments)       Objective:  Visit Vitals  /68 (BP 1 Location: Left arm, BP Patient Position: Sitting)   Pulse 78   Temp 98.6 °F (37 °C) (Oral)   Resp 18   Ht 5' 4\" (1.626 m)   Wt 267 lb (121.1 kg)   SpO2 98%   BMI 45.83 kg/m²     Physical Exam:   General appearance - alert, obese, in NAD  Mental status - alert, oriented to person, place, and time  EYE-MAX, EOMI, corneas normal, no foreign bodies  Neck - supple, no significant adenopathy   Chest - clear to auscultation, no wheezes, rales or rhonchi, symmetric air entry   Heart - normal rate, regular rhythm, normal S1, S2, 2/6 systolic murmur  Abdomen - soft, nontender, obese +bs  Ext-unable to perform straight leg due to patients discomfort  Skin-Warm and dry. no hyperpigmentation, vitiligo, or suspicious lesions  Neuro -alert, oriented, normal speech, no focal findings or movement disorder noted      Results for orders placed or performed in visit on 11/14/19   PAP IG, HPV AND RFX HPV 61/44,18([id number])   Result Value Ref Range    Diagnosis Comment     Specimen adequacy Comment     Clinician provided ICD10 Comment     Performed by: Comment     . Vince Jeffries Note: Comment     Test methodology Comment     HPV DNA Probe, High Risk Negative Negative       Assessment/Plan:    ICD-10-CM ICD-9-CM    1. Hypertension, renal disease I12.9 403.90      585.9    2. Morbid obesity ([address] E66.01 278.01    3. LUCINA (obstructive sleep apnea) G47.33 327.23    4. Chronic renal disease, stage III (HCC) N18.3 585.3      No orders of the defined types were placed in this encounter. 1. Hypertension, renal disease  Controlled on current regimen    2.  Morbid obesity ([address]  D/w pt portion sizes, low carb diet and healthy diet    3. LUCINA (obstructive sleep apnea)  Cont CPAP    4. Chronic renal disease, stage III (HCC)  Stable            There are no Patient Instructions on file for this visit. I have reviewed with the patient details of the assessment and plan and all questions were answered. Relevent patient education was performed. The most recent lab findings were reviewed with the patient. An After Visit Summary was printed and given to the patient.

## 2020-01-29 NOTE — PROGRESS NOTES
Chief Complaint   Patient presents with    Blood Pressure Check     on amlodipine AND indapamide     1. Have you been to the ER, urgent care clinic since your last visit? Hospitalized since your last visit? Yes When: [facility] 1/14/2020 for falll. 2. Have you seen or consulted any other health care providers outside of the [address] [place] [address] since your last visit? Include any pap smears or colon screening.  No

## 2020-02-12 ENCOUNTER — OFFICE VISIT (OUTPATIENT)
Dept: ENDOCRINOLOGY | Age: 50
End: 2020-02-12

## 2020-02-12 VITALS
SYSTOLIC BLOOD PRESSURE: 136 MMHG | WEIGHT: 268.2 LBS | BODY MASS INDEX: 45.79 KG/M2 | HEIGHT: 64 IN | HEART RATE: 87 BPM | DIASTOLIC BLOOD PRESSURE: 82 MMHG

## 2020-02-12 DIAGNOSIS — E89.0 HYPOTHYROIDISM FOLLOWING RADIOIODINE THERAPY: Primary | ICD-10-CM

## 2020-02-12 NOTE — PROGRESS NOTES
Chief Complaint   Patient presents with    Thyroid Problem     pcp and pharmacy verified     History of Present Illness: Viry Simmons is a 47 y.o. female here for follow up of hypothyroidism. Pt was previously seen by Dr. Gracie Jenkins, but has not been seen since February 2015. At that time she was taking LT4 300mcg daily and LT3 25mcg daily. She has hx of Grave's Hyperthyroidism, which was treated with PALAFOX and she devloped subsequent hypothyroidism. At our initial visit in November 2019 she was clinically and biochemically euthyroid on LT4 350mcg daily, but I changed her to LT4 300mcg and LT2 12.5mcg daily. Pt had a fall and \"my ankle popped and I hit the floor. They said all I did was sprain it, but it is still bothering me\". Pt did not have any fractures. Pt notes that she surgery for the \"hole in my retina\" went well. She had no complications or problems. Pt is still taking the LT4 300mcg daily and LT3 12.5mcg daily. Pt notes she continues to have issues of panic attacks. She will have two episodes per day. She denies issues of CP, SOB, tremors, diarrhea, constipation, heat or cold intolerance. She denies issues of dysphagia or dysphonia    Current Outpatient Medications   Medication Sig    ibuprofen (MOTRIN) 800 mg tablet TAKE 1 TAB BY MOUTH EVERY 8 HOURS AS NEEDED WITH FOOD    amLODIPine (NORVASC) 5 mg tablet Take 1 Tab by mouth daily.  busPIRone (BUSPAR) 7.5 mg tablet TAKE 1 TABLET BY MOUTH THREE TIMES A DAY    liothyronine (CYTOMEL) 25 mcg tablet Take 0.5 Tabs by mouth daily.  indapamide (LOZOL) 2.5 mg tablet TAKE 2 TABLETS BY MOUTH EVERY DAY    cyclobenzaprine (FLEXERIL) 10 mg tablet Take 1 Tab by mouth three (3) times daily as needed for Muscle Spasm(s).  gabapentin (NEURONTIN) 100 mg capsule Take 3 Caps by mouth three (3) times daily. Max Daily Amount: 900 mg.  levothyroxine (SYNTHROID) 300 mcg tablet Take 1 Tab by mouth Daily (before breakfast).      No current facility-administered medications for this visit. Allergies   Allergen Reactions    Ace Inhibitors Cough    Other Medication Unable to [employer] names, 1 for sinus,1 for bp and 1 for bladder pt states it is called \"sentura\"    Penicillins Rash    Sanctura [Trospium] Other (comments)    Tudorza Pressair [Aclidinium Bromide] Swelling    Vesicare [Solifenacin] Rash and Other (comments)     Review of Systems:  - Cardiovascular: no chest pain  - Neurological: no tremors  - Integumentary: skin is normal    Physical Examination:  Blood pressure 136/82, pulse 87, height 5' 4\" (1.626 m), weight 268 lb 3.2 oz (121.7 kg). - General: pleasant, no distress, good eye contact   - Neck: no thyromegaly or thyroid bruits  - Cardiovascular: regular, normal rate, nl s1 and s2, no m/r/g   - Integumentary: skin is normal, no edema  - Neurological: reflexes 2+ at biceps, no tremors  - Psychiatric: normal mood and affect    Data Reviewed:   - none new for review    Assessment/Plan:   1) Hypothyroidism > Pt is clinically euthyroid on LT4 300mcg daily and LT3 12.5mcg daily. Will check TFTs and adjust her dose as needed. Pt will need refills once her labs are back. Pt voices understanding and agreement with the plan. Pain noted and pt was recommended to call her PCP for further evaluation and treatment, as needed    RTC 6 months      Follow-up and Dispositions    · Return in about 6 months (around 8/12/2020).          Copy sent to:  Dr. Irwin Marie

## 2020-08-05 ENCOUNTER — VIRTUAL VISIT (OUTPATIENT)
Dept: ENDOCRINOLOGY | Age: 50
End: 2020-08-05
Payer: MEDICAID

## 2020-08-05 DIAGNOSIS — E89.0 HYPOTHYROIDISM FOLLOWING RADIOIODINE THERAPY: Primary | ICD-10-CM

## 2020-08-05 DIAGNOSIS — R63.1 POLYDIPSIA: ICD-10-CM

## 2020-08-05 PROCEDURE — 99214 OFFICE O/P EST MOD 30 MIN: CPT | Performed by: INTERNAL MEDICINE

## 2020-08-05 RX ORDER — LIOTHYRONINE SODIUM 25 UG/1
12.5 TABLET ORAL DAILY
Qty: 15 TAB | Refills: 3 | Status: SHIPPED | OUTPATIENT
Start: 2020-08-05 | End: 2021-03-03

## 2020-08-05 RX ORDER — LEVOTHYROXINE SODIUM 300 UG/1
300 TABLET ORAL
Qty: 30 TAB | Refills: 2 | Status: SHIPPED | OUTPATIENT
Start: 2020-08-05 | End: 2021-03-03

## 2020-08-05 NOTE — PROGRESS NOTES
Chief Complaint   Patient presents with    Thyroid Problem     Doxy: [email or web address]    Medication Refill     Pharmacy: [facility]            **THIS IS A VIRTUAL VISIT VIA A VIDEO ENCOUNTER. PATIENT AGREED TO HAVE THEIR CARE DELIVERED OVER VIDEO IN PLACE OF THEIR REGULARLY SCHEDULED OFFICE VISIT**        History of Present Illness: Alex Mart is a 45 y.o. female here for follow up of hypothyroidism secondary to PALAFOX. At our last visit in February 2020 she was taking LT4 300mcg daily and LT3 12.5mcg daily. Pt had recently had surgery for the [facility] in my retina\" went well. She had no complications or problems. She was clinically euthyroid so I sent her for TFTs which she did not have drawn. Pt reports that \"overall I have been doing ok\". She notes that her only complaint is the \"sciatic nerve issues\". She denies any recent illnesses, injuries or hospitalizations. Pt notes she ran out of her LT4 300mcg and the LT3 12.5mcg about 2-3 weeks ago. Pt notes she continues to have issues of panic attacks, but she notes they are down to about 1-2 per month. She denies issues of CP, SOB, tremors, diarrhea, constipation, heat or cold intolerance. She denies issues of dysphagia or dysphonia. Pt notes that she has cut out beef and pork, she notes that the beef has been giving her a HA. She also notes that she is working on weight loss and she reports that \"I am thirsty all the time. \"  She notes the increased thirst started in May 2020, when the heat started increasing. \"I feel like I want to drink water all the time\". She denies dry mouth issues. Pt is postmenopausal so she does get hot flashes so she is not noticing any changes in how much she is sweating or any changes in her body temperature. Current Outpatient Medications   Medication Sig    indapamide (LOZOL) 2.5 mg tablet TAKE 2 TABLETS BY MOUTH EVERY DAY    amLODIPine (NORVASC) 5 mg tablet Take 1 Tab by mouth daily.     ibuprofen (MOTRIN) 800 mg tablet TAKE 1 TAB BY MOUTH EVERY 8 HOURS AS NEEDED WITH FOOD    busPIRone (BUSPAR) 7.5 mg tablet TAKE 1 TABLET BY MOUTH THREE TIMES A DAY    liothyronine (CYTOMEL) 25 mcg tablet Take 0.5 Tabs by mouth daily.  cyclobenzaprine (FLEXERIL) 10 mg tablet Take 1 Tab by mouth three (3) times daily as needed for Muscle Spasm(s).  gabapentin (NEURONTIN) 100 mg capsule Take 3 Caps by mouth three (3) times daily. Max Daily Amount: 900 mg.  levothyroxine (SYNTHROID) 300 mcg tablet Take 1 Tab by mouth Daily (before breakfast). No current facility-administered medications for this visit. Allergies   Allergen Reactions    Ace Inhibitors Cough    Other Medication Unable to [employer] names, 1 for sinus,1 for bp and 1 for bladder pt states it is called \"sentura\"    Penicillins Rash    Sanctura [Trospium] Other (comments)    Tudorza Pressair [Aclidinium Bromide] Swelling    Vesicare [Solifenacin] Rash and Other (comments)     Review of Systems:  - Cardiovascular: no chest pain  - Neurological: no tremors  - Integumentary: skin is normal    Physical Examination:  There were no vitals taken for this visit. - GENERAL: NCAT, Appears well nourished   - EYES: EOMI, non-icteric, no proptosis   - Ear/Nose/Throat: NCAT, no visible inflammation or masses   - CARDIOVASCULAR: no cyanosis, no visible JVD   - RESPIRATORY: respiratory effort normal without any distress or labored breathing   - MUSCULOSKELETAL: Normal ROM of neck and upper extremities observed   - SKIN: No rash on face   - NEUROLOGIC:  No facial asymmetry (Cranial nerve 7 motor function), No gaze palsy   - PSYCHIATRIC: Normal affect, Normal insight and judgement   -     Data Reviewed:   - none new for review    Assessment/Plan:   1) Hypothyroidism > Will restart the LT4 300mcg daily and LT3 12.5mcg daily. Will check TFTs in 4 weeks and adjust her dose as needed.     2) Polydipsea (new) > Will order an A1C to rule out DM as the cause of her polydipsia    Pt voices understanding and agreement with the plan.   Pain noted and pt was recommended to call her PCP for further evaluation and treatment, as needed    RTC 3 months          Copy sent to:  Dr. Maritza Kwan

## 2020-08-12 DIAGNOSIS — E89.0 HYPOTHYROIDISM FOLLOWING RADIOIODINE THERAPY: ICD-10-CM

## 2020-08-17 ENCOUNTER — OFFICE VISIT (OUTPATIENT)
Dept: INTERNAL MEDICINE CLINIC | Age: 50
End: 2020-08-17
Payer: MEDICAID

## 2020-08-17 VITALS
TEMPERATURE: 98.4 F | DIASTOLIC BLOOD PRESSURE: 90 MMHG | RESPIRATION RATE: 19 BRPM | WEIGHT: 271 LBS | SYSTOLIC BLOOD PRESSURE: 142 MMHG | HEIGHT: 64 IN | OXYGEN SATURATION: 100 % | BODY MASS INDEX: 46.26 KG/M2 | HEART RATE: 66 BPM

## 2020-08-17 DIAGNOSIS — G47.33 OSA (OBSTRUCTIVE SLEEP APNEA): ICD-10-CM

## 2020-08-17 DIAGNOSIS — I12.9 HYPERTENSION, RENAL DISEASE: Primary | ICD-10-CM

## 2020-08-17 DIAGNOSIS — E89.0 HYPOTHYROIDISM FOLLOWING RADIOIODINE THERAPY: ICD-10-CM

## 2020-08-17 DIAGNOSIS — E66.01 MORBID OBESITY (HCC): ICD-10-CM

## 2020-08-17 DIAGNOSIS — M15.9 GENERALIZED OA: ICD-10-CM

## 2020-08-17 DIAGNOSIS — F41.9 ANXIETY DISORDER, UNSPECIFIED TYPE: ICD-10-CM

## 2020-08-17 DIAGNOSIS — N18.30 CHRONIC RENAL DISEASE, STAGE III (HCC): ICD-10-CM

## 2020-08-17 PROCEDURE — 99214 OFFICE O/P EST MOD 30 MIN: CPT | Performed by: INTERNAL MEDICINE

## 2020-08-17 NOTE — PROGRESS NOTES
Allison Wolf is a 45 y.o. female and presents with Hypertension  . Subjective:    Pt has no new concerns today. Pt is due for a mammo, but has not scheduled appt as yet. She has received the letter at home w scheduling info. PMH-HTN-on indapamide 5mg ONLY     BP Readings from Last 3 Encounters:   08/17/20 142/90   02/12/20 136/82   01/29/20 133/68        Hypothyroidism-pt is currently on 300mcg levothyroxine and 25mcg of iothyronine. Pt relays she takes in the AM, fasting    -pt  is followed by endocrinology    -endo ordered labs 8/5/2020, but she has not completed as yet    Lab Results   Component Value Date/Time    TSH 2.150 11/06/2019 03:21 PM    T3 Uptake 34 11/06/2019 03:21 PM    T4, Free 2.62 (H) 11/06/2019 03:21 PM    T4, Total 17.1 (H) 11/06/2019 03:21 PM        LUCNIA-pt uses CPAP     Vit d def-  Lab Results   Component Value Date/Time    VITAMIN D, 25-HYDROXY 21.5 (L) 04/19/2019 11:56 AM        Cigarette smoker- quit 2 mths ago!!!       Anxiety-MUCH improved on buspar     Morbid obesity-  Wt Readings from Last 3 Encounters:   08/17/20 271 lb (122.9 kg)   02/12/20 268 lb 3.2 oz (121.7 kg)   01/29/20 267 lb (121.1 kg)      Chronic lbp w radiculopathy-pt reports that gabapentin 300mg TID relieves her back pain, but not the neuropathy    -pt declines maxing dose of gabapentin due to SE     -pt declines pain management referral      CKD 3-  Lab Results   Component Value Date/Time    GFR est AA 60 03/08/2019 02:08 PM    GFR est non-AA 52 (L) 03/08/2019 02:08 PM    Creatinine 1.22 (H) 03/08/2019 02:08 PM    BUN 13 03/08/2019 02:08 PM    Sodium 140 03/08/2019 02:08 PM    Potassium 4.3 03/08/2019 02:08 PM    Chloride 99 03/08/2019 02:08 PM    CO2 27 03/08/2019 02:08 PM         Review of Systems  Constitutional: negative for fevers, chills, anorexia and weight loss  Eyes:   negative for visual disturbance and irritation  ENT:   negative for tinnitus,sore throat,nasal congestion,ear pains.hoarseness  Respiratory:  negative for cough, hemoptysis, dyspnea,wheezing  CV:   negative for chest pain, palpitations, lower extremity edema  GI:   negative for nausea, vomiting, diarrhea, abdominal pain,melena  Neurological:  negative for headaches, dizziness, vertigo, memory problems and gait     Past Medical History:   Diagnosis Date    Chronic kidney disease     kidney failure-left    Depression     GERD (gastroesophageal reflux disease)     Grave's disease     Hypertension     Hypothyroidism following radioiodine therapy     2009    Liver disease     Morbid obesity ([address]     Nausea & vomiting     Obstructive sleep apnea (adult) (pediatric) 1/8/2013    Thyroid disease      Past Surgical History:   Procedure Laterality Date    COLONOSCOPY N/A 7/12/2019    COLON[facility] Anum [facility] END[facility]COPY    HX CHOLECYSTECTOMY      HX GYN  2005    novasure    HX ORTHOPAEDIC      Left arm    HX UROLOGICAL      bladder sling     Social History     Socioeconomic History    Marital status:      Spouse name: Not on file    Number of children: Not on file    Years of education: Not on file    Highest education level: Not on file   Tobacco Use    Smoking status: Current Some Day Smoker     Packs/day: 0.25    Smokeless tobacco: Never Used   Substance and Sexual Activity    Alcohol use: Not Currently     Comment: rare, 1 drink a month    Drug use: No    Sexual activity: Not Currently     Family History   Problem Relation Age of Onset    Hypertension Mother    Aundra Black Emphysema Mother     Hypertension Father     Other Father     Heart Attack Father     Stroke Father     Osteoporosis Father     Diabetes Sister     Hypertension Brother      Current Outpatient Medications   Medication Sig Dispense Refill    liothyronine (Cytomel) 25 mcg tablet Take 0.5 Tabs by mouth daily.  15 Tab 3    levothyroxine (SYNTHROID) 300 mcg tablet Take 1 Tab by mouth Daily (before breakfast). 30 Tab 2    indapamide (LOZOL) 2.5 mg tablet TAKE 2 TABLETS BY MOUTH EVERY  Tab 2    amLODIPine (NORVASC) 5 mg tablet Take 1 Tab by mouth daily. 90 Tab 1    ibuprofen (MOTRIN) 800 mg tablet TAKE 1 TAB BY MOUTH EVERY 8 HOURS AS NEEDED WITH FOOD 60 Tab 1    busPIRone (BUSPAR) 7.5 mg tablet TAKE 1 TABLET BY MOUTH THREE TIMES A DAY 90 Tab 1    cyclobenzaprine (FLEXERIL) 10 mg tablet Take 1 Tab by mouth three (3) times daily as needed for Muscle Spasm(s). 30 Tab 1    gabapentin (NEURONTIN) 100 mg capsule Take 3 Caps by mouth three (3) times daily. Max Daily Amount: 900 mg. 90 Cap 3     Allergies   Allergen Reactions    Ace Inhibitors Cough    Other Medication Unable to Google, 1 for sinus,1 for bp and 1 for bladder pt states it is called \"sentura\"    Penicillins Rash    Sanctura [Trospium] Other (comments)    Tudorza Pressair [Aclidinium Bromide] Swelling    Vesicare [Solifenacin] Rash and Other (comments)       Objective:  Visit Vitals  /90 (BP 1 Location: Right arm, BP Patient Position: Sitting)   Pulse 66   Temp 98.4 °F (36.9 °C) (Oral)   Resp 19   Ht 5' 4\" (1.626 m)   Wt 271 lb (122.9 kg)   SpO2 100%   BMI 46.52 kg/m²     Physical Exam:   General appearance - alert, obese, in NAD  Mental status - alert, oriented to person, place, and time  EYE-MAX, EOMI, corneas normal, no foreign bodies  Neck - supple, no significant adenopathy   Chest - clear to auscultation, no wheezes, rales or rhonchi, symmetric air entry   Heart - normal rate, regular rhythm, normal S1, S2, 2/6 systolic murmur  Abdomen - soft, nontender, obese +bs  Ext-unable to perform straight leg due to patients discomfort  Skin-Warm and dry.  no hyperpigmentation, vitiligo, or suspicious lesions  Neuro -alert, oriented, normal speech, no focal findings or movement disorder noted      Results for orders placed or performed in visit on 11/14/19   PAP IG, HPV AND RFX HPV 23/10,39([id number])   Result Value Ref Range    Diagnosis Comment     Specimen adequacy Comment     Clinician provided ICD10 Comment     Performed by: Comment     . Yadi Rees Note: Comment     Test methodology Comment     HPV DNA Probe, High Risk Negative Negative       Assessment/Plan:    ICD-10-CM ICD-9-CM    1. Hypertension, renal disease  I12.9 403.90      585.9    2. Chronic renal disease, stage III (HCC)  N18.3 585.3    3. Hypothyroidism following radioiodine therapy  E89.0 244.1    4. Morbid obesity ([address]  E66.01 278.01    5. LUCINA (obstructive sleep apnea)  G47.33 327.23    6. Anxiety disorder, unspecified type  F41.9 300.00    7. Generalized OA  M15.9 715.00      No orders of the defined types were placed in this encounter. 1. Hypertension, renal disease  Relatively controlled on current mngmnt  D/w pt avoidance of excess sodium    2. Chronic renal disease, stage III (HCC)  Noted  D/w pt wt loss and its impact on renal fxn    3. Hypothyroidism following radioiodine therapy  Check labs    4. Morbid obesity ([address]  Noted    5. LUCINA (obstructive sleep apnea)  Cont CPAP    6. Anxiety disorder, unspecified type  Improved    7. Generalized OA  Noted          There are no Patient Instructions on file for this visit. Follow-up and Dispositions    · Return in about 4 months (around 12/17/2020). I have reviewed with the patient details of the assessment and plan and all questions were answered. Relevent patient education was performed. The most recent lab findings were reviewed with the patient. An After Visit Summary was printed and given to the patient.

## 2020-08-17 NOTE — PROGRESS NOTES
3/6/2018 Per [employer], Message was delivered on an answering machine   Chief Complaint   Patient presents with    Hypertension     1. Have you been to the ER, urgent care clinic since your last visit? Hospitalized since your last visit? No    2. Have you seen or consulted any other health care providers outside of the [address] [place] [address] since your last visit? Include any pap smears or colon screening.  No

## 2020-11-12 ENCOUNTER — VIRTUAL VISIT (OUTPATIENT)
Dept: ENDOCRINOLOGY | Age: 50
End: 2020-11-12
Payer: MEDICAID

## 2020-11-12 DIAGNOSIS — R63.1 POLYDIPSIA: ICD-10-CM

## 2020-11-12 DIAGNOSIS — E89.0 HYPOTHYROIDISM FOLLOWING RADIOIODINE THERAPY: Primary | ICD-10-CM

## 2020-11-12 PROCEDURE — 99214 OFFICE O/P EST MOD 30 MIN: CPT | Performed by: INTERNAL MEDICINE

## 2020-11-12 RX ORDER — OMEPRAZOLE 40 MG/1
40 CAPSULE, DELAYED RELEASE ORAL DAILY
COMMUNITY
End: 2022-02-15 | Stop reason: SDUPTHER

## 2020-11-12 NOTE — PROGRESS NOTES
Chief Complaint   Patient presents with    Thyroid Problem     PCP and Pharmacy verified    Other     Doxy: [email or web address]            **THIS IS A VIRTUAL VISIT VIA A VIDEO ENCOUNTER. PATIENT AGREED TO HAVE THEIR CARE DELIVERED OVER VIDEO IN PLACE OF THEIR REGULARLY SCHEDULED OFFICE VISIT**      History of Present Illness: Mayuri Aceves is a 45 y.o. female here for follow up of hypothyroidism secondary to PALAFOX. At our last visit in August 2020 she had run out of her LT4 300mcg and the LT3 12.5mcg for about a month. Pt notes she continues to have issues of panic attacks, but she notes they are down to about 1-2 per month. I reordered her LT4 and LT3 and instructed her to go have labs drawn in 4 weeks, but she did not have these labs drawn. She had also complained of increased thirst so I ordered an A1C, which she did not go have drawn. She denies any recent illnesses, injuries or hospitalizations. Pt reports that she has been taking herLT4 300mcg and the LT3 12.5mcg   She denies issues of CP, SOB, tremors, diarrhea, constipation, heat or cold intolerance. She denies issues of dysphagia or dysphonia. Pt continues to have panic attacks. Pt notes that she has cut out beef and pork, she notes that the beef has been giving her a HA. She also notes that she is working on weight loss. She has had some weight loss \"but I feel like I should be losing more. Since my brother became a diabetic, I have been eating like he should and I am cutting out all sugars\"    Pt reports that she is still having the issues of feeling thirsty all the time. She notes the increased thirst started in May 2020, when the heat started increasing. \"I feel like I want to drink water all the time\". She denies dry mouth issues. Pt is postmenopausal so she does get hot flashes so she is not noticing any changes in how much she is sweating or any changes in her body temperature.     Pt reports that her eye doctor told her that he felt that she was having issues of pressure in her eye and he thought it could be thyroid related. She is followed by Dr. Calixto Acosta. Current Outpatient Medications   Medication Sig    omeprazole (PRILOSEC) 40 mg capsule Take 40 mg by mouth daily.  liothyronine (Cytomel) 25 mcg tablet Take 0.5 Tabs by mouth daily.  levothyroxine (SYNTHROID) 300 mcg tablet Take 1 Tab by mouth Daily (before breakfast).  indapamide (LOZOL) 2.5 mg tablet TAKE 2 TABLETS BY MOUTH EVERY DAY    amLODIPine (NORVASC) 5 mg tablet Take 1 Tab by mouth daily.  ibuprofen (MOTRIN) 800 mg tablet TAKE 1 TAB BY MOUTH EVERY 8 HOURS AS NEEDED WITH FOOD    busPIRone (BUSPAR) 7.5 mg tablet TAKE 1 TABLET BY MOUTH THREE TIMES A DAY    cyclobenzaprine (FLEXERIL) 10 mg tablet Take 1 Tab by mouth three (3) times daily as needed for Muscle Spasm(s). No current facility-administered medications for this visit. Allergies   Allergen Reactions    Ace Inhibitors Cough    Other Medication Unable to [employer] names, 1 for sinus,1 for bp and 1 for bladder pt states it is called \"sentura\"    Penicillins Rash    Sanctura [Trospium] Other (comments)    Tudorza Pressair [Aclidinium Bromide] Swelling    Vesicare [Solifenacin] Rash and Other (comments)     Review of Systems:  - Cardiovascular: no chest pain  - Neurological: no tremors  - Integumentary: skin is normal    Physical Examination:  There were no vitals taken for this visit.   - GENERAL: NCAT, Appears well nourished   - EYES: EOMI, non-icteric, no proptosis   - Ear/Nose/Throat: NCAT, no visible inflammation or masses   - CARDIOVASCULAR: no cyanosis, no visible JVD   - RESPIRATORY: respiratory effort normal without any distress or labored breathing   - MUSCULOSKELETAL: Normal ROM of neck and upper extremities observed   - SKIN: No rash on face   - NEUROLOGIC:  No facial asymmetry (Cranial nerve 7 motor function), No gaze palsy   - PSYCHIATRIC: Normal affect, Normal insight and judgement   -     Data Reviewed:   - none new for review    Assessment/Plan:   1) Hypothyroidism > Will order TFTs and adjust her doses as needed. 2) Polydipsea > Will order an A1C, cmp, urine osm and serum osm to rule out DM or look for evidence of DI as the cause of her polydipsia    Pt voices understanding and agreement with the plan. Pain noted and pt was recommended to call her PCP for further evaluation and treatment, as needed    RTC based on her above lab results.           Copy sent to:  Dr. Silvana Hendricks

## 2021-03-03 DIAGNOSIS — I10 ESSENTIAL HYPERTENSION: Chronic | ICD-10-CM

## 2021-03-03 RX ORDER — AMLODIPINE BESYLATE 5 MG/1
TABLET ORAL
Qty: 90 TAB | Refills: 1 | Status: SHIPPED | OUTPATIENT
Start: 2021-03-03 | End: 2021-06-22 | Stop reason: SDUPTHER

## 2021-03-13 LAB
ALBUMIN SERPL-MCNC: 4.8 G/DL (ref 3.8–4.9)
ALBUMIN/GLOB SERPL: 1.8 {RATIO} (ref 1.2–2.2)
ALP SERPL-CCNC: 59 IU/L (ref 39–117)
ALT SERPL-CCNC: 30 IU/L (ref 0–32)
AST SERPL-CCNC: 75 IU/L (ref 0–40)
BILIRUB SERPL-MCNC: 0.5 MG/DL (ref 0–1.2)
BUN SERPL-MCNC: 11 MG/DL (ref 6–24)
BUN/CREAT SERPL: 7 (ref 9–23)
CALCIUM SERPL-MCNC: 10.1 MG/DL (ref 8.7–10.2)
CHLORIDE SERPL-SCNC: 98 MMOL/L (ref 96–106)
CO2 SERPL-SCNC: 26 MMOL/L (ref 20–29)
CREAT SERPL-MCNC: 1.69 MG/DL (ref 0.57–1)
EST. AVERAGE GLUCOSE BLD GHB EST-MCNC: 128 MG/DL
GLOBULIN SER CALC-MCNC: 2.6 G/DL (ref 1.5–4.5)
GLUCOSE SERPL-MCNC: 96 MG/DL (ref 65–99)
HBA1C MFR BLD: 6.1 % (ref 4.8–5.6)
INTERPRETATION: NORMAL
OSMOLALITY SERPL: 285 MOSMOL/KG (ref 275–295)
OSMOLALITY UR: 893 MOSMOL/KG
POTASSIUM SERPL-SCNC: 4.1 MMOL/L (ref 3.5–5.2)
PROT SERPL-MCNC: 7.4 G/DL (ref 6–8.5)
SODIUM SERPL-SCNC: 141 MMOL/L (ref 134–144)
T3 SERPL-MCNC: <20 NG/DL (ref 71–180)
T4 FREE SERPL-MCNC: <0.1 NG/DL (ref 0.82–1.77)
TSH SERPL DL<=0.005 MIU/L-ACNC: 59.8 UIU/ML (ref 0.45–4.5)

## 2021-03-15 ENCOUNTER — TELEPHONE (OUTPATIENT)
Dept: ENDOCRINOLOGY | Age: 51
End: 2021-03-15

## 2021-03-15 NOTE — TELEPHONE ENCOUNTER
I called MsLalit Oanh Second back and relayed the message from Dr. Denver Bien. She said she would do as instructed.   Ivonne Garcia

## 2021-03-15 NOTE — TELEPHONE ENCOUNTER
Sarah Moody reviewed: This is a patient of Dr. Mirta Gardiner. Her thyroid levels went from being normal previously to being very low now. She should make sure she is taking her thyroid hormone tablets each day, and correctly. She also needs to schedule a follow up with Dr. Rebecca Gomez which does not seem to be scheduled as of yet. Please let her know. I will forward this note to Dr. Rebecca Gomez for his review. Tonie Kauffman.  [facility]  [address] [place] [address]

## 2021-03-15 NOTE — TELEPHONE ENCOUNTER
I called Ms. Chan Thapa and relayed the message from Dr. Josr Rojas. She said she has STOPPED ALL her medications for the past 2-3 months because she had gained some weight and she didn't know what was causing it. She told me that she stopped everything and will wait for the doctors to reevaluate her and restart her on her medications as needed. I let her know I would pass this on to Dr. Josr Rojas.   Patrizia mckeon

## 2021-03-15 NOTE — TELEPHONE ENCOUNTER
Thank you, her thyroid medicine was not what was causing her weight gain, and she will in fact likely gain weight while OFF her thyroid hormone. She should restart it and take it as previously directed by Dr. Sean Ann. She should also be advised to notify her other doctors that she has discontinued her medications for her safety. Thanks ! Rogelio Ugarte.  [facility]  [address] [place] [address]

## 2021-06-07 ENCOUNTER — HOSPITAL ENCOUNTER (EMERGENCY)
Age: 51
Discharge: HOME | End: 2021-06-07
Attending: STUDENT IN AN ORGANIZED HEALTH CARE EDUCATION/TRAINING PROGRAM
Payer: MEDICAID

## 2021-06-07 VITALS
WEIGHT: 270.5 LBS | HEIGHT: 64 IN | HEART RATE: 52 BPM | OXYGEN SATURATION: 100 % | BODY MASS INDEX: 46.18 KG/M2 | TEMPERATURE: 98.4 F | DIASTOLIC BLOOD PRESSURE: 82 MMHG | RESPIRATION RATE: 16 BRPM | SYSTOLIC BLOOD PRESSURE: 170 MMHG

## 2021-06-07 DIAGNOSIS — H57.89 RED EYE: Primary | ICD-10-CM

## 2021-06-07 DIAGNOSIS — I10 ASYMPTOMATIC HYPERTENSION: ICD-10-CM

## 2021-06-07 PROCEDURE — 99283 EMERGENCY DEPT VISIT LOW MDM: CPT

## 2021-06-07 PROCEDURE — 74011000250 HC RX REV CODE- 250: Performed by: PHYSICIAN ASSISTANT

## 2021-06-07 RX ORDER — KETOTIFEN FUMARATE 0.35 MG/ML
1 SOLUTION/ DROPS OPHTHALMIC 2 TIMES DAILY
Qty: 5 ML | Refills: 0 | Status: SHIPPED | OUTPATIENT
Start: 2021-06-07 | End: 2021-06-17

## 2021-06-07 RX ORDER — KETOROLAC TROMETHAMINE 5 MG/ML
1 SOLUTION OPHTHALMIC 4 TIMES DAILY
Qty: 1 BOTTLE | Refills: 0 | Status: SHIPPED | OUTPATIENT
Start: 2021-06-07 | End: 2021-06-17

## 2021-06-07 RX ORDER — CIPROFLOXACIN HYDROCHLORIDE 3 MG/G
OINTMENT OPHTHALMIC
Qty: 3.5 G | Refills: 0 | Status: SHIPPED | OUTPATIENT
Start: 2021-06-07 | End: 2021-10-19 | Stop reason: ALTCHOICE

## 2021-06-07 RX ORDER — TETRACAINE HYDROCHLORIDE 5 MG/ML
1 SOLUTION OPHTHALMIC
Status: COMPLETED | OUTPATIENT
Start: 2021-06-07 | End: 2021-06-07

## 2021-06-07 RX ADMIN — FLUORESCEIN SODIUM 1 STRIP: 1 STRIP OPHTHALMIC at 15:50

## 2021-06-07 RX ADMIN — TETRACAINE HYDROCHLORIDE 1 DROP: 5 SOLUTION OPHTHALMIC at 15:51

## 2021-06-07 NOTE — ED PROVIDER NOTES
EMERGENCY DEPARTMENT HISTORY AND PHYSICAL EXAM      Date: 6/7/2021  Patient Name: Osvaldo Orozco    History of Presenting Illness     Chief Complaint   Patient presents with    Eye Problem     Left eye irritated since yesterday, initially perspiration. Burning pain after using visine. Today pain and swelling, watery eye reported. History Provided By: Patient    HPI: Osvaldo Orozco, 45 y.o. female with PMHx of Grave's disease, HTN, GERD, CKD, presents BIB self to the ED with cc of L sided otalgia, onset yesterday while gardening. Patient was mulching and planting flowers while wearing goggles when she felt the sensation of sweat running into her left eye. Patient states the pain was stinging and burning. She noticed blurred vision out of the left eye. She went inside and removed her contact lenses and flushed her eyes with water. She had only temporary improvement in her symptoms. She now complains of the same burning and stinging pain and photophobia out of the left eye only. Right eye is unaffected. Admits to increased tearing from the left eye. Denies purulent drainage or crusting, visual field loss, headache, vomiting. Patient states she has a regular eye doctor, as well as an eye specialist that performed laser surgery to remove \"floaters\" from her eyes. There are no other complaints, changes, or physical findings at this time. PCP: Angel Baez MD    No current facility-administered medications on file prior to encounter. Current Outpatient Medications on File Prior to Encounter   Medication Sig Dispense Refill    liothyronine (CYTOMEL) 25 mcg tablet TAKE 1/2 TABLET BY MOUTH EVERY DAY 15 Tab 3    levothyroxine (SYNTHROID) 300 mcg tablet TAKE 1 TABLET BY MOUTH EVERY DAY BEFORE BREAKFAST 30 Tab 2    amLODIPine (NORVASC) 5 mg tablet TAKE 1 TABLET BY MOUTH EVERY DAY 90 Tab 1    omeprazole (PRILOSEC) 40 mg capsule Take 40 mg by mouth daily.       indapamide (LOZOL) 2.5 mg tablet TAKE 2 TABLETS BY MOUTH EVERY  Tab 2    ibuprofen (MOTRIN) 800 mg tablet TAKE 1 TAB BY MOUTH EVERY 8 HOURS AS NEEDED WITH FOOD 60 Tab 1    busPIRone (BUSPAR) 7.5 mg tablet TAKE 1 TABLET BY MOUTH THREE TIMES A DAY 90 Tab 1    cyclobenzaprine (FLEXERIL) 10 mg tablet Take 1 Tab by mouth three (3) times daily as needed for Muscle Spasm(s). 27 Tab 1       Past History     Past Medical History:  Past Medical History:   Diagnosis Date    Chronic kidney disease     kidney failure-left    Depression     GERD (gastroesophageal reflux disease)     Grave's disease     Hypertension     Hypothyroidism following radioiodine therapy     2009    Liver disease     Morbid obesity ([address]     Nausea & vomiting     Obstructive sleep apnea (adult) (pediatric) 1/8/2013    Thyroid disease        Past Surgical History:  Past Surgical History:   Procedure Laterality Date    COLONOSCOPY N/A 7/12/2019    COL[facility] Katharine [facility]cker [facility] ENDOSCOPY    HX CHOLECYSTECTOMY      HX GYN  2005    novasure    HX ORTHOPAEDIC      Left arm    HX UROLOGICAL      bladder sling       Family History:  Family History   Problem Relation Age of Onset   Buren Neer Hypertension Mother     Emphysema Mother     Hypertension Father     Other Father     Heart Attack Father     Stroke Father     Osteoporosis Father     Diabetes Sister     Hypertension Brother        Social History:  Social History     Tobacco Use    Smoking status: Current Some Day Smoker     Packs/day: 0.25    Smokeless tobacco: Never Used   Substance Use Topics    Alcohol use: Not Currently     Comment: rare, 1 drink a month    Drug use: No       Allergies:   Allergies   Allergen Reactions    Ace Inhibitors Cough    Other Medication Unable to [employer] names, 1 for sinus,1 for bp and 1 for bladder pt states it is called \"sentura\"    Penicillins Rash    Sanctura [Trospium] Other (comments)    Tiffany Osborne [Aclidinium Bromide] Swelling    Vesicare [Solifenacin] Rash and Other (comments)         Review of Systems   Review of Systems   Constitutional: Negative for fever. HENT: Negative for congestion and sore throat. Eyes: Positive for photophobia, pain, discharge (tearing) and redness. Gastrointestinal: Negative for nausea and vomiting. Neurological: Negative for dizziness and headaches. Hematological: Does not bruise/bleed easily. Physical Exam   Physical Exam  Vitals and nursing note reviewed. Constitutional:       General: She is in acute distress (uncomfortable, holding ice pack over L eye). Appearance: Normal appearance. She is well-developed. She is obese. She is not diaphoretic. HENT:      Head: Normocephalic and atraumatic. Nose: Nose normal.      Mouth/Throat:      Mouth: Mucous membranes are moist.   Eyes:      General: Lids are normal. Lids are everted, no foreign bodies appreciated. Gaze aligned appropriately. Right eye: No foreign body. Left eye: No foreign body. Intraocular pressure: Right eye pressure is 30 mmHg. Left eye pressure is 27 mmHg. Measurements were taken using a handheld tonometer. Extraocular Movements: Extraocular movements intact. Conjunctiva/sclera:      Right eye: Right conjunctiva is injected. No exudate or hemorrhage. Left eye: Left conjunctiva is injected. No exudate or hemorrhage. Pupils: Pupils are equal, round, and reactive to light. Left eye: No corneal abrasion or fluorescein uptake. Anthony exam negative. Slit lamp exam:     Left eye: Photophobia present. No corneal ulcer, foreign body, hyphema or hypopyon. Comments: Exophthalmos noted b/l. Increased hearing noted b/l. No purulent drainage or crusting. Pulmonary:      Effort: Pulmonary effort is normal.   Musculoskeletal:         General: Normal range of motion. Cervical back: Normal range of motion and neck supple.    Skin:     General: Skin is warm and dry. Neurological:      General: No focal deficit present. Mental Status: She is alert and oriented to person, place, and time. Psychiatric:         Mood and Affect: Mood normal.         Behavior: Behavior normal. Behavior is cooperative. Diagnostic Study Results     Labs -   No results found for this or any previous visit (from the past 12 hour(s)). Radiologic Studies -   No orders to display     CT Results  (Last 48 hours)    None        CXR Results  (Last 48 hours)    None            Medical Decision Making   I am the first provider for this patient. I reviewed the vital signs, available nursing notes, past medical history, past surgical history, family history and social history. Vital Signs-Reviewed the patient's vital signs. Patient Vitals for the past 12 hrs:   Temp Pulse Resp BP SpO2   06/07/21 1508 98.4 °F (36.9 °C) (!) 52 16 (!) 170/82 100 %       Records Reviewed: Nursing Notes    Provider Notes (Medical Decision Making):     No evidence of abrasion, ulceration, or foreign body on exam.  IOP is slightly above average, however this is noted bilaterally, patient is only symptomatic on the left side. Discussed possibility of pollen or other garden debris getting into her eye yesterday while gardening. We will plan to discharge patient with antihistamine gtt, Acular gtt, abx ointment. Instructed patient to discontinue contact lens use immediately (she is wearing her R contact only at this time). Instructed patient to call her eye doctor today for a follow-up appointment in the next 1 to 2 days. Also discussed patient's elevated blood pressure. She is taking her home meds. ED Course:   Initial assessment performed. The patients presenting problems have been discussed, and they are in agreement with the care plan formulated and outlined with them. I have encouraged them to ask questions as they arise throughout their visit.          Critical Care Time: None    Disposition:  D/c    PLAN:  1. Discharge Medication List as of 6/7/2021  4:23 PM      START taking these medications    Details   ciprofloxacin HCl (Ciloxan) 0.3 % ophthalmic ointment Apply a ribbon to the left eye 4 times a day for 5 days, Normal, Disp-3.5 g, R-0      ketorolac (Acular) 0.5 % ophthalmic solution Apply 1 Drop to eye four (4) times daily for 10 days. , Normal, Disp-1 Bottle, R-0      ketotifen (ZADITOR) 0.025 % (0.035 %) ophthalmic solution Administer 1 Drop to both eyes two (2) times a day for 10 days. , Normal, Disp-5 mL, R-0         CONTINUE these medications which have NOT CHANGED    Details   liothyronine (CYTOMEL) 25 mcg tablet TAKE 1/2 TABLET BY MOUTH EVERY DAY, Normal, Disp-15 Tab, R-3      levothyroxine (SYNTHROID) 300 mcg tablet TAKE 1 TABLET BY MOUTH EVERY DAY BEFORE BREAKFAST, Normal, Disp-30 Tab, R-2      amLODIPine (NORVASC) 5 mg tablet TAKE 1 TABLET BY MOUTH EVERY DAY, Normal, Disp-90 Tab, R-1      omeprazole (PRILOSEC) 40 mg capsule Take 40 mg by mouth daily. , Historical Med      indapamide (LOZOL) 2.5 mg tablet TAKE 2 TABLETS BY MOUTH EVERY DAY, Normal, Disp-180 Tab,R-2      ibuprofen (MOTRIN) 800 mg tablet TAKE 1 TAB BY MOUTH EVERY 8 HOURS AS NEEDED WITH FOOD, Normal, Disp-60 Tab, R-1      busPIRone (BUSPAR) 7.5 mg tablet TAKE 1 TABLET BY MOUTH THREE TIMES A DAY, Normal, Disp-90 Tab, R-1      cyclobenzaprine (FLEXERIL) 10 mg tablet Take 1 Tab by mouth three (3) times daily as needed for Muscle Spasm(s). , Normal, Disp-30 Tab, R-1           2.    Follow-up Information     Follow up With Specialties Details Why Contact Info    [facility] [place] [facility] Emergency Medicine  As needed, If symptoms worsen [address]  [address] [facility]  [phone]    Your ophthalmologist  Call  For follow up in 1-2 days     [facility]  Call  For follow up [address] [id number]        Return to ED if worse     Diagnosis Clinical Impression:   1. Red eye    2. Asymptomatic hypertension          Please note that this dictation was completed with [employer], the computer voice recognition software. Quite often unanticipated grammatical, syntax, homophones, and other interpretive errors are inadvertently transcribed by the computer software. Please disregards these errors. Please excuse any errors that have escaped final proofreading.

## 2021-06-07 NOTE — ED NOTES
REJI Gibson has reviewed discharge instructions with the patient. The patient verbalized understanding. Patient ambulatory out of ED at this time.

## 2021-06-07 NOTE — Clinical Note
[address] 
[facility] EMERGENCY DEPT 
[address] [place] [id number] [phone] Work/School Note Date: 6/7/2021 To Whom It May concern: 
 
Inga Dawkins was seen and treated today in the emergency room by the following provider(s): 
Attending Provider: Franck Berry MD 
Physician Assistant: Joann Nunn. Inga Dawkins is excused from work/school on 6/7/2021 through 6/10/2021. She is medically clear to return to work/school on 6/11/2021. Sincerely, Joann Hanson

## 2021-06-22 ENCOUNTER — OFFICE VISIT (OUTPATIENT)
Dept: INTERNAL MEDICINE CLINIC | Age: 51
End: 2021-06-22
Payer: MEDICAID

## 2021-06-22 VITALS
HEART RATE: 75 BPM | SYSTOLIC BLOOD PRESSURE: 144 MMHG | RESPIRATION RATE: 19 BRPM | WEIGHT: 271 LBS | DIASTOLIC BLOOD PRESSURE: 85 MMHG | OXYGEN SATURATION: 99 % | HEIGHT: 64 IN | BODY MASS INDEX: 46.26 KG/M2 | TEMPERATURE: 98.2 F

## 2021-06-22 DIAGNOSIS — N18.30 STAGE 3 CHRONIC KIDNEY DISEASE, UNSPECIFIED WHETHER STAGE 3A OR 3B CKD (HCC): ICD-10-CM

## 2021-06-22 DIAGNOSIS — R73.03 PREDIABETES: ICD-10-CM

## 2021-06-22 DIAGNOSIS — D64.9 ANEMIA, UNSPECIFIED TYPE: ICD-10-CM

## 2021-06-22 DIAGNOSIS — I12.9 HYPERTENSION, RENAL DISEASE, STAGE 1-4 OR UNSPECIFIED CHRONIC KIDNEY DISEASE: Primary | ICD-10-CM

## 2021-06-22 DIAGNOSIS — F41.9 ANXIETY: ICD-10-CM

## 2021-06-22 DIAGNOSIS — Z12.31 BREAST CANCER SCREENING BY MAMMOGRAM: ICD-10-CM

## 2021-06-22 DIAGNOSIS — E89.0 HYPOTHYROIDISM FOLLOWING RADIOIODINE THERAPY: ICD-10-CM

## 2021-06-22 PROCEDURE — 99214 OFFICE O/P EST MOD 30 MIN: CPT | Performed by: INTERNAL MEDICINE

## 2021-06-22 RX ORDER — AMLODIPINE BESYLATE 5 MG/1
5 TABLET ORAL DAILY
Qty: 90 TABLET | Refills: 1 | Status: SHIPPED | OUTPATIENT
Start: 2021-06-22 | End: 2022-02-15 | Stop reason: SDUPTHER

## 2021-06-22 RX ORDER — INDAPAMIDE 2.5 MG/1
TABLET, FILM COATED ORAL
Qty: 180 TABLET | Refills: 2 | Status: SHIPPED | OUTPATIENT
Start: 2021-06-22 | End: 2022-02-15 | Stop reason: SDUPTHER

## 2021-06-22 RX ORDER — BUSPIRONE HYDROCHLORIDE 7.5 MG/1
7.5 TABLET ORAL 3 TIMES DAILY
Qty: 90 TABLET | Refills: 5 | Status: SHIPPED | OUTPATIENT
Start: 2021-06-22 | End: 2022-02-15 | Stop reason: SDUPTHER

## 2021-06-22 NOTE — PROGRESS NOTES
Chief Complaint   Patient presents with    Blood Pressure Check     1. Have you been to the ER, urgent care clinic since your last visit? Hospitalized since your last visit? Yes When: 6/7/21 [facility] for eye swelling    2. Have you seen or consulted any other health care providers outside of the [address] [place] [address] since your last visit? Include any pap smears or colon screening.  No

## 2021-06-22 NOTE — PROGRESS NOTES
Nhi Street is a 45 y.o. female and presents with Blood Pressure Check  . Subjective:    Pt has no new concerns today. PMH-HTN-on indapamide 5mg ONLY     BP Readings from Last 3 Encounters:   06/22/21 (!) 144/85   06/07/21 (!) 170/82   08/17/20 142/90        Hypothyroidism-pt is currently on 300mcg levothyroxine and 25mcg of iothyronine. Pt relays she takes in the AM, fasting    -pt  is followed by endocrinology Dr. Cathie Levine     Lab Results   Component Value Date/Time    TSH 59.800 (H) 03/12/2021 01:34 PM    T3 Uptake 34 11/06/2019 03:21 PM    T4, Free <0.10 (L) 03/12/2021 01:34 PM    T4, Total 17.1 (H) 11/06/2019 03:21 PM        LUCINA-pt uses CPAP     Vit d def-  Lab Results   Component Value Date/Time    VITAMIN D, 25-HYDROXY 21.5 (L) 04/19/2019 11:56 AM        H/o cigarette smoker- quit 2019      Anxiety-MUCH improved on buspar     Morbid obesity-  Wt Readings from Last 3 Encounters:   06/22/21 271 lb (122.9 kg)   06/07/21 270 lb 8.1 oz (122.7 kg)   08/17/20 271 lb (122.9 kg)      Chronic lbp w radiculopathy-pt reports that gabapentin 300mg TID relieves her back pain, but not the neuropathy    -pt declines maxing dose of gabapentin due to SE     -pt declines pain management referral      CKD 3-  Lab Results   Component Value Date/Time    GFR est AA 40 (L) 03/12/2021 01:34 PM    GFR est non-AA 35 (L) 03/12/2021 01:34 PM    Creatinine 1.69 (H) 03/12/2021 01:34 PM    BUN 11 03/12/2021 01:34 PM    Sodium 141 03/12/2021 01:34 PM    Potassium 4.1 03/12/2021 01:34 PM    Chloride 98 03/12/2021 01:34 PM    CO2 26 03/12/2021 01:34 PM         Review of Systems  Constitutional: negative for fevers, chills, anorexia and weight loss  Eyes:   negative for visual disturbance and irritation  ENT:   negative for tinnitus,sore throat,nasal congestion,ear pains. hoarseness  Respiratory:  negative for cough, hemoptysis, dyspnea,wheezing  CV:   negative for chest pain, palpitations, lower extremity edema  GI:   negative for nausea, vomiting, diarrhea, abdominal pain,melena  Neurological:  negative for headaches, dizziness, vertigo, memory problems and gait     Past Medical History:   Diagnosis Date    Chronic kidney disease     kidney failure-left    Depression     GERD (gastroesophageal reflux disease)     Grave's disease     Hypertension     Hypothyroidism following radioiodine therapy     2009    Liver disease     Morbid obesity ([address]     Nausea & vomiting     Obstructive sleep apnea (adult) (pediatric) 1/8/2013    Thyroid disease      Past Surgical History:   Procedure Laterality Date    COLONOSCOPY N/A 7/12/2019    COLONOSCOPY performed by Kel Vick MD at [facility] ENDOSCOPY    HX CHOLECYSTECTOMY      HX GYN  2005    novasure    HX ORTHOPAEDIC      Left arm    HX UROLOGICAL      bladder sling     Social History     Socioeconomic History    Marital status:      Spouse name: Not on file    Number of children: Not on file    Years of education: Not on file    Highest education level: Not on file   Tobacco Use    Smoking status: Current Some Day Smoker     Packs/day: 0.25    Smokeless tobacco: Never Used   Vaping Use    Vaping Use: Never used   Substance and Sexual Activity    Alcohol use: Not Currently     Comment: rare, 1 drink a month    Drug use: No    Sexual activity: Not Currently     Social Determinants of Health     Financial Resource Strain:     Difficulty of Paying Living Expenses:    Food Insecurity:     Worried About Running Out of Food in the Last Year:     Ran Out of Food in the Last Year:    Transportation Needs:     Lack of Transportation (Medical):      Lack of Transportation (Non-Medical):    Physical Activity:     Days of Exercise per Week:     Minutes of Exercise per Session:    Stress:     Feeling of Stress :    Social Connections:     Frequency of Communication with Friends and Family:     Frequency of Social Gatherings with Friends and Family:     Attends [employer] Services:     Active Member of Clubs or Organizations:     Attends Club or Organization Meetings:     Marital Status:      Family History   Problem Relation Age of Onset    Hypertension Mother     Emphysema Mother     Hypertension Father     Other Father     Heart Attack Father     Stroke Father     Osteoporosis Father     Diabetes Sister     Hypertension Brother      Current Outpatient Medications   Medication Sig Dispense Refill    busPIRone (BUSPAR) 7.5 mg tablet Take 1 Tablet by mouth three (3) times daily. 90 Tablet 5    amLODIPine (NORVASC) 5 mg tablet Take 1 Tablet by mouth daily. 90 Tablet 1    indapamide (LOZOL) 2.5 mg tablet TAKE 2 TABLETS BY MOUTH EVERY  Tablet 2    ciprofloxacin HCl (Ciloxan) 0.3 % ophthalmic ointment Apply a ribbon to the left eye 4 times a day for 5 days 3.5 g 0    liothyronine (CYTOMEL) 25 mcg tablet TAKE 1/2 TABLET BY MOUTH EVERY DAY 15 Tab 3    levothyroxine (SYNTHROID) 300 mcg tablet TAKE 1 TABLET BY MOUTH EVERY DAY BEFORE BREAKFAST 30 Tab 2    omeprazole (PRILOSEC) 40 mg capsule Take 40 mg by mouth daily.  ibuprofen (MOTRIN) 800 mg tablet TAKE 1 TAB BY MOUTH EVERY 8 HOURS AS NEEDED WITH FOOD 60 Tab 1    cyclobenzaprine (FLEXERIL) 10 mg tablet Take 1 Tab by mouth three (3) times daily as needed for Muscle Spasm(s).  30 Tab 1     Allergies   Allergen Reactions    Ace Inhibitors Cough    Other Medication Unable to [employer] names, 1 for sinus,1 for bp and 1 for bladder pt states it is called \"sentura\"    Penicillins Rash    Sanctura [Trospium] Other (comments)    Tudorza Pressair [Aclidinium Bromide] Swelling    Vesicare [Solifenacin] Rash and Other (comments)       Objective:  Visit Vitals  BP (!) 144/85 (BP 1 Location: Left upper arm, BP Patient Position: Sitting, BP Cuff Size: Adult)   Pulse 75   Temp 98.2 °F (36.8 °C) (Temporal)   Resp 19   Ht 5' 4\" (1.626 m)   Wt 271 lb (122.9 kg)   SpO2 99%   BMI 46.52 kg/m²     Physical Exam:   General appearance - alert, obese, in NAD  Mental status - alert, oriented to person, place, and time  EYE-MAX, EOMI, corneas normal, no foreign bodies  Neck - supple, no significant adenopathy   Chest - clear to auscultation, no wheezes, rales or rhonchi, symmetric air entry   Heart - normal rate, regular rhythm, normal S1, S2, 2/6 systolic murmur  Abdomen - soft, nontender, obese +bs  Skin-Warm and dry. no hyperpigmentation, vitiligo, or suspicious lesions  Neuro -alert, oriented, normal speech, no focal findings or movement disorder noted      Results for orders placed or performed in visit on 11/12/20   HEMOGLOBIN A1C WITH EAG   Result Value Ref Range    Hemoglobin A1c 6.1 (H) 4.8 - 5.6 %    Estimated average glucose 687 mg/dL   METABOLIC PANEL, COMPREHENSIVE   Result Value Ref Range    Glucose 96 65 - 99 mg/dL    BUN 11 6 - 24 mg/dL    Creatinine 1.69 (H) 0.57 - 1.00 mg/dL    GFR est non-AA 35 (L) >59 mL/min/1.73    GFR est AA 40 (L) >59 mL/min/1.73    BUN/Creatinine ratio 7 (L) 9 - 23    Sodium 141 134 - 144 mmol/L    Potassium 4.1 3.5 - 5.2 mmol/L    Chloride 98 96 - 106 mmol/L    CO2 26 20 - 29 mmol/L    Calcium 10.1 8.7 - 10.2 mg/dL    Protein, total 7.4 6.0 - 8.5 g/dL    Albumin 4.8 3.8 - 4.9 g/dL    GLOBULIN, TOTAL 2.6 1.5 - 4.5 g/dL    A-G Ratio 1.8 1.2 - 2.2    Bilirubin, total 0.5 0.0 - 1.2 mg/dL    Alk.  phosphatase 59 39 - 117 IU/L    AST (SGOT) 75 (H) 0 - 40 IU/L    ALT (SGPT) 30 0 - 32 IU/L   OSMOLALITY, SERUM/PLASMA   Result Value Ref Range    Osmolality, Serum 285 275 - 295 mOsmol/kg   OSMOLALITY, UR   Result Value Ref Range    Osmolality, Urine 893 mOsmol/kg   TSH 3RD GENERATION   Result Value Ref Range    TSH 59.800 (H) 0.450 - 4.500 uIU/mL   T4, FREE   Result Value Ref Range    T4, Free <0.10 (L) 0.82 - 1.77 ng/dL   T3 TOTAL   Result Value Ref Range    T3, total <20 (L) 71 - 180 ng/dL   CKD REPORT   Result Value Ref Range    Interpretation Note        Assessment/Plan:    ICD-10-CM ICD-9-CM    1. Hypertension, renal disease, stage 1-4 or unspecified chronic kidney disease  I12.9 403.90 amLODIPine (NORVASC) 5 mg tablet      indapamide (LOZOL) 2.5 mg tablet   2. Stage 3 chronic kidney disease, unspecified whether stage 3a or 3b CKD (HCC)  B95.61 289.8 METABOLIC PANEL, COMPREHENSIVE   3. Hypothyroidism following radioiodine therapy  E89.0 244.1 THYROID CASCADE PROFILE   4. Prediabetes  R73.03 790.29 HEMOGLOBIN A1C WITH EAG   5. Anemia, unspecified type  D64.9 285.9 CBC WITH AUTOMATED DIFF      FERRITIN   6. Anxiety  F41.9 300.00 busPIRone (BUSPAR) 7.5 mg tablet   7. Breast cancer screening by mammogram  Z12.31 V76.12 [facility] MAMMO BI SCREENING INCL CAD     Orders Placed This Encounter    [facility] MAMMO BI SCREENING INCL CAD     Standing Status:   Future     Standing Expiration Date:   12/22/2021   [facility] THYROID CASCADE PROFILE     Standing Status:   Future     Standing Expiration Date:   6/22/2022    HEMOGLOBIN A1C WITH EAG     Standing Status:   Future     Standing Expiration Date:   8/45/2788    METABOLIC PANEL, COMPREHENSIVE     Standing Status:   Future     Standing Expiration Date:   6/22/2022    CBC WITH AUTOMATED DIFF     Standing Status:   Future     Standing Expiration Date:   6/22/2022    FERRITIN     Standing Status:   Future     Standing Expiration Date:   6/22/2022    busPIRone (BUSPAR) 7.5 mg tablet     Sig: Take 1 Tablet by mouth three (3) times daily. Dispense:  90 Tablet     Refill:  5    amLODIPine (NORVASC) 5 mg tablet     Sig: Take 1 Tablet by mouth daily. Dispense:  90 Tablet     Refill:  1    indapamide (LOZOL) 2.5 mg tablet     Sig: TAKE 2 TABLETS BY MOUTH EVERY DAY     Dispense:  180 Tablet     Refill:  2       1. Hypertension, renal disease  Relatively controlled on current mngmnt  D/w pt avoidance of excess sodium    2. Chronic renal disease, stage III (HCC)  Noted  D/w pt wt loss and its impact on renal fxn    3.  Hypothyroidism following radioiodine therapy  Check labs    4. Morbid obesity ([address]  Noted    5. LUCINA (obstructive sleep apnea)  Cont CPAP    6. Anxiety disorder, unspecified type  Improved    7. Generalized OA  Noted          There are no Patient Instructions on file for this visit. Follow-up and Dispositions    · Return in about 4 months (around 10/22/2021) for bp f/u. I have reviewed with the patient details of the assessment and plan and all questions were answered. Relevent patient education was performed. The most recent lab findings were reviewed with the patient. An After Visit Summary was printed and given to the patient.

## 2021-07-23 ENCOUNTER — HOSPITAL ENCOUNTER (OUTPATIENT)
Dept: MAMMOGRAPHY | Age: 51
Discharge: HOME | End: 2021-07-23
Payer: MEDICAID

## 2021-07-23 DIAGNOSIS — Z12.31 BREAST CANCER SCREENING BY MAMMOGRAM: ICD-10-CM

## 2021-07-23 PROCEDURE — 77067 SCR MAMMO BI INCL CAD: CPT

## 2021-07-26 DIAGNOSIS — E89.0 HYPOTHYROIDISM FOLLOWING RADIOIODINE THERAPY: ICD-10-CM

## 2021-07-26 PROBLEM — R73.03 PREDIABETES: Status: ACTIVE | Noted: 2021-07-26

## 2021-07-26 RX ORDER — LEVOTHYROXINE SODIUM 300 UG/1
300 TABLET ORAL
Qty: 30 TABLET | Refills: 1 | Status: SHIPPED | OUTPATIENT
Start: 2021-07-26

## 2021-10-19 ENCOUNTER — OFFICE VISIT (OUTPATIENT)
Dept: INTERNAL MEDICINE CLINIC | Age: 51
End: 2021-10-19
Payer: MEDICAID

## 2021-10-19 VITALS
SYSTOLIC BLOOD PRESSURE: 139 MMHG | HEART RATE: 73 BPM | WEIGHT: 259 LBS | DIASTOLIC BLOOD PRESSURE: 79 MMHG | HEIGHT: 64 IN | RESPIRATION RATE: 19 BRPM | TEMPERATURE: 98.2 F | OXYGEN SATURATION: 97 % | BODY MASS INDEX: 44.22 KG/M2

## 2021-10-19 DIAGNOSIS — E89.0 HYPOTHYROIDISM FOLLOWING RADIOIODINE THERAPY: ICD-10-CM

## 2021-10-19 DIAGNOSIS — I12.9 HYPERTENSION, RENAL DISEASE, STAGE 1-4 OR UNSPECIFIED CHRONIC KIDNEY DISEASE: Primary | ICD-10-CM

## 2021-10-19 DIAGNOSIS — R73.03 PREDIABETES: ICD-10-CM

## 2021-10-19 DIAGNOSIS — F41.9 ANXIETY DISORDER, UNSPECIFIED TYPE: ICD-10-CM

## 2021-10-19 DIAGNOSIS — G47.33 OSA (OBSTRUCTIVE SLEEP APNEA): ICD-10-CM

## 2021-10-19 DIAGNOSIS — E55.9 VITAMIN D DEFICIENCY: ICD-10-CM

## 2021-10-19 DIAGNOSIS — E66.01 MORBID OBESITY (HCC): ICD-10-CM

## 2021-10-19 DIAGNOSIS — N18.30 STAGE 3 CHRONIC KIDNEY DISEASE, UNSPECIFIED WHETHER STAGE 3A OR 3B CKD (HCC): ICD-10-CM

## 2021-10-19 PROCEDURE — 99215 OFFICE O/P EST HI 40 MIN: CPT | Performed by: INTERNAL MEDICINE

## 2021-10-19 NOTE — PROGRESS NOTES
Eagle Fonseca is a 45 y.o. female and presents with Hypertension and Other (Hot flash, night sweats)  . Subjective:    Pt  w c/o hot flashes. PMH-HTN-on indapamide 5mg ONLY     BP Readings from Last 3 Encounters:   10/19/21 139/79   06/22/21 (!) 144/85   06/07/21 (!) 170/82        Hypothyroidism-pt is currently on 300mcg levothyroxine. Pt relays she is not taking her cytomel. Pt relays she takes in the AM, fasting    -pt  is followed by endocrinology Dr. Cecilia Walls     Lab Results   Component Value Date/Time    TSH 59.500 (H) 07/23/2021 01:44 PM    TSH 59.800 (H) 03/12/2021 01:34 PM    T3 Uptake 34 11/06/2019 03:21 PM    T4, Free <0.10 (L) 03/12/2021 01:34 PM    T4, Total 17.1 (H) 11/06/2019 03:21 PM        LUCINA-pt uses CPAP     Vit d def-  Lab Results   Component Value Date/Time    VITAMIN D, 25-HYDROXY 21.5 (L) 04/19/2019 11:56 AM        H/o cigarette smoker- quit 2019      Anxiety-MUCH improved on buspar     Morbid obesity-  Wt Readings from Last 3 Encounters:   10/19/21 259 lb (117.5 kg)   06/22/21 271 lb (122.9 kg)   06/07/21 270 lb 8.1 oz (122.7 kg)      Chronic lbp w radiculopathy-pt reports that gabapentin 300mg TID relieves her back pain, but not the neuropathy    -pt declines maxing dose of gabapentin due to SE     -pt declines pain management referral      CKD 3-  Lab Results   Component Value Date/Time    GFR est AA 43 (L) 07/23/2021 01:44 PM    GFR est non-AA 35 (L) 07/23/2021 01:44 PM    Creatinine 1.55 (H) 07/23/2021 01:44 PM    BUN 13 07/23/2021 01:44 PM    Sodium 140 07/23/2021 01:44 PM    Potassium 4.1 07/23/2021 01:44 PM    Chloride 104 07/23/2021 01:44 PM    CO2 32 07/23/2021 01:44 PM         Review of Systems  Constitutional: negative for fevers, chills, anorexia and weight loss  Eyes:   negative for visual disturbance and irritation  ENT:   negative for tinnitus,sore throat,nasal congestion,ear pains. hoarseness  Respiratory:  negative for cough, hemoptysis, dyspnea,wheezing  CV: negative for chest pain, palpitations, lower extremity edema  GI:   negative for nausea, vomiting, diarrhea, abdominal pain,melena  Neurological:  negative for headaches, dizziness, vertigo, memory problems and gait     Past Medical History:   Diagnosis Date    Chronic kidney disease     kidney failure-left    Depression     GERD (gastroesophageal reflux disease)     Grave's disease     Hypertension     Hypothyroidism following radioiodine therapy     2009    Liver disease     Morbid obesity ([address]     Nausea & vomiting     Obstructive sleep apnea (adult) (pediatric) 1/8/2013    Thyroid disease      Past Surgical History:   Procedure Laterality Date    COLONOSCOPY N/A 7/12/2019    COLONOSCOPY performed by Marga Pate MD at [facility] ENDOSCOPY    HX CHOLECYSTECTOMY      HX GYN  2005    novasure    HX ORTHOPAEDIC      Left arm    HX UROLOGICAL      bladder sling     Social History     Socioeconomic History    Marital status:      Spouse name: Not on file    Number of children: Not on file    Years of education: Not on file    Highest education level: Not on file   Tobacco Use    Smoking status: Current Some Day Smoker     Packs/day: 0.25    Smokeless tobacco: Never Used   Vaping Use    Vaping Use: Never used   Substance and Sexual Activity    Alcohol use: Not Currently     Comment: rare, 1 drink a month    Drug use: No    Sexual activity: Not Currently     Social Determinants of Health     Financial Resource Strain:     Difficulty of Paying Living Expenses:    Food Insecurity:     Worried About Running Out of Food in the Last Year:     Ran Out of Food in the Last Year:    Transportation Needs:     Lack of Transportation (Medical):      Lack of Transportation (Non-Medical):    Physical Activity:     Days of Exercise per Week:     Minutes of Exercise per Session:    Stress:     Feeling of Stress :    Social Connections:     Frequency of Communication with Friends and Family:  Frequency of Social Gatherings with Friends and Family:     Attends Jehovah's witness Services:     Active Member of Clubs or Organizations:     Attends Club or Organization Meetings:     Marital Status:      Family History   Problem Relation Age of Onset    Hypertension Mother     Emphysema Mother     Hypertension Father     Other Father     Heart Attack Father     Stroke Father     Osteoporosis Father     Diabetes Sister     Hypertension Brother      Current Outpatient Medications   Medication Sig Dispense Refill    levothyroxine (SYNTHROID) 300 mcg tablet Take 1 Tablet by mouth Daily (before breakfast). Call office to make a follow up appointment 30 Tablet 1    busPIRone (BUSPAR) 7.5 mg tablet Take 1 Tablet by mouth three (3) times daily. 90 Tablet 5    amLODIPine (NORVASC) 5 mg tablet Take 1 Tablet by mouth daily. 90 Tablet 1    indapamide (LOZOL) 2.5 mg tablet TAKE 2 TABLETS BY MOUTH EVERY  Tablet 2    omeprazole (PRILOSEC) 40 mg capsule Take 40 mg by mouth daily.  ibuprofen (MOTRIN) 800 mg tablet TAKE 1 TAB BY MOUTH EVERY 8 HOURS AS NEEDED WITH FOOD 60 Tab 1    cyclobenzaprine (FLEXERIL) 10 mg tablet Take 1 Tab by mouth three (3) times daily as needed for Muscle Spasm(s).  30 Tab 1     Allergies   Allergen Reactions    Ace Inhibitors Cough    Other Medication Unable to [employer] names, 1 for sinus,1 for bp and 1 for bladder pt states it is called \"sentura\"    Penicillins Rash    Sanctura [Trospium] Other (comments)    Tudorza Pressair [Aclidinium Bromide] Swelling    Vesicare [Solifenacin] Rash and Other (comments)       Objective:  Visit Vitals  /79   Pulse 73   Temp 98.2 °F (36.8 °C) (Temporal)   Resp 19   Ht 5' 4\" (1.626 m)   Wt 259 lb (117.5 kg)   SpO2 97%   BMI 44.46 kg/m²     Physical Exam:   General appearance - alert, obese, in NAD  Mental status - alert, oriented to person, place, and time  EYE-MAX, EOMI, corneas normal, no foreign bodies  Neck - supple, no significant adenopathy   Chest - clear to auscultation, no wheezes, rales or rhonchi, symmetric air entry   Heart - normal rate, regular rhythm, normal S1, S2, 2/6 systolic murmur  Abdomen - soft, nontender, obese +bs  Skin-Warm and dry. no hyperpigmentation, vitiligo, or suspicious lesions  Neuro -alert, oriented, normal speech, no focal findings or movement disorder noted      Results for orders placed or performed in visit on 07/23/21   FERRITIN   Result Value Ref Range    Ferritin 84 26 - 388 NG/ML   CBC WITH AUTOMATED DIFF   Result Value Ref Range    WBC 6.7 3.6 - 11.0 K/uL    RBC 4.64 3.80 - 5.20 M/uL    HGB 13.1 11.5 - 16.0 g/dL    HCT 42.8 35.0 - 47.0 %    MCV 92.2 80.0 - 99.0 FL    MCH 28.2 26.0 - 34.0 PG    MCHC 30.6 30.0 - 36.5 g/dL    RDW 15.7 (H) 11.5 - 14.5 %    PLATELET 594 923 - 904 K/uL    MPV 11.8 8.9 - 12.9 FL    NRBC 0.0 0  WBC    ABSOLUTE NRBC 0.00 0.00 - 0.01 K/uL    NEUTROPHILS 43 32 - 75 %    LYMPHOCYTES 46 12 - 49 %    MONOCYTES 7 5 - 13 %    EOSINOPHILS 3 0 - 7 %    BASOPHILS 1 0 - 1 %    IMMATURE GRANULOCYTES 0 0.0 - 0.5 %    ABS. NEUTROPHILS 2.9 1.8 - 8.0 K/UL    ABS. LYMPHOCYTES 3.1 0.8 - 3.5 K/UL    ABS. MONOCYTES 0.5 0.0 - 1.0 K/UL    ABS. EOSINOPHILS 0.2 0.0 - 0.4 K/UL    ABS. BASOPHILS 0.1 0.0 - 0.1 K/UL    ABS. IMM. GRANS. 0.0 0.00 - 0.04 K/UL    DF AUTOMATED     METABOLIC PANEL, COMPREHENSIVE   Result Value Ref Range    Sodium 140 136 - 145 mmol/L    Potassium 4.1 3.5 - 5.1 mmol/L    Chloride 104 97 - 108 mmol/L    CO2 32 21 - 32 mmol/L    Anion gap 4 (L) 5 - 15 mmol/L    Glucose 84 65 - 100 mg/dL    BUN 13 6 - 20 MG/DL    Creatinine 1.55 (H) 0.55 - 1.02 MG/DL    BUN/Creatinine ratio 8 (L) 12 - 20      GFR est AA 43 (L) >60 ml/min/1.73m2    GFR est non-AA 35 (L) >60 ml/min/1.73m2    Calcium 10.0 8.5 - 10.1 MG/DL    Bilirubin, total 0.5 0.2 - 1.0 MG/DL    ALT (SGPT) 39 12 - 78 U/L    AST (SGOT) 51 (H) 15 - 37 U/L    Alk.  phosphatase 68 45 - 117 U/L    Protein, total 7.4 6.4 - 8.2 g/dL    Albumin 4.4 3.5 - 5.0 g/dL    Globulin 3.0 2.0 - 4.0 g/dL    A-G Ratio 1.5 1.1 - 2.2     HEMOGLOBIN A1C WITH EAG   Result Value Ref Range    Hemoglobin A1c 5.9 (H) 4.0 - 5.6 %    Est. average glucose 123 mg/dL   THYROID CASCADE PROFILE   Result Value Ref Range    TSH 59.500 (H) 0.450 - 4.500 uIU/mL   T4, FREE DIRECT   Result Value Ref Range    T4,Free,(Direct) <0.10 (L) 0.82 - 1.77 ng/dL       Assessment/Plan:    ICD-10-CM ICD-9-CM    1. Hypertension, renal disease, stage 1-4 or unspecified chronic kidney disease  I12.9 403.90    2. Hypothyroidism following radioiodine therapy  E89.0 244.1 THYROID CASCADE PROFILE   3. Stage 3 chronic kidney disease, unspecified whether stage 3a or 3b CKD (HCC)  N18.30 585.3    4. Morbid obesity ([facility] [address]  E66.01 278.01    5. LUCINA (obstructive sleep apnea)  G47.33 327.23    6. Prediabetes  R73.03 790.29    7. Anxiety disorder, unspecified type  F41.9 300.00    8. Vitamin D deficiency  E55.9 268.9 VITAMIN D, 25 HYDROXY     Orders Placed This Encounter    THYROID CASCADE PROFILE     Standing Status:   Future     Standing Expiration Date:   10/19/2022    VITAMIN D, 25 HYDROXY     Standing Status:   Future     Standing Expiration Date:   10/19/2022     1. Hypertension, renal disease, stage 1-4 or unspecified chronic kidney disease  Controlled on current regimen    2. Hypothyroidism following radioiodine therapy  Cont levothyroxine  - THYROID CASCADE PROFILE; Future    3. Stage 3 chronic kidney disease, unspecified whether stage 3a or 3b CKD ([facility] [address]  Noted    4. Morbid obesity ([facility] [address]  D/w pt daily walking (at least 20 minutes), healthy diet w fruits and/or veggies w each meal, portion sizes and weight loss      5. LUCINA (obstructive sleep apnea)  Noted  As above    6. Prediabetes  Noted    7. Anxiety disorder, unspecified type  Cont current regimen    8.  Vitamin D deficiency  Check  - VITAMIN D, 25 HYDROXY; Future            There are no Patient Instructions on file for this visit. Follow-up and Dispositions    · Return in about 4 months (around 2/19/2022) for bp f/u. I have reviewed with the patient details of the assessment and plan and all questions were answered. Relevent patient education was performed. The most recent lab findings were reviewed with the patient. An After Visit Summary was printed and given to the patient.

## 2021-10-19 NOTE — PROGRESS NOTES
Chief Complaint   Patient presents with    Hypertension    Other     Hot flash, night sweats       1. Have you been to the ER, urgent care clinic since your last visit? Hospitalized since your last visit? No    2. Have you seen or consulted any other health care providers outside of the [address] [place] [address] since your last visit? Include any pap smears or colon screening.  No

## 2021-10-20 ENCOUNTER — TELEPHONE (OUTPATIENT)
Dept: ENDOCRINOLOGY | Age: 51
End: 2021-10-20

## 2021-10-20 NOTE — TELEPHONE ENCOUNTER
TRELL: Spoke with patient. (there was a Rx request). She states that she does want to see  again for thyroid. She had labs from PCP in July. It said not to restart Liothyronine. See chart. She states that she will be having labs drawn tomorrow from PCP. She has not been taking Liothyronine. Advised to wait until the labs come back to determine if she needs this medication. She states that she had been gaining weight and thought it was from the Liothyronine so she stopped it. Now she is not sure. She has an appt with  02/15/2022 (soonest appt available).

## 2021-10-29 NOTE — TELEPHONE ENCOUNTER
Left a message on cell machine alerting her to the unread [employer] message. Gave support number.

## 2021-11-11 ENCOUNTER — DOCUMENTATION ONLY (OUTPATIENT)
Dept: ENDOCRINOLOGY | Age: 51
End: 2021-11-11

## 2022-02-15 ENCOUNTER — OFFICE VISIT (OUTPATIENT)
Dept: INTERNAL MEDICINE CLINIC | Age: 52
End: 2022-02-15
Payer: MEDICAID

## 2022-02-15 ENCOUNTER — OFFICE VISIT (OUTPATIENT)
Dept: ENDOCRINOLOGY | Age: 52
End: 2022-02-15

## 2022-02-15 VITALS
BODY MASS INDEX: 45.75 KG/M2 | HEIGHT: 64 IN | DIASTOLIC BLOOD PRESSURE: 69 MMHG | HEART RATE: 74 BPM | WEIGHT: 268 LBS | SYSTOLIC BLOOD PRESSURE: 139 MMHG

## 2022-02-15 VITALS
OXYGEN SATURATION: 98 % | TEMPERATURE: 97.9 F | SYSTOLIC BLOOD PRESSURE: 147 MMHG | WEIGHT: 264 LBS | RESPIRATION RATE: 9 BRPM | HEIGHT: 64 IN | HEART RATE: 82 BPM | DIASTOLIC BLOOD PRESSURE: 75 MMHG | BODY MASS INDEX: 45.07 KG/M2

## 2022-02-15 DIAGNOSIS — M15.9 GENERALIZED OA: ICD-10-CM

## 2022-02-15 DIAGNOSIS — M72.2 PLANTAR FASCIITIS: ICD-10-CM

## 2022-02-15 DIAGNOSIS — I12.9 HYPERTENSION, RENAL DISEASE, STAGE 1-4 OR UNSPECIFIED CHRONIC KIDNEY DISEASE: Primary | ICD-10-CM

## 2022-02-15 DIAGNOSIS — E89.0 HYPOTHYROIDISM FOLLOWING RADIOIODINE THERAPY: ICD-10-CM

## 2022-02-15 DIAGNOSIS — Z11.59 ENCOUNTER FOR HEPATITIS C SCREENING TEST FOR LOW RISK PATIENT: ICD-10-CM

## 2022-02-15 DIAGNOSIS — E89.0 HYPOTHYROIDISM FOLLOWING RADIOIODINE THERAPY: Primary | ICD-10-CM

## 2022-02-15 DIAGNOSIS — F41.9 ANXIETY: ICD-10-CM

## 2022-02-15 DIAGNOSIS — R73.03 PREDIABETES: ICD-10-CM

## 2022-02-15 PROCEDURE — 99214 OFFICE O/P EST MOD 30 MIN: CPT | Performed by: INTERNAL MEDICINE

## 2022-02-15 PROCEDURE — 99213 OFFICE O/P EST LOW 20 MIN: CPT | Performed by: INTERNAL MEDICINE

## 2022-02-15 RX ORDER — INDAPAMIDE 2.5 MG/1
TABLET, FILM COATED ORAL
Qty: 180 TABLET | Refills: 2 | Status: SHIPPED | OUTPATIENT
Start: 2022-02-15 | End: 2022-08-16 | Stop reason: SDUPTHER

## 2022-02-15 RX ORDER — BUSPIRONE HYDROCHLORIDE 7.5 MG/1
7.5 TABLET ORAL 3 TIMES DAILY
Qty: 90 TABLET | Refills: 5 | Status: SHIPPED | OUTPATIENT
Start: 2022-02-15 | End: 2022-08-16 | Stop reason: SDUPTHER

## 2022-02-15 RX ORDER — AMLODIPINE BESYLATE 5 MG/1
5 TABLET ORAL DAILY
Qty: 90 TABLET | Refills: 1 | Status: SHIPPED | OUTPATIENT
Start: 2022-02-15 | End: 2022-08-16 | Stop reason: SDUPTHER

## 2022-02-15 RX ORDER — IBUPROFEN 800 MG/1
TABLET ORAL
Qty: 60 TABLET | Refills: 1 | Status: SHIPPED | OUTPATIENT
Start: 2022-02-15 | End: 2022-08-16

## 2022-02-15 RX ORDER — OMEPRAZOLE 40 MG/1
40 CAPSULE, DELAYED RELEASE ORAL DAILY
Qty: 30 CAPSULE | Refills: 2 | Status: SHIPPED | OUTPATIENT
Start: 2022-02-15

## 2022-02-15 NOTE — PROGRESS NOTES
No chief complaint on file. History of Present Illness: Cyn Soares is a 45 y.o. female here for follow up of hypothyroidism secondary to PALAFOX. She has not been back to see me since November 2020. At the time she was not taking her LT4 as instructed and her TSH was very high. We discussed taking here LT4 as directed and she reports that she has been doing this. Pt reports she is taking the LT4 300mcg, every morning, by itself. \"I was out of everything when we made the follow up appointment and I do not recall how long I have been out of the cytomel\". She is not sure how long she has been taking the LT4 again, but my last refill was send in July 2021. Pt notes she has been experiencing hot flashes \"for about 6 months. \"  She also notes that she has had weight gain \"My weight will bounce between 266 pounds and 247 pounds. \"    She denies any recent illnesses, injuries or hospitalizations. She denies issues of CP, SOB, tremors, diarrhea, constipation. She has had issues of hot flashes. She denies issues of dysphagia or dysphonia. Pt continues to have panic attacks, \"I can get them as often as 3 times per day. \"    Pt is post-menopausal since 2005. Pt reports that her eye doctor told her that he felt that she was having issues of pressure in her eye and he thought it could be thyroid related. She is followed by Dr. Betty Wooten. She last saw her eye doctor \"a year ago\". Current Outpatient Medications   Medication Sig    ibuprofen (MOTRIN) 800 mg tablet TAKE 1 TAB BY MOUTH EVERY 8 HOURS AS NEEDED WITH Full meal    indapamide (LOZOL) 2.5 mg tablet TAKE 2 TABLETS BY MOUTH EVERY DAY    omeprazole (PRILOSEC) 40 mg capsule Take 1 Capsule by mouth daily.  amLODIPine (NORVASC) 5 mg tablet Take 1 Tablet by mouth daily.  busPIRone (BUSPAR) 7.5 mg tablet Take 1 Tablet by mouth three (3) times daily.     levothyroxine (SYNTHROID) 300 mcg tablet Take 1 Tablet by mouth Daily (before breakfast). Call office to make a follow up appointment    cyclobenzaprine (FLEXERIL) 10 mg tablet Take 1 Tab by mouth three (3) times daily as needed for Muscle Spasm(s). No current facility-administered medications for this visit. Allergies   Allergen Reactions    Ace Inhibitors Cough    Other Medication Unable to [employer] names, 1 for sinus,1 for bp and 1 for bladder pt states it is called \"sentura\"    Penicillins Rash    Sanctura [Trospium] Other (comments)    Tudorza Pressair [Aclidinium Bromide] Swelling    Vesicare [Solifenacin] Rash and Other (comments)     Review of Systems:  - Cardiovascular: no chest pain  - Neurological: no tremors  - Integumentary: skin is normal    Physical Examination:  Blood pressure 139/69, pulse 74, height 5' 4\" (1.626 m), weight 268 lb (121.6 kg). - General: pleasant, no distress, good eye contact   - Neck: no thyromegaly or thyroid bruits  - Cardiovascular: regular, normal rate, nl s1 and s2, no m/r/g   - Integumentary: skin is normal, no edema  - Neurological: reflexes 2+ at biceps, no tremors  - Psychiatric: normal mood and affect    Data Reviewed:   - none new for review    Assessment/Plan:   1) Hypothyroidism > Pt is clinically euthyroid on LT4 300mcg daily. Will order TFTs and adjust her doses as needed. She will need a 90 day refill once her TFTs are back. Pt voices understanding and agreement with the plan. Pain noted and pt was recommended to call her PCP for further evaluation and treatment, as needed    Pt would prefer a phone call to discuss the results.     RTC 6 months    Copy sent to:  Dr. Jv Alba

## 2022-02-15 NOTE — LETTER
2/15/2022    Patient: Yvonne Cui   YOB: 1970   Date of Visit: 2/15/2022     Lashay Sheppard MD  [address] [place] [id number]  [address] [facility] [address]    Dear Lashay Sheppard MD,      Thank you for referring Ms. Praveena Johns to [facility] for evaluation. My notes for this consultation are attached. If you have questions, please do not hesitate to call me. I look forward to following your patient along with you.       Sincerely,    Antoine Nicholson MD

## 2022-02-15 NOTE — PATIENT INSTRUCTIONS
Plantar Fasciitis: Care Instructions  Overview     Plantar fasciitis is pain and inflammation of the plantar fascia, the tissue at the bottom of your foot that connects the heel bone to the toes. The plantar fascia also supports the arch. If you strain the plantar fascia, it can develop small tears and cause heel pain when you stand or walk. Plantar fasciitis can be caused by running or other sports. It also may occur in people who are overweight or who have high arches or flat feet. You may get plantar fasciitis if you walk or stand for long periods, or have a tight Achilles tendon or calf muscles. You can improve your foot pain with rest and other care at home. It might take a few weeks to a few months for your foot to heal completely. Follow-up care is a key part of your treatment and safety. Be sure to make and go to all appointments, and call your doctor if you are having problems. It's also a good idea to know your test results and keep a list of the medicines you take. How can you care for yourself at home? · Rest your feet often. Reduce your activity to a level that lets you avoid pain. If possible, do not run or walk on hard surfaces. · Take pain medicines exactly as directed. ? If the doctor gave you a prescription medicine for pain, take it as prescribed. ? If you are not taking a prescription pain medicine, take an over-the-counter anti-inflammatory medicine for pain and swelling, such as ibuprofen (Advil, Motrin) or naproxen (Aleve). Read and follow all instructions on the label. · Use ice massage to help with pain and swelling. You can use an ice cube or an ice cup several times a day. To make an ice cup, fill a paper cup with water and freeze it. Cut off the top of the cup until a half-inch of ice shows. Hold onto the remaining paper to use the cup. Rub the ice in small circles over the area for 5 to 7 minutes.   · Contrast baths, which alternate hot and cold water, can also help reduce swelling. But because heat alone may make pain and swelling worse, end a contrast bath with a soak in cold water. · Wear a night splint if your doctor suggests it. A night splint holds your foot with the toes pointed up and the foot and ankle at a 90-degree angle. This position gives the bottom of your foot a constant, gentle stretch. · Do simple exercises such as calf stretches and towel stretches 2 to 3 times each day, especially when you first get up in the morning. These can help the plantar fascia become more flexible. They also make the muscles that support your arch stronger. Hold these stretches for 15 to 30 seconds per stretch. Repeat 2 to 4 times. ? Stand about 1 foot from a wall. Place the palms of both hands against the wall at chest level. Lean forward against the wall, keeping one leg with the knee straight and heel on the ground while bending the knee of the other leg.  ? Sit down on the floor or a mat with your feet stretched in front of you. Roll up a towel lengthwise, and loop it over the ball of your foot. Holding the towel at both ends, gently pull the towel toward you to stretch your foot. · Wear shoes with good arch support. Athletic shoes or shoes with a well-cushioned sole are good choices. · Replace athletic shoes regularly. · Try heel cups or shoe inserts (orthotics) to help cushion your heel. You can buy these at many shoe stores. · Put on your shoes as soon as you get out of bed. Going barefoot or wearing slippers may make your pain worse. · Reach and stay at a good weight for your height. This puts less strain on your feet. When should you call for help? Call your doctor now or seek immediate medical care if:    · You have heel pain with fever, redness, or warmth in your heel.     · You cannot put weight on the sore foot.    Watch closely for changes in your health, and be sure to contact your doctor if:    · You have numbness or tingling in your heel.     · Your heel pain lasts more than 2 weeks. Where can you learn more? Go to http://www.gray.com/  Enter X351 in the search box to learn more about \"Plantar Fasciitis: Care Instructions. \"  Current as of: July 1, 2021               Content Version: 13.0  © 8738-7236 [employer], [employer]. Care instructions adapted under license by [employer] (which disclaims liability or warranty for this information). If you have questions about a medical condition or this instruction, always ask your healthcare professional. [address] any warranty or liability for your use of this information.

## 2022-02-15 NOTE — PROGRESS NOTES
Haven Braxton is a 45 y.o. female and presents with Blood Pressure Check and Foot Pain (x 2 months- denies fall/injury)  . Subjective:    Pt w c/o wei foot pain. Worse first thing in the morning on the plantar aspect. Resolves after some time. Not taking anything. Pt has NOT done her thyroid lab work , as requested by her endocrinologist      PMH-HTN-on indapamide 5mg ONLY     BP Readings from Last 3 Encounters:   02/15/22 139/69   02/15/22 (!) 147/75   10/19/21 139/79        Hypothyroidism-pt is currently on 300mcg levothyroxine. Pt relays she is not taking her cytomel.  Pt relays she takes    in the AM, fasting    -pt  is followed by endocrinology, previously Dr. Kristy Heller     Lab Results   Component Value Date/Time    TSH 59.500 (H) 07/23/2021 01:44 PM    TSH 59.800 (H) 03/12/2021 01:34 PM    T3 Uptake 34 11/06/2019 03:21 PM    T4, Free <0.10 (L) 03/12/2021 01:34 PM    T4, Total 17.1 (H) 11/06/2019 03:21 PM        LUCINA-pt uses CPAP     Vit d def-  Lab Results   Component Value Date/Time    VITAMIN D, 25-HYDROXY 21.5 (L) 04/19/2019 11:56 AM        H/o cigarette smoker- quit 2019      Anxiety-MUCH improved on buspar     Morbid obesity-  Wt Readings from Last 3 Encounters:   02/15/22 268 lb (121.6 kg)   02/15/22 264 lb (119.7 kg)   10/19/21 259 lb (117.5 kg)      Chronic lbp w radiculopathy-pt reports that gabapentin 300mg TID relieves her back pain, but not the neuropathy    -pt declines maxing dose of gabapentin due to SE     -pt declines pain management referral      CKD 3-  Lab Results   Component Value Date/Time    GFR est AA 43 (L) 07/23/2021 01:44 PM    GFR est non-AA 35 (L) 07/23/2021 01:44 PM    Creatinine 1.55 (H) 07/23/2021 01:44 PM    BUN 13 07/23/2021 01:44 PM    Sodium 140 07/23/2021 01:44 PM    Potassium 4.1 07/23/2021 01:44 PM    Chloride 104 07/23/2021 01:44 PM    CO2 32 07/23/2021 01:44 PM         Review of Systems  Constitutional: negative for fevers, chills, anorexia and weight loss  Eyes: negative for visual disturbance and irritation  ENT:   negative for tinnitus,sore throat,nasal congestion,ear pains. hoarseness  Respiratory:  negative for cough, hemoptysis, dyspnea,wheezing  CV:   negative for chest pain, palpitations, lower extremity edema  GI:   negative for nausea, vomiting, diarrhea, abdominal pain,melena  Neurological:  negative for headaches, dizziness, vertigo, memory problems and gait     Past Medical History:   Diagnosis Date    Chronic kidney disease     kidney failure-left    Depression     GERD (gastroesophageal reflux disease)     Grave's disease     Hypertension     Hypothyroidism following radioiodine therapy     2009    Liver disease     Morbid obesity ([address]     Nausea & vomiting     Obstructive sleep apnea (adult) (pediatric) 1/8/2013    Thyroid disease      Past Surgical History:   Procedure Laterality Date    COLONOSCOPY N/A 7/12/2019    COLONOSCOPY performed by Suandra Reed [facility] at [facility] ENDOSCOPY    HX CHOLECYSTECTOMY      HX GYN  2005    novasure    HX ORTHOPAEDIC      Left arm    HX UROLOGICAL      bladder sling     Social History     Socioeconomic History    Marital status:    Tobacco Use    Smoking status: Light Tobacco Smoker     Packs/day: 0.25    Smokeless tobacco: Never Used    Tobacco comment: 2 cigs once a month   Vaping Use    Vaping Use: Never used   Substance and Sexual Activity    Alcohol use: Not Currently     Comment: rare, 1 drink a month    Drug use: No    Sexual activity: Not Currently     Family History   Problem Relation Age of Onset    Hypertension Mother     Emphysema Mother     Hypertension Father     Other Father     Heart Attack Father     Stroke Father     Osteoporosis Father     Diabetes Sister     Hypertension Brother      Current Outpatient Medications   Medication Sig Dispense Refill    ibuprofen (MOTRIN) 800 mg tablet TAKE 1 TAB BY MOUTH EVERY 8 HOURS AS NEEDED WITH Full meal 60 Tablet 1    indapamide (LOZOL) 2.5 mg tablet TAKE 2 TABLETS BY MOUTH EVERY  Tablet 2    omeprazole (PRILOSEC) 40 mg capsule Take 1 Capsule by mouth daily. 30 Capsule 2    amLODIPine (NORVASC) 5 mg tablet Take 1 Tablet by mouth daily. 90 Tablet 1    busPIRone (BUSPAR) 7.5 mg tablet Take 1 Tablet by mouth three (3) times daily. 90 Tablet 5    levothyroxine (SYNTHROID) 300 mcg tablet Take 1 Tablet by mouth Daily (before breakfast). Call office to make a follow up appointment 30 Tablet 1    cyclobenzaprine (FLEXERIL) 10 mg tablet Take 1 Tab by mouth three (3) times daily as needed for Muscle Spasm(s). 30 Tab 1     Allergies   Allergen Reactions    Ace Inhibitors Cough    Other Medication Unable to [employer] names, 1 for sinus,1 for bp and 1 for bladder pt states it is called \"sentura\"    Penicillins Rash    Sanctura [Trospium] Other (comments)    Tudorza Pressair [Aclidinium Bromide] Swelling    Vesicare [Solifenacin] Rash and Other (comments)       Objective:  Visit Vitals  BP (!) 147/75 (BP 1 Location: Left upper arm, BP Patient Position: Sitting, BP Cuff Size: Thigh)   Pulse 82   Temp 97.9 °F (36.6 °C) (Temporal)   Resp 9   Ht 5' 4\" (1.626 m)   Wt 264 lb (119.7 kg)   SpO2 98%   BMI 45.32 kg/m²     Physical Exam:   General appearance - alert, obese, in NAD. Pleasant  Mental status - alert, oriented to person, place, and time  EYE-MAX, EOMI, corneas normal, no foreign bodies  Neck - supple, no significant adenopathy   Chest - clear to auscultation, no wheezes, rales or rhonchi, symmetric air entry   Heart - normal rate, regular rhythm, normal S1, S2, 2/6 systolic murmur  Abdomen - soft, nontender, obese +bs  Skin-Warm and dry.  no hyperpigmentation, vitiligo, or suspicious lesions  Neuro -alert, oriented, normal speech, no focal findings or movement disorder noted      Results for orders placed or performed in visit on 07/23/21   FERRITIN   Result Value Ref Range    Ferritin 84 26 - 388 NG/ML CBC WITH AUTOMATED DIFF   Result Value Ref Range    WBC 6.7 3.6 - 11.0 K/uL    RBC 4.64 3.80 - 5.20 M/uL    HGB 13.1 11.5 - 16.0 g/dL    HCT 42.8 35.0 - 47.0 %    MCV 92.2 80.0 - 99.0 FL    MCH 28.2 26.0 - 34.0 PG    MCHC 30.6 30.0 - 36.5 g/dL    RDW 15.7 (H) 11.5 - 14.5 %    PLATELET 851 210 - 348 K/uL    MPV 11.8 8.9 - 12.9 FL    NRBC 0.0 0  WBC    ABSOLUTE NRBC 0.00 0.00 - 0.01 K/uL    NEUTROPHILS 43 32 - 75 %    LYMPHOCYTES 46 12 - 49 %    MONOCYTES 7 5 - 13 %    EOSINOPHILS 3 0 - 7 %    BASOPHILS 1 0 - 1 %    IMMATURE GRANULOCYTES 0 0.0 - 0.5 %    ABS. NEUTROPHILS 2.9 1.8 - 8.0 K/UL    ABS. LYMPHOCYTES 3.1 0.8 - 3.5 K/UL    ABS. MONOCYTES 0.5 0.0 - 1.0 K/UL    ABS. EOSINOPHILS 0.2 0.0 - 0.4 K/UL    ABS. BASOPHILS 0.1 0.0 - 0.1 K/UL    ABS. IMM. GRANS. 0.0 0.00 - 0.04 K/UL    DF AUTOMATED     METABOLIC PANEL, COMPREHENSIVE   Result Value Ref Range    Sodium 140 136 - 145 mmol/L    Potassium 4.1 3.5 - 5.1 mmol/L    Chloride 104 97 - 108 mmol/L    CO2 32 21 - 32 mmol/L    Anion gap 4 (L) 5 - 15 mmol/L    Glucose 84 65 - 100 mg/dL    BUN 13 6 - 20 MG/DL    Creatinine 1.55 (H) 0.55 - 1.02 MG/DL    BUN/Creatinine ratio 8 (L) 12 - 20      GFR est AA 43 (L) >60 ml/min/1.73m2    GFR est non-AA 35 (L) >60 ml/min/1.73m2    Calcium 10.0 8.5 - 10.1 MG/DL    Bilirubin, total 0.5 0.2 - 1.0 MG/DL    ALT (SGPT) 39 12 - 78 U/L    AST (SGOT) 51 (H) 15 - 37 U/L    Alk. phosphatase 68 45 - 117 U/L    Protein, total 7.4 6.4 - 8.2 g/dL    Albumin 4.4 3.5 - 5.0 g/dL    Globulin 3.0 2.0 - 4.0 g/dL    A-G Ratio 1.5 1.1 - 2.2     HEMOGLOBIN A1C WITH EAG   Result Value Ref Range    Hemoglobin A1c 5.9 (H) 4.0 - 5.6 %    Est. average glucose 123 mg/dL   THYROID CASCADE PROFILE   Result Value Ref Range    TSH 59.500 (H) 0.450 - 4.500 uIU/mL   T4, FREE DIRECT   Result Value Ref Range    T4,Free,(Direct) <0.10 (L) 0.82 - 1.77 ng/dL       Assessment/Plan:    ICD-10-CM ICD-9-CM    1.  Hypertension, renal disease, stage 1-4 or unspecified chronic kidney disease  I12.9 403.90 indapamide (LOZOL) 2.5 mg tablet      amLODIPine (NORVASC) 5 mg tablet   2. Hypothyroidism following radioiodine therapy  E89.0 244.1 T4, FREE      THYROID CASCADE PROFILE      CANCELED: THYROID CASCADE PROFILE      CANCELED: T4, FREE   3. Plantar fasciitis  M72.2 728.71 REFERRAL TO PODIATRY   4. Generalized OA  M15.9 715.00 ibuprofen (MOTRIN) 800 mg tablet   5. Anxiety  F41.9 300.00 busPIRone (BUSPAR) 7.5 mg tablet   6. Prediabetes  R73.03 790.29 HEMOGLOBIN A1C WITH EAG      CANCELED: HEMOGLOBIN A1C WITH EAG   7. Encounter for hepatitis C screening test for low risk patient  Z11.59 V73.89 HEPATITIS C AB, RFLX TO QT BY PCR      CANCELED: HEPATITIS C AB, RFLX TO QT BY PCR     Orders Placed This Encounter    HEPATITIS C AB, RFLX TO QT BY PCR     Standing Status:   Future     Number of Occurrences:   1     Standing Expiration Date:   2/15/2023    HEMOGLOBIN A1C WITH EAG     Standing Status:   Future     Number of Occurrences:   1     Standing Expiration Date:   2/15/2023    T4, FREE     Standing Status:   Future     Number of Occurrences:   1     Standing Expiration Date:   2/15/2023    THYROID CASCADE PROFILE     Standing Status:   Future     Number of Occurrences:   1     Standing Expiration Date:   2/15/2023    REFERRAL TO PODIATRY     Referral Priority:   Routine     Referral Type:   Consultation     Referral Reason:   Specialty Services Required     Referred to Provider:   Michelle Krishnamurthy MD     Requested Specialty:   Podiatry     Number of Visits Requested:   1    ibuprofen (MOTRIN) 800 mg tablet     Sig: TAKE 1 TAB BY MOUTH EVERY 8 HOURS AS NEEDED WITH Full meal     Dispense:  60 Tablet     Refill:  1    indapamide (LOZOL) 2.5 mg tablet     Sig: TAKE 2 TABLETS BY MOUTH EVERY DAY     Dispense:  180 Tablet     Refill:  2    omeprazole (PRILOSEC) 40 mg capsule     Sig: Take 1 Capsule by mouth daily.      Dispense:  30 Capsule     Refill:  2    amLODIPine (NORVASC) 5 mg tablet     Sig: Take 1 Tablet by mouth daily. Dispense:  90 Tablet     Refill:  1    busPIRone (BUSPAR) 7.5 mg tablet     Sig: Take 1 Tablet by mouth three (3) times daily. Dispense:  90 Tablet     Refill:  5     1. Hypertension, renal disease, stage 1-4 or unspecified chronic kidney disease  MUCH improved  - indapamide (LOZOL) 2.5 mg tablet; TAKE 2 TABLETS BY MOUTH EVERY DAY  Dispense: 180 Tablet; Refill: 2  - amLODIPine (NORVASC) 5 mg tablet; Take 1 Tablet by mouth daily. Dispense: 90 Tablet; Refill: 1    2. Hypothyroidism following radioiodine therapy  Encourage pt to complete labs  - T4, FREE; Future  - THYROID CASCADE PROFILE; Future    3. Plantar fasciitis  D/w pt weight loss, insert/supportive shoes and NSAIDS  - REFERRAL TO PODIATRY    4. Generalized OA    - ibuprofen (MOTRIN) 800 mg tablet; TAKE 1 TAB BY MOUTH EVERY 8 HOURS AS NEEDED WITH Full meal  Dispense: 60 Tablet; Refill: 1    5. Anxiety  improved  - busPIRone (BUSPAR) 7.5 mg tablet; Take 1 Tablet by mouth three (3) times daily. Dispense: 90 Tablet; Refill: 5    6. Prediabetes    - HEMOGLOBIN A1C WITH EAG; Future    7. Encounter for hepatitis C screening test for low risk patient    - HEPATITIS C AB, RFLX TO QT BY PCR; Future      Patient Instructions          Plantar Fasciitis: Care Instructions  Overview     Plantar fasciitis is pain and inflammation of the plantar fascia, the tissue at the bottom of your foot that connects the heel bone to the toes. The plantar fascia also supports the arch. If you strain the plantar fascia, it can develop small tears and cause heel pain when you stand or walk. Plantar fasciitis can be caused by running or other sports. It also may occur in people who are overweight or who have high arches or flat feet. You may get plantar fasciitis if you walk or stand for long periods, or have a tight Achilles tendon or calf muscles. You can improve your foot pain with rest and other care at home.  It might take a few weeks to a few months for your foot to heal completely. Follow-up care is a key part of your treatment and safety. Be sure to make and go to all appointments, and call your doctor if you are having problems. It's also a good idea to know your test results and keep a list of the medicines you take. How can you care for yourself at home? · Rest your feet often. Reduce your activity to a level that lets you avoid pain. If possible, do not run or walk on hard surfaces. · Take pain medicines exactly as directed. ? If the doctor gave you a prescription medicine for pain, take it as prescribed. ? If you are not taking a prescription pain medicine, take an over-the-counter anti-inflammatory medicine for pain and swelling, such as ibuprofen (Advil, Motrin) or naproxen (Aleve). Read and follow all instructions on the label. · Use ice massage to help with pain and swelling. You can use an ice cube or an ice cup several times a day. To make an ice cup, fill a paper cup with water and freeze it. Cut off the top of the cup until a half-inch of ice shows. Hold onto the remaining paper to use the cup. Rub the ice in small circles over the area for 5 to 7 minutes. · Contrast baths, which alternate hot and cold water, can also help reduce swelling. But because heat alone may make pain and swelling worse, end a contrast bath with a soak in cold water. · Wear a night splint if your doctor suggests it. A night splint holds your foot with the toes pointed up and the foot and ankle at a 90-degree angle. This position gives the bottom of your foot a constant, gentle stretch. · Do simple exercises such as calf stretches and towel stretches 2 to 3 times each day, especially when you first get up in the morning. These can help the plantar fascia become more flexible. They also make the muscles that support your arch stronger. Hold these stretches for 15 to 30 seconds per stretch. Repeat 2 to 4 times.   ? Stand about 1 foot from a wall. Place the palms of both hands against the wall at chest level. Lean forward against the wall, keeping one leg with the knee straight and heel on the ground while bending the knee of the other leg.  ? Sit down on the floor or a mat with your feet stretched in front of you. Roll up a towel lengthwise, and loop it over the ball of your foot. Holding the towel at both ends, gently pull the towel toward you to stretch your foot. · Wear shoes with good arch support. Athletic shoes or shoes with a well-cushioned sole are good choices. · Replace athletic shoes regularly. · Try heel cups or shoe inserts (orthotics) to help cushion your heel. You can buy these at many shoe stores. · Put on your shoes as soon as you get out of bed. Going barefoot or wearing slippers may make your pain worse. · Reach and stay at a good weight for your height. This puts less strain on your feet. When should you call for help? Call your doctor now or seek immediate medical care if:    · You have heel pain with fever, redness, or warmth in your heel.     · You cannot put weight on the sore foot. Watch closely for changes in your health, and be sure to contact your doctor if:    · You have numbness or tingling in your heel.     · Your heel pain lasts more than 2 weeks. Where can you learn more? Go to http://www.gray.com/  Enter X351 in the search box to learn more about \"Plantar Fasciitis: Care Instructions. \"  Current as of: July 1, 2021               Content Version: 13.0  © 2006-2021 [employer]. Care instructions adapted under license by [employer] (which disclaims liability or warranty for this information). If you have questions about a medical condition or this instruction, always ask your healthcare professional. [place] [address] any warranty or liability for your use of this information.          Follow-up and Dispositions    · Return in about 6 months (around 8/15/2022) for bp check. I have reviewed with the patient details of the assessment and plan and all questions were answered. Relevent patient education was performed. The most recent lab findings were reviewed with the patient. An After Visit Summary was printed and given to the patient.

## 2022-02-15 NOTE — PROGRESS NOTES
Chief Complaint   Patient presents with    Blood Pressure Check    Toe Pain     x 2 months- denies fall/injury     1. Have you been to the ER, urgent care clinic since your last visit? Hospitalized since your last visit? No    2. Have you seen or consulted any other health care providers outside of the [address] [place] [address] since your last visit? Include any pap smears or colon screening.  No

## 2022-02-18 DIAGNOSIS — E89.0 HYPOTHYROIDISM FOLLOWING RADIOIODINE THERAPY: Primary | ICD-10-CM

## 2022-03-18 PROBLEM — I12.9 HYPERTENSION, RENAL DISEASE, STAGE 1-4 OR UNSPECIFIED CHRONIC KIDNEY DISEASE: Status: ACTIVE | Noted: 2019-03-08

## 2022-03-18 PROBLEM — F41.9 ANXIETY DISORDER: Status: ACTIVE | Noted: 2019-08-05

## 2022-03-19 PROBLEM — E55.9 VITAMIN D DEFICIENCY: Status: ACTIVE | Noted: 2019-03-08

## 2022-03-19 PROBLEM — D64.9 ANEMIA, NORMOCYTIC NORMOCHROMIC: Status: ACTIVE | Noted: 2019-03-11

## 2022-03-20 PROBLEM — M15.9 GENERALIZED OA: Status: ACTIVE | Noted: 2019-03-11

## 2022-03-20 PROBLEM — R73.03 PREDIABETES: Status: ACTIVE | Noted: 2021-07-26

## 2022-04-15 DIAGNOSIS — E89.0 HYPOTHYROIDISM FOLLOWING RADIOIODINE THERAPY: ICD-10-CM

## 2022-04-20 ENCOUNTER — NURSE TRIAGE (OUTPATIENT)
Dept: OTHER | Facility: CLINIC | Age: 52
End: 2022-04-20

## 2022-04-20 ENCOUNTER — OFFICE VISIT (OUTPATIENT)
Dept: INTERNAL MEDICINE CLINIC | Age: 52
End: 2022-04-20
Payer: MEDICAID

## 2022-04-20 VITALS
HEIGHT: 64 IN | BODY MASS INDEX: 46.1 KG/M2 | DIASTOLIC BLOOD PRESSURE: 63 MMHG | HEART RATE: 79 BPM | OXYGEN SATURATION: 98 % | SYSTOLIC BLOOD PRESSURE: 146 MMHG | TEMPERATURE: 97.8 F | RESPIRATION RATE: 19 BRPM | WEIGHT: 270 LBS

## 2022-04-20 DIAGNOSIS — R42 DIZZINESS: Primary | ICD-10-CM

## 2022-04-20 DIAGNOSIS — I12.9 HYPERTENSION, RENAL DISEASE, STAGE 1-4 OR UNSPECIFIED CHRONIC KIDNEY DISEASE: ICD-10-CM

## 2022-04-20 PROCEDURE — 99214 OFFICE O/P EST MOD 30 MIN: CPT | Performed by: INTERNAL MEDICINE

## 2022-04-20 RX ORDER — MECLIZINE HYDROCHLORIDE 25 MG/1
25 TABLET ORAL
Qty: 30 TABLET | Refills: 1 | Status: SHIPPED | OUTPATIENT
Start: 2022-04-20 | End: 2022-04-30

## 2022-04-20 NOTE — PROGRESS NOTES
Chief Complaint   Patient presents with    Hypertension    Headache    Dizziness     first time on the 4/4 after tooth extraction, happened agian on Saturday \"comes and goes in waves\"    Nausea     1. Have you been to the ER, urgent care clinic since your last visit? Hospitalized since your last visit? No    2. Have you seen or consulted any other health care providers outside of the [address] [place] [address] since your last visit? Include any pap smears or colon screening.  No

## 2022-04-20 NOTE — TELEPHONE ENCOUNTER
Received call from Erica at [facility] with [employer]. Subjective: Caller states \"states high bp issues\"     Current Symptoms: nausea, dizzy, hypertension, headache    Onset: 5 days ago; gradual, worsening    Associated Symptoms: NA    Pain Severity: 8/10; sharp; intermittent    Temperature: denies     What has been tried: advil    LMP: NA Pregnant: NA    Recommended disposition: See in Office Today    Care advice provided, patient verbalizes understanding; denies any other questions or concerns; instructed to call back for any new or worsening symptoms. Patient/Caller agrees with recommended disposition; writer provided warm transfer to [facility] for appointment scheduling    Attention Provider: Thank you for allowing me to participate in the care of your patient. The patient was connected to triage in response to information provided to the [facility]. Please do not respond through this encounter as the response is not directed to a shared pool.       Reason for Disposition   Systolic BP >= 684 OR Diastolic >= 767    Protocols used: BLOOD PRESSURE - HIGH-ADULT-OH

## 2022-04-20 NOTE — PROGRESS NOTES
Ian Rodriguez is a 45 y.o. female and presents with Hypertension, Headache, Dizziness (first time on the 4/4 after tooth extraction, happened agian on Saturday \"comes and goes in waves\"), and Nausea  . Subjective:    Pt was in her USOH until after she had a tooth extracted four days ago. She almost immediately developed dizziness upon sitting up. Her sxs are reproducible w head mvmnt. . Improves w steadying  Her head. Her sxs have lessened over time. No vomiting/headache. PMH-HTN-on indapamide 5mg ONLY     BP Readings from Last 3 Encounters:   04/20/22 (!) 146/63   02/15/22 139/69   02/15/22 (!) 147/75        Hypothyroidism-pt is currently on 300mcg levothyroxine 6/7 days.      -pt  is followed by endocrinology, previously Dr. Kimmie Cruz     Lab Results   Component Value Date/Time    TSH 0.036 (L) 02/15/2022 11:10 AM    TSH 59.800 (H) 03/12/2021 01:34 PM    T3 Uptake 34 11/06/2019 03:21 PM    T4, Free 2.9 (H) 02/15/2022 11:10 AM    T4, Total 17.1 (H) 11/06/2019 03:21 PM        LUCINA-pt uses CPAP     Vit d def-  Lab Results   Component Value Date/Time    VITAMIN D, 25-HYDROXY 21.5 (L) 04/19/2019 11:56 AM        H/o cigarette smoker- quit 2019      Anxiety-MUCH improved on buspar     Morbid obesity-  Wt Readings from Last 3 Encounters:   04/20/22 270 lb (122.5 kg)   02/15/22 268 lb (121.6 kg)   02/15/22 264 lb (119.7 kg)      Chronic lbp w radiculopathy-pt reports that gabapentin 300mg TID relieves her back pain, but not the neuropathy    -pt declines maxing dose of gabapentin due to SE     -pt declines pain management referral      CKD 3-  Lab Results   Component Value Date/Time    GFR est AA 43 (L) 07/23/2021 01:44 PM    GFR est non-AA 35 (L) 07/23/2021 01:44 PM    Creatinine 1.55 (H) 07/23/2021 01:44 PM    BUN 13 07/23/2021 01:44 PM    Sodium 140 07/23/2021 01:44 PM    Potassium 4.1 07/23/2021 01:44 PM    Chloride 104 07/23/2021 01:44 PM    CO2 32 07/23/2021 01:44 PM         Review of Systems  Constitutional: negative for fevers, chills, anorexia and weight loss  Eyes:   negative for visual disturbance and irritation  ENT:   negative for tinnitus,sore throat,nasal congestion,ear pains. hoarseness  Respiratory:  negative for cough, hemoptysis, dyspnea,wheezing  CV:   negative for chest pain, palpitations, lower extremity edema  GI:   negative for nausea, vomiting, diarrhea, abdominal pain,melena  Neurological:  negative for headaches, dizziness, vertigo, memory problems and gait     Past Medical History:   Diagnosis Date    Chronic kidney disease     kidney failure-left    Depression     GERD (gastroesophageal reflux disease)     Grave's disease     Hypertension     Hypothyroidism following radioiodine therapy     2009    Liver disease     Morbid obesity ([facility] [place] [address]     Nausea & vomiting     Obstructive sleep apnea (adult) (pediatric) 1/8/2013    Thyroid disease      Past Surgical History:   Procedure Laterality Date    COLONOSCOPY N/A 7/12/2019    COL[facility] performed [facility]leg[facility]COPY    HX CHOLECYSTECTOMY      HX GYN  2005    novasure    HX ORTHOPAEDIC      Left arm    HX UROLOGICAL      bladder sling     Social History     Socioeconomic History    Marital status:    Tobacco Use    Smoking status: Light Tobacco Smoker     Packs/day: 0.25    Smokeless tobacco: Never Used    Tobacco comment: 2 cigs once a month   Vaping Use    Vaping Use: Never used   Substance and Sexual Activity    Alcohol use: Not Currently     Comment: rare, 1 drink a month    Drug use: No    Sexual activity: Not Currently     Family History   Problem Relation Age of Onset    Hypertension Mother     Emphysema Mother     Hypertension Father     Other Father     Heart Attack Father     Stroke Father     Osteoporosis Father     Diabetes Sister     Hypertension Brother      Current Outpatient Medications   Medication Sig Dispense Refill    meclizine (ANTIVERT) 25 mg tablet Take 1 Tablet by mouth three (3) times daily as needed for Dizziness for up to 10 days. 30 Tablet 1    ibuprofen (MOTRIN) 800 mg tablet TAKE 1 TAB BY MOUTH EVERY 8 HOURS AS NEEDED WITH Full meal 60 Tablet 1    indapamide (LOZOL) 2.5 mg tablet TAKE 2 TABLETS BY MOUTH EVERY  Tablet 2    omeprazole (PRILOSEC) 40 mg capsule Take 1 Capsule by mouth daily. 30 Capsule 2    amLODIPine (NORVASC) 5 mg tablet Take 1 Tablet by mouth daily. 90 Tablet 1    busPIRone (BUSPAR) 7.5 mg tablet Take 1 Tablet by mouth three (3) times daily. 90 Tablet 5    levothyroxine (SYNTHROID) 300 mcg tablet Take 1 Tablet by mouth Daily (before breakfast). Call office to make a follow up appointment 30 Tablet 1    cyclobenzaprine (FLEXERIL) 10 mg tablet Take 1 Tab by mouth three (3) times daily as needed for Muscle Spasm(s). 30 Tab 1     Allergies   Allergen Reactions    Ace Inhibitors Cough    Other Medication Unable to [employer] names, 1 for sinus,1 for bp and 1 for bladder pt states it is called \"sentura\"    Penicillins Rash    Sanctura [Trospium] Other (comments)    Tudorza Pressair [Aclidinium Bromide] Swelling    Vesicare [Solifenacin] Rash and Other (comments)       Objective:  Visit Vitals  BP (!) 146/63 (BP 1 Location: Left upper arm, BP Patient Position: Sitting, BP Cuff Size: Thigh)   Pulse 79   Temp 97.8 °F (36.6 °C) (Temporal)   Resp 19   Ht 5' 4\" (1.626 m)   Wt 270 lb (122.5 kg)   SpO2 98%   BMI 46.35 kg/m²     Physical Exam:   General appearance - alert, obese, in NAD. Pleasant  Mental status - alert, oriented to person, place, and time  EYE-MAX, EOMI, corneas normal, no foreign bodies  Neck - supple, no significant adenopathy   Chest - clear to auscultation, no wheezes, rales or rhonchi, symmetric air entry   Heart - normal rate, regular rhythm, normal S1, S2, 2/6 systolic murmur  Abdomen - soft, nontender, obese +bs  Skin-Warm and dry.  no hyperpigmentation, vitiligo, or suspicious lesions  Neuro -alert, oriented, normal speech, no focal findings or movement disorder noted      Results for orders placed or performed in visit on 02/15/22   THYROID CASCADE PROFILE   Result Value Ref Range    TSH 0.036 (L) 0.450 - 4.500 uIU/mL   T4, FREE   Result Value Ref Range    T4, Free 2.9 (H) 0.8 - 1.5 NG/DL   HEMOGLOBIN A1C WITH EAG   Result Value Ref Range    Hemoglobin A1c 5.7 (H) 4.0 - 5.6 %    Est. average glucose 117 mg/dL   HEPATITIS C AB, RFLX TO QT BY PCR   Result Value Ref Range    HCV Ab <0.1 0.0 - 0.9 s/co ratio   HCV INTERPRETATION   Result Value Ref Range    HCV Interpretation Comment     T4, FREE DIRECT   Result Value Ref Range    T4,Free,(Direct) 4.37 (H) 0.82 - 1.77 ng/dL       Assessment/Plan:    ICD-10-CM ICD-9-CM    1. Dizziness  R42 780.4 meclizine (ANTIVERT) 25 mg tablet   2. Hypertension, renal disease, stage 1-4 or unspecified chronic kidney disease  I12.9 403.90      Orders Placed This Encounter    meclizine (ANTIVERT) 25 mg tablet     Sig: Take 1 Tablet by mouth three (3) times daily as needed for Dizziness for up to 10 days. Dispense:  30 Tablet     Refill:  1     1. Dizziness  BPV vs intracranial pathology  Go to ED if sxs worsen to r/o metabolic/intracranial etiology  D/w pt to research maneuvers to aid resolution of sxs  - meclizine (ANTIVERT) 25 mg tablet; Take 1 Tablet by mouth three (3) times daily as needed for Dizziness for up to 10 days. Dispense: 30 Tablet; Refill: 1    2. Hypertension, renal disease, stage 1-4 or unspecified chronic kidney disease  NOT elevated today      There are no Patient Instructions on file for this visit. I have reviewed with the patient details of the assessment and plan and all questions were answered. Relevent patient education was performed. The most recent lab findings were reviewed with the patient. An After Visit Summary was printed and given to the patient.

## 2022-08-16 ENCOUNTER — OFFICE VISIT (OUTPATIENT)
Dept: ENDOCRINOLOGY | Age: 52
End: 2022-08-16

## 2022-08-16 ENCOUNTER — OFFICE VISIT (OUTPATIENT)
Dept: INTERNAL MEDICINE CLINIC | Age: 52
End: 2022-08-16
Payer: MEDICAID

## 2022-08-16 VITALS
BODY MASS INDEX: 48.01 KG/M2 | SYSTOLIC BLOOD PRESSURE: 124 MMHG | WEIGHT: 281.2 LBS | DIASTOLIC BLOOD PRESSURE: 61 MMHG | HEART RATE: 66 BPM | HEIGHT: 64 IN

## 2022-08-16 VITALS
DIASTOLIC BLOOD PRESSURE: 88 MMHG | BODY MASS INDEX: 47.89 KG/M2 | SYSTOLIC BLOOD PRESSURE: 132 MMHG | HEIGHT: 64 IN | OXYGEN SATURATION: 98 % | HEART RATE: 84 BPM | WEIGHT: 280.5 LBS | TEMPERATURE: 98.4 F | RESPIRATION RATE: 16 BRPM

## 2022-08-16 DIAGNOSIS — G47.33 OSA (OBSTRUCTIVE SLEEP APNEA): ICD-10-CM

## 2022-08-16 DIAGNOSIS — M15.9 GENERALIZED OA: ICD-10-CM

## 2022-08-16 DIAGNOSIS — E89.0 HYPOTHYROIDISM FOLLOWING RADIOIODINE THERAPY: ICD-10-CM

## 2022-08-16 DIAGNOSIS — F41.9 ANXIETY: ICD-10-CM

## 2022-08-16 DIAGNOSIS — E89.0 HYPOTHYROIDISM FOLLOWING RADIOIODINE THERAPY: Primary | ICD-10-CM

## 2022-08-16 DIAGNOSIS — N18.30 STAGE 3 CHRONIC KIDNEY DISEASE, UNSPECIFIED WHETHER STAGE 3A OR 3B CKD (HCC): ICD-10-CM

## 2022-08-16 DIAGNOSIS — E66.01 MORBID OBESITY (HCC): ICD-10-CM

## 2022-08-16 DIAGNOSIS — R73.03 PREDIABETES: ICD-10-CM

## 2022-08-16 DIAGNOSIS — N64.4 BREAST TENDERNESS IN FEMALE: ICD-10-CM

## 2022-08-16 DIAGNOSIS — I12.9 HYPERTENSION, RENAL DISEASE, STAGE 1-4 OR UNSPECIFIED CHRONIC KIDNEY DISEASE: Primary | ICD-10-CM

## 2022-08-16 DIAGNOSIS — M54.16 LUMBAR RADICULOPATHY: ICD-10-CM

## 2022-08-16 DIAGNOSIS — Z12.31 BREAST CANCER SCREENING BY MAMMOGRAM: ICD-10-CM

## 2022-08-16 PROCEDURE — 99214 OFFICE O/P EST MOD 30 MIN: CPT | Performed by: INTERNAL MEDICINE

## 2022-08-16 PROCEDURE — 99215 OFFICE O/P EST HI 40 MIN: CPT | Performed by: INTERNAL MEDICINE

## 2022-08-16 RX ORDER — CYCLOBENZAPRINE HCL 10 MG
10 TABLET ORAL
Qty: 30 TABLET | Refills: 1 | Status: SHIPPED | OUTPATIENT
Start: 2022-08-16

## 2022-08-16 RX ORDER — INDAPAMIDE 2.5 MG/1
TABLET, FILM COATED ORAL
Qty: 180 TABLET | Refills: 2 | Status: SHIPPED | OUTPATIENT
Start: 2022-08-16

## 2022-08-16 RX ORDER — AMLODIPINE BESYLATE 5 MG/1
5 TABLET ORAL DAILY
Qty: 90 TABLET | Refills: 1 | Status: SHIPPED | OUTPATIENT
Start: 2022-08-16

## 2022-08-16 RX ORDER — BUSPIRONE HYDROCHLORIDE 7.5 MG/1
7.5 TABLET ORAL 3 TIMES DAILY
Qty: 90 TABLET | Refills: 5 | Status: SHIPPED | OUTPATIENT
Start: 2022-08-16

## 2022-08-16 RX ORDER — MECLIZINE HYDROCHLORIDE 25 MG/1
TABLET ORAL
COMMUNITY
Start: 2022-07-04

## 2022-08-16 NOTE — PROGRESS NOTES
Chief Complaint   Patient presents with    Thyroid Problem     Pcp and pharmacy verified     History of Present Illness: Cali Velázquez is a 47 y.o. female here for follow up of hypothyroidism secondary to PALAFOX. Pt denies any recent illnesses, injuries or hospitalizations. Pt notes that she has gained about 15 pounds over the last year. \"My weight just seems to keep creeping up. \"  Her weight today was 281 pounds. Pt reports she is taking the LT4 300mcg, every morning, by itself. Pt notes she is no longer having issues of hot flashes. been experiencing hot flashes \"for about 6 months. \"    She denies issues of CP, SOB, tremors, diarrhea, constipation. She denies issues of dysphagia or dysphonia. Pt continues to have panic attacks, \"I can get them as often as 2 times per day, but I can go for weeks with nothing. \"    Pt is post-menopausal since 2005. Pt reports that her eye doctor told her that he felt that she was having issues of pressure in her eye and he thought it could be thyroid related. She is followed by Dr. Ольга Becerra. She last saw her eye doctor \"it was sometime this year, but I don't recall exactly when. \"      Current Outpatient Medications   Medication Sig    meclizine (ANTIVERT) 25 mg tablet TAKE 1 TABLET BY MOUTH THREE (3) TIMES DAILY AS NEEDED FOR DIZZINESS FOR UP TO 10 DAYS.    indapamide (LOZOL) 2.5 mg tablet TAKE 2 TABLETS BY MOUTH EVERY DAY    omeprazole (PRILOSEC) 40 mg capsule Take 1 Capsule by mouth daily. amLODIPine (NORVASC) 5 mg tablet Take 1 Tablet by mouth daily. busPIRone (BUSPAR) 7.5 mg tablet Take 1 Tablet by mouth three (3) times daily. levothyroxine (SYNTHROID) 300 mcg tablet Take 1 Tablet by mouth Daily (before breakfast). Call office to make a follow up appointment    cyclobenzaprine (FLEXERIL) 10 mg tablet Take 1 Tab by mouth three (3) times daily as needed for Muscle Spasm(s). No current facility-administered medications for this visit. Allergies   Allergen Reactions    Ace Inhibitors Cough    Other Medication Unable to [employer] names, 1 for sinus,1 for bp and 1 for bladder pt states it is called \"sentura\"    Penicillins Rash    Sanctura [Trospium] Other (comments)    Ni Bora [Aclidinium Bromide] Swelling    Vesicare [Solifenacin] Rash and Other (comments)     Review of Systems:  - Cardiovascular: no chest pain  - Neurological: no tremors  - Integumentary: skin is normal    Physical Examination:  Blood pressure 124/61, pulse 66, height 5' 4\" (1.626 m), weight 281 lb 3.2 oz (127.6 kg). General: pleasant, no distress, good eye contact   Neck: no thyromegaly or thyroid bruits, no supraclavicular or dorso-cervical fat padding  Cardiovascular: regular, normal rate, nl s1 and s2, no m/r/g   Integumentary: skin is normal, no edema  Neurological: reflexes 2+ at biceps, no tremors  Psychiatric: normal mood and affect    Data Reviewed:   - none new for review    Assessment/Plan:   1) Hypothyroidism > Pt is clinically euthyroid on LT4 300mcg daily. Will order TFTs and adjust her doses as needed. She will need a 90 day refill once her TFTs are back. 2) Obesity > If her TFTs are normal we discussed testing her for other endocrine causes of obesity. We will order an overnight dexamethasone suppression test.    Pt voices understanding and agreement with the plan. Pain noted and pt was recommended to call her PCP for further evaluation and treatment, as needed    Pt would prefer a phone call to discuss the results.     RTC 4 months    Copy sent to:  Dr. Court Lundborg

## 2022-08-16 NOTE — LETTER
8/16/2022    Patient: Terrence Rousseau   YOB: 1970   Date of Visit: 8/16/2022     Meghann Cruz MD  [facility] [id number]  [address] [place]    Dear Meghann Cruz MD,      Thank you for referring Ms. Kristina Kunz to [facility] for evaluation. My notes for this consultation are attached. If you have questions, please do not hesitate to call me. I look forward to following your patient along with you.       Sincerely,    Melvin Byrd MD

## 2022-08-16 NOTE — PROGRESS NOTES
Room: 1    Identified pt with two pt identifiers(name and ). Reviewed record in preparation for visit and have obtained necessary documentation. All patient medications has been reviewed. Chief Complaint   Patient presents with    Follow-up     6 month    Hypertension    Breast pain     X1 week; painful with pressure; tender/sore       Health Maintenance Due   Topic    DTaP/Tdap/Td series (1 - Tdap)    Shingrix Vaccine Age 50> (1 of 2)       Vitals:    22 1007   BP: 132/88   Pulse: 84   Resp: 16   Temp: 98.4 °F (36.9 °C)   TempSrc: Temporal   SpO2: 98%   Weight: 280 lb 8 oz (127.2 kg)   Height: 5' 4\" (1.626 m)   PainSc:   8   PainLoc: Breast       4. Have you been to the ER, urgent care clinic since your last visit? Hospitalized since your last visit? No    5. Have you seen or consulted any other health care providers outside of the [address] [place] [address] since your last visit? Include any pap smears or colon screening. No    Patient is accompanied by self I have received verbal consent from Oumar Prescott to discuss any/all medical information while they are present in the room.

## 2022-08-16 NOTE — PROGRESS NOTES
Carline Vaughan is a 47 y.o. female and presents with Follow-up (6 month), Hypertension, and Breast pain (X1 week; painful with pressure; tender/sore)  . Subjective:    Pt w/ c/o wei breast tenderness x 1 week    Pt has not done her thyroid lab test and has an appt w endo today. Pt has gained ~12lbs/6 mths, which she is attributing to her thyroid. FYI, pts TSH has been suppressed of late. PMH-HTN-on indapamide 5mg ONLY     BP Readings from Last 3 Encounters:   08/16/22 132/88   04/20/22 (!) 146/63   02/15/22 139/69        Hypothyroidism-pt is currently on 300mcg levothyroxine 6/7 days.      -pt  is followed by endocrinology     Lab Results   Component Value Date/Time    TSH 0.036 (L) 02/15/2022 11:10 AM    TSH 59.800 (H) 03/12/2021 01:34 PM    T3 Uptake 34 11/06/2019 03:21 PM    T4, Free 2.9 (H) 02/15/2022 11:10 AM    T4, Total 17.1 (H) 11/06/2019 03:21 PM        LUCINA-pt uses CPAP     Vit d def-  Lab Results   Component Value Date/Time    VITAMIN D, 25-HYDROXY 21.5 (L) 04/19/2019 11:56 AM        H/o cigarette smoker- quit 2019      Anxiety-MUCH improved on buspar     Morbid obesity-  Wt Readings from Last 3 Encounters:   08/16/22 280 lb 8 oz (127.2 kg)   04/20/22 270 lb (122.5 kg)   02/15/22 268 lb (121.6 kg)      Chronic lbp w radiculopathy-pt reports that gabapentin 300mg TID relieves her back pain, but not the neuropathy    -pt declines maxing dose of gabapentin due to SE     -pt declines pain management referral      CKD 3-  Lab Results   Component Value Date/Time    GFR est AA 43 (L) 07/23/2021 01:44 PM    GFR est non-AA 35 (L) 07/23/2021 01:44 PM    Creatinine 1.55 (H) 07/23/2021 01:44 PM    BUN 13 07/23/2021 01:44 PM    Sodium 140 07/23/2021 01:44 PM    Potassium 4.1 07/23/2021 01:44 PM    Chloride 104 07/23/2021 01:44 PM    CO2 32 07/23/2021 01:44 PM         Review of Systems  Constitutional: negative for fevers, chills, anorexia and weight loss  Eyes:   negative for visual disturbance and irritation  ENT:   negative for tinnitus,sore throat,nasal congestion,ear pains. hoarseness  Respiratory:  negative for cough, hemoptysis, dyspnea,wheezing  CV:   negative for chest pain, palpitations, lower extremity edema  GI:   negative for nausea, vomiting, diarrhea, abdominal pain,melena  Neurological:  negative for headaches, dizziness, vertigo, memory problems and gait     Past Medical History:   Diagnosis Date    Chronic kidney disease     kidney failure-left    Depression     GERD (gastroesophageal reflux disease)     Grave's disease     Hypertension     Hypothyroidism following radioiodine therapy     2009    Liver disease     Morbid obesity ([address]     Nausea & vomiting     Obstructive sleep apnea (adult) (pediatric) 1/8/2013    Thyroid disease      Past Surgical History:   Procedure Laterality Date    COLONOSCOPY N/A 7/12/2019    COLONOSCOPY performed by Trace Corey MD at [facility] ENDOSCOPY    HX CHOLECYSTECTOMY      HX GYN  2005    novasure    HX ORTHOPAEDIC      Left arm    HX UROLOGICAL      bladder sling     Social History     Socioeconomic History    Marital status:    Tobacco Use    Smoking status: Light Smoker     Packs/day: 0.25     Types: Cigarettes    Smokeless tobacco: Never    Tobacco comments:     2 cigs once a month   Vaping Use    Vaping Use: Never used   Substance and Sexual Activity    Alcohol use: Not Currently     Comment: rare, 1 drink a month    Drug use: No    Sexual activity: Not Currently     Family History   Problem Relation Age of Onset    Hypertension Mother     Emphysema Mother     Hypertension Father     Other Father     Heart Attack Father     Stroke Father     Osteoporosis Father     Diabetes Sister     Hypertension Brother      Current Outpatient Medications   Medication Sig Dispense Refill    indapamide (LOZOL) 2.5 mg tablet TAKE 2 TABLETS BY MOUTH EVERY  Tablet 2    omeprazole (PRILOSEC) 40 mg capsule Take 1 Capsule by mouth daily.  30 Capsule 2 amLODIPine (NORVASC) 5 mg tablet Take 1 Tablet by mouth daily. 90 Tablet 1    busPIRone (BUSPAR) 7.5 mg tablet Take 1 Tablet by mouth three (3) times daily. 90 Tablet 5    levothyroxine (SYNTHROID) 300 mcg tablet Take 1 Tablet by mouth Daily (before breakfast). Call office to make a follow up appointment 30 Tablet 1    cyclobenzaprine (FLEXERIL) 10 mg tablet Take 1 Tab by mouth three (3) times daily as needed for Muscle Spasm(s). 30 Tab 1    ibuprofen (MOTRIN) 800 mg tablet TAKE 1 TAB BY MOUTH EVERY 8 HOURS AS NEEDED WITH Full meal (Patient not taking: Reported on 8/16/2022) 60 Tablet 1     Allergies   Allergen Reactions    Ace Inhibitors Cough    Other Medication Unable to [employer] names, 1 for sinus,1 for bp and 1 for bladder pt states it is called \"sentura\"    Penicillins Rash    Sanctura [Trospium] Other (comments)    Ni Bora [Aclidinium Bromide] Swelling    Vesicare [Solifenacin] Rash and Other (comments)       Objective:  Visit Vitals  /88 (BP 1 Location: Left arm, BP Patient Position: Sitting)   Pulse 84   Temp 98.4 °F (36.9 °C) (Temporal)   Resp 16   Ht 5' 4\" (1.626 m)   Wt 280 lb 8 oz (127.2 kg)   SpO2 98%   BMI 48.15 kg/m²     Physical Exam:   General appearance - alert, obese, in NAD. Mental status - alert, oriented to person, place, and time  EYE-MAX, EOMI, corneas normal, no foreign bodies  Neck - supple, no significant adenopathy   Breast- wei breast w/o discrete masses, no architectural distortion. No rashes or nipple discharge  Chest - clear to auscultation, no wheezes, rales or rhonchi, symmetric air entry   Heart - normal rate, regular rhythm, normal S1, S2, 2/6 systolic murmur  Abdomen - soft, nontender, obese +bs  Skin-Warm and dry.  no hyperpigmentation, vitiligo, or suspicious lesions  Neuro -alert, oriented, normal speech, no focal findings or movement disorder noted      Results for orders placed or performed in visit on 02/15/22   THYROID CASCADE PROFILE Result Value Ref Range    TSH 0.036 (L) 0.450 - 4.500 uIU/mL   T4, FREE   Result Value Ref Range    T4, Free 2.9 (H) 0.8 - 1.5 NG/DL   HEMOGLOBIN A1C WITH EAG   Result Value Ref Range    Hemoglobin A1c 5.7 (H) 4.0 - 5.6 %    Est. average glucose 117 mg/dL   HEPATITIS C AB, RFLX TO QT BY PCR   Result Value Ref Range    HCV Ab <0.1 0.0 - 0.9 s/co ratio   HCV INTERPRETATION   Result Value Ref Range    HCV Interpretation Comment     T4, FREE DIRECT   Result Value Ref Range    T4,Free,(Direct) 4.37 (H) 0.82 - 1.77 ng/dL       Assessment/Plan:    ICD-10-CM ICD-9-CM    1. Breast cancer screening by mammogram  Z12.31 V76.12 [facility] MAMMO BI SCREENING INCL CAD      2. Hypertension, renal disease, stage 1-4 or unspecified chronic kidney disease  I12.9 403.90       3. Anxiety  F41.9 300.00       4. Lumbar radiculopathy  M54.16 724.4         Orders Placed This Encounter    [facility] MAMMO BI SCREENING INCL CAD     Standing Status:   Future     Standing Expiration Date:   9/16/2023       1. Hypertension, renal disease, stage 1-4 or unspecified chronic kidney disease  Controlled  - amLODIPine (NORVASC) 5 mg tablet; Take 1 Tablet by mouth in the morning. Dispense: 90 Tablet; Refill: 1  - indapamide (LOZOL) 2.5 mg tablet; TAKE 2 TABLETS BY MOUTH EVERY DAY  Dispense: 180 Tablet; Refill: 2    2. Hypothyroidism following radioiodine therapy  F/up endo    3. Stage 3 chronic kidney disease, unspecified whether stage 3a or 3b CKD ([facility] [address]  Noted    4. Breast tenderness in female  Pt info re:breast tenderness given to patient    5. Anxiety    - busPIRone (BUSPAR) 7.5 mg tablet; Take 1 Tablet by mouth three (3) times daily. Dispense: 90 Tablet; Refill: 5    6. Morbid obesity ([facility] [address]  D/w pt daily walking (at least 20 minutes), healthy diet w fruits and/or veggies w each meal, portion sizes and weight loss    7. LUCINA (obstructive sleep apnea)  Noted    8. Generalized OA  Noted    9. Prediabetes  Noted    10.  Lumbar radiculopathy  Noted  - cyclobenzaprine (FLEXERIL) 10 mg tablet; Take 1 Tablet by mouth three (3) times daily as needed for Muscle Spasm(s). Dispense: 30 Tablet; Refill: 1    11. Breast cancer screening by mammogram  [facility]  - [facility] MAMMO BI SCREENING INCL CAD; Future      There are no Patient Instructions on file for this visit. Follow-up and Dispositions    Return in about 6 months (around 2/16/2023) for Bp check. I have reviewed with the patient details of the assessment and plan and all questions were answered. Relevent patient education was performed. The most recent lab findings were reviewed with the patient. An After Visit Summary was printed and given to the patient.

## 2022-08-17 DIAGNOSIS — E66.01 CLASS 3 SEVERE OBESITY WITH SERIOUS COMORBIDITY AND BODY MASS INDEX (BMI) OF 45.0 TO 49.9 IN ADULT, UNSPECIFIED OBESITY TYPE (HCC): Primary | ICD-10-CM

## 2022-08-17 LAB
T4 FREE SERPL-MCNC: 2.17 NG/DL (ref 0.82–1.77)
TSH SERPL DL<=0.005 MIU/L-ACNC: 0.44 UIU/ML (ref 0.45–4.5)

## 2022-08-17 RX ORDER — DEXAMETHASONE 1 MG/1
TABLET ORAL
Qty: 1 TABLET | Refills: 0 | Status: SHIPPED | OUTPATIENT
Start: 2022-08-17

## 2022-09-27 ENCOUNTER — HOSPITAL ENCOUNTER (OUTPATIENT)
Dept: MAMMOGRAPHY | Age: 52
Discharge: HOME | End: 2022-09-27
Payer: MEDICAID

## 2022-09-27 DIAGNOSIS — Z12.31 BREAST CANCER SCREENING BY MAMMOGRAM: ICD-10-CM

## 2022-09-27 PROCEDURE — 77063 BREAST TOMOSYNTHESIS BI: CPT

## 2022-10-05 ENCOUNTER — OFFICE VISIT (OUTPATIENT)
Dept: INTERNAL MEDICINE CLINIC | Age: 52
End: 2022-10-05
Payer: MEDICAID

## 2022-10-05 ENCOUNTER — NURSE TRIAGE (OUTPATIENT)
Dept: OTHER | Facility: CLINIC | Age: 52
End: 2022-10-05

## 2022-10-05 VITALS
RESPIRATION RATE: 18 BRPM | HEART RATE: 87 BPM | TEMPERATURE: 97.7 F | HEIGHT: 64 IN | SYSTOLIC BLOOD PRESSURE: 131 MMHG | BODY MASS INDEX: 47.46 KG/M2 | WEIGHT: 278 LBS | OXYGEN SATURATION: 99 % | DIASTOLIC BLOOD PRESSURE: 79 MMHG

## 2022-10-05 DIAGNOSIS — I12.9 HYPERTENSION, RENAL DISEASE, STAGE 1-4 OR UNSPECIFIED CHRONIC KIDNEY DISEASE: ICD-10-CM

## 2022-10-05 DIAGNOSIS — R21 RASH OF NECK: Primary | ICD-10-CM

## 2022-10-05 PROCEDURE — 99214 OFFICE O/P EST MOD 30 MIN: CPT | Performed by: INTERNAL MEDICINE

## 2022-10-05 RX ORDER — TRIAMCINOLONE ACETONIDE 1 MG/G
OINTMENT TOPICAL 2 TIMES DAILY
Qty: 80 G | Refills: 1 | Status: SHIPPED | OUTPATIENT
Start: 2022-10-05

## 2022-10-05 NOTE — TELEPHONE ENCOUNTER
Received call from Han Rolon at [facility] with Red Flag Complaint. Subjective: Caller states \"Rash on neck\"     Current Symptoms: Rash on neck, itching and swelling and painful. Have had happened before where it was just a little small section, can't eat anything tomatoes based-Wednesday at mixed nuts-has hazelnut-doesn't usually eat hazelnut- seem to calm down now flaring up again. Started a small area on one side now raised areas of red almost all around neck with some swelling, itching, and burning pain. Onset: 6 days ago; gradual, worsening    Associated Symptoms: NA    Pain Severity: 7/10; burning; constant-wax and wane    Temperature: Denies    What has been tried: Hydrocortisone, cornstarch-not helping. LMP:  2005  Pregnant: No    Recommended disposition: See in Office Today    Care advice provided, patient verbalizes understanding; denies any other questions or concerns; instructed to call back for any new or worsening symptoms. Patient/Caller agrees with recommended disposition; writer provided warm transfer to [facility] for appointment scheduling    Attention Provider: Thank you for allowing me to participate in the care of your patient. The patient was connected to triage in response to information provided to the [facility]. Please do not respond through this encounter as the response is not directed to a shared pool.         Reason for Disposition   Localized rash is very painful (no fever)    Protocols used: Rash or Redness - Localized-ADULT-[place]

## 2022-10-05 NOTE — PROGRESS NOTES
Pt is here for   Chief Complaint   Patient presents with    Rash     On neck, states that it burns and itches      1. Have you been to the ER, urgent care clinic since your last visit? Hospitalized since your last visit? No    2. Have you seen or consulted any other health care providers outside of the [address] [place] [address] since your last visit? Include any pap smears or colon screening.  No

## 2022-10-05 NOTE — PROGRESS NOTES
Jesica Marshall is a 47 y.o. female and presents with Rash (On neck, states that it burns and itches )  . Subjective:    Pt w above complaints. Denies new jewelry/perfume/lotions. .No previous occurrence    PMH-HTN-on indapamide 5mg ONLY     BP Readings from Last 3 Encounters:   10/05/22 131/79   08/16/22 124/61   08/16/22 132/88        Hypothyroidism-pt is currently on 300mcg levothyroxine 6/7 days. -pt  is followed by endocrinology     Lab Results   Component Value Date/Time    TSH 0.437 (L) 08/16/2022 11:26 AM    T3 Uptake 34 11/06/2019 03:21 PM    T4, Free 2.17 (H) 08/16/2022 11:26 AM    T4, Total 17.1 (H) 11/06/2019 03:21 PM        LUCINA-pt uses CPAP     Vit d def-  Lab Results   Component Value Date/Time    VITAMIN D, 25-HYDROXY 21.5 (L) 04/19/2019 11:56 AM        H/o cigarette smoker- quit 2019      Anxiety-MUCH improved on buspar     Morbid obesity-  Wt Readings from Last 3 Encounters:   10/05/22 278 lb (126.1 kg)   08/16/22 281 lb 3.2 oz (127.6 kg)   08/16/22 280 lb 8 oz (127.2 kg)      Chronic lbp w radiculopathy-pt reports that gabapentin 300mg TID relieves her back pain, but not the neuropathy    -pt declines maxing dose of gabapentin due to SE     -pt declines pain management referral      CKD 3-  Lab Results   Component Value Date/Time    GFR est AA 43 (L) 07/23/2021 01:44 PM    GFR est non-AA 35 (L) 07/23/2021 01:44 PM    Creatinine 1.55 (H) 07/23/2021 01:44 PM    BUN 13 07/23/2021 01:44 PM    Sodium 140 07/23/2021 01:44 PM    Potassium 4.1 07/23/2021 01:44 PM    Chloride 104 07/23/2021 01:44 PM    CO2 32 07/23/2021 01:44 PM         Review of Systems  Constitutional: negative for fevers, chills, anorexia and weight loss  Eyes:   negative for visual disturbance and irritation  ENT:   negative for tinnitus,sore throat,nasal congestion,ear pains. hoarseness  Respiratory:  negative for cough, hemoptysis, dyspnea,wheezing  CV:   negative for chest pain, palpitations, lower extremity edema  GI: negative for nausea, vomiting, diarrhea, abdominal pain,melena  Neurological:  negative for headaches, dizziness, vertigo, memory problems and gait     Past Medical History:   Diagnosis Date    Chronic kidney disease     kidney failure-left    Depression     GERD (gastroesophageal reflux disease)     Grave's disease     Hypertension     Hypothyroidism following radioiodine therapy     2009    Liver disease     Morbid obesity ([address]     Nausea & vomiting     Obstructive sleep apnea (adult) (pediatric) 1/8/2013    Thyroid disease      Past Surgical History:   Procedure Laterality Date    COLONOSCOPY N/A 7/12/2019    COLONOSCOPY performed by Fani Hogan MD at [facility] ENDOSCOPY    HX CHOLECYSTECTOMY      HX GYN  2005    novasure    HX ORTHOPAEDIC      Left arm    HX UROLOGICAL      bladder sling     Social History     Socioeconomic History    Marital status: SINGLE   Tobacco Use    Smoking status: Light Smoker     Packs/day: 0.25     Types: Cigarettes    Smokeless tobacco: Never    Tobacco comments:     2 cigs once a month   Vaping Use    Vaping Use: Never used   Substance and Sexual Activity    Alcohol use: Not Currently     Comment: rare, 1 drink a month    Drug use: No    Sexual activity: Not Currently     Family History   Problem Relation Age of Onset    Hypertension Mother     Emphysema Mother     Hypertension Father     Other Father     Heart Attack Father     Stroke Father     Osteoporosis Father     Diabetes Sister     Hypertension Brother      Current Outpatient Medications   Medication Sig Dispense Refill    triamcinolone acetonide (KENALOG) 0.1 % ointment Apply  to affected area two (2) times a day. use thin layer 80 g 1    amLODIPine (NORVASC) 5 mg tablet Take 1 Tablet by mouth in the morning. 90 Tablet 1    busPIRone (BUSPAR) 7.5 mg tablet Take 1 Tablet by mouth three (3) times daily.  90 Tablet 5    cyclobenzaprine (FLEXERIL) 10 mg tablet Take 1 Tablet by mouth three (3) times daily as needed for Muscle Spasm(s). 30 Tablet 1    indapamide (LOZOL) 2.5 mg tablet TAKE 2 TABLETS BY MOUTH EVERY  Tablet 2    meclizine (ANTIVERT) 25 mg tablet TAKE 1 TABLET BY MOUTH THREE (3) TIMES DAILY AS NEEDED FOR DIZZINESS FOR UP TO 10 DAYS. omeprazole (PRILOSEC) 40 mg capsule Take 1 Capsule by mouth daily. 30 Capsule 2    levothyroxine (SYNTHROID) 300 mcg tablet Take 1 Tablet by mouth Daily (before breakfast). Call office to make a follow up appointment 30 Tablet 1     Allergies   Allergen Reactions    Ace Inhibitors Cough    Other Medication Unable to [employer] names, 1 for sinus,1 for bp and 1 for bladder pt states it is called \"sentura\"    Penicillins Rash    Sanctura [Trospium] Other (comments)    Jamia Rohit [Aclidinium Bromide] Swelling    Vesicare [Solifenacin] Rash and Other (comments)       Objective:  Visit Vitals  /79 (BP 1 Location: Left upper arm, BP Patient Position: Sitting, BP Cuff Size: Large adult)   Pulse 87   Temp 97.7 °F (36.5 °C) (Temporal)   Resp 18   Ht 5' 4\" (1.626 m)   Wt 278 lb (126.1 kg)   SpO2 99%   BMI 47.72 kg/m²     Physical Exam:   General appearance - alert, well appearing, and in no distress   Mental status - alert, oriented to person, place, and time  EYE-EOMI  Neck - supple, rash around base of neck appears c/w contact derm vs eczematoid  Chest - symmetric air entry    Abdomen - obese  Ext-no pedal edema, no clubbing or cyanosis  Skin-Warm and dry. no hyperpigmentation, vitiligo, or suspicious lesions  Neuro -alert, oriented, normal speech, no focal findings or movement disorder noted        Results for orders placed or performed in visit on 04/15/22   TSH 3RD GENERATION   Result Value Ref Range    TSH 0.437 (L) 0.450 - 4.500 uIU/mL   T4, FREE   Result Value Ref Range    T4, Free 2.17 (H) 0.82 - 1.77 ng/dL       Assessment/Plan:    ICD-10-CM ICD-9-CM    1. Rash of neck  R21 782.1 triamcinolone acetonide (KENALOG) 0.1 % ointment      2.  Hypertension, renal disease, stage 1-4 or unspecified chronic kidney disease  I12.9 403.90           Orders Placed This Encounter    triamcinolone acetonide (KENALOG) 0.1 % ointment     Sig: Apply  to affected area two (2) times a day. use thin layer     Dispense:  80 g     Refill:  1         1. Rash of neck    - triamcinolone acetonide (KENALOG) 0.1 % ointment; Apply  to affected area two (2) times a day. use thin layer  Dispense: 80 g; Refill: 1    2. Hypertension, renal disease, stage 1-4 or unspecified chronic kidney disease  Controlled on current regimen        There are no Patient Instructions on file for this visit. Follow-up and Dispositions    Return if symptoms worsen or fail to improve. I have reviewed with the patient details of the assessment and plan and all questions were answered. Relevent patient education was performed. The most recent lab findings were reviewed with the patient. An After Visit Summary was printed and given to the patient.

## 2022-12-23 ENCOUNTER — VIRTUAL VISIT (OUTPATIENT)
Dept: ENDOCRINOLOGY | Age: 52
End: 2022-12-23
Payer: MEDICAID

## 2022-12-23 DIAGNOSIS — E89.0 HYPOTHYROIDISM FOLLOWING RADIOIODINE THERAPY: ICD-10-CM

## 2022-12-23 PROCEDURE — 99214 OFFICE O/P EST MOD 30 MIN: CPT | Performed by: INTERNAL MEDICINE

## 2022-12-23 RX ORDER — LEVOTHYROXINE SODIUM 300 UG/1
300 TABLET ORAL
Qty: 30 TABLET | Refills: 3 | Status: SHIPPED | OUTPATIENT
Start: 2022-12-23

## 2022-12-23 NOTE — LETTER
12/23/2022 11:31 AM    Patient:  Reina Okeefe   YOB: 1970  Date of Visit: 12/23/2022      Dear Guillermo Cerda MD  [address] [place] [address] [place]: Thank you for referring Ms. Ronda Hansen to me for evaluation/treatment. Below are the relevant portions of my assessment and plan of care. If you have questions, please do not hesitate to call me. I look forward to following Ms. Celsa Le along with you.     No notes on file    Sincerely,      Thaddeus Alba MD

## 2022-12-23 NOTE — PROGRESS NOTES
Chief Complaint   Patient presents with    Thyroid Problem     Pcp and pharmacy verified    [facility]  [phone] (M)            **THIS IS A VIRTUAL VISIT VIA A VIDEO ENCOUNTER. PATIENT AGREED TO HAVE THEIR CARE DELIVERED OVER VIDEO IN PLACE OF THEIR REGULARLY SCHEDULED OFFICE VISIT**         Alix Cedillo, was evaluated through a synchronous (real-time) audio-video encounter. The patient (or guardian if applicable) is aware that this is a billable service, which includes applicable co-pays. This Virtual Visit was conducted with patient's (and/or legal guardian's) consent. The visit was conducted pursuant to the emergency declaration under the [facility], [facility] authority and the [employer] Act. Patient identification was verified, and a caregiver was present when appropriate. The patient was located in a state where the provider was licensed to provide care. History of Present Illness: Berenice Sandy is a 47 y.o. female here for follow up of hypothyroidism secondary to PALAFOX. Pt denies any recent illnesses, injuries or hospitalizations. Pt reports she is taking the LT4 300mcg, every morning, by itself, on an empty stomach \"sometimes I can not drink water in the morning so I have to drink it with tea. \"    She denies issues of CP, SOB, tremors, \"I will go back and forth between diarrhea and constipation and I feel hot frequently. \"  She denies issues of dysphagia or dysphonia. Pt continues to have panic attacks, \"I can get them as often as 2 times per day, but I can go for weeks with nothing. \"    Pt is post-menopausal since 2005. Pt reports that her eye doctor told her that he felt that she was having issues of pressure in her eye and he thought it could be thyroid related. She is followed by Dr. Tess Gusman. She last saw her eye doctor was \"over a year ago. \"    Current Outpatient Medications Medication Sig    levothyroxine (SYNTHROID) 300 mcg tablet Take 1 Tablet by mouth Daily (before breakfast). triamcinolone acetonide (KENALOG) 0.1 % ointment Apply  to affected area two (2) times a day. use thin layer    amLODIPine (NORVASC) 5 mg tablet Take 1 Tablet by mouth in the morning. busPIRone (BUSPAR) 7.5 mg tablet Take 1 Tablet by mouth three (3) times daily. cyclobenzaprine (FLEXERIL) 10 mg tablet Take 1 Tablet by mouth three (3) times daily as needed for Muscle Spasm(s). indapamide (LOZOL) 2.5 mg tablet TAKE 2 TABLETS BY MOUTH EVERY DAY    meclizine (ANTIVERT) 25 mg tablet TAKE 1 TABLET BY MOUTH THREE (3) TIMES DAILY AS NEEDED FOR DIZZINESS FOR UP TO 10 DAYS. omeprazole (PRILOSEC) 40 mg capsule Take 1 Capsule by mouth daily. No current facility-administered medications for this visit. Allergies   Allergen Reactions    Ace Inhibitors Cough    Other Medication Unable to [employer] names, 1 for sinus,1 for bp and 1 for bladder pt states it is called \"sentura\"    Penicillins Rash    Sanctura [Trospium] Other (comments)    Tita Langton [Aclidinium Bromide] Swelling    Vesicare [Solifenacin] Rash and Other (comments)     Review of Systems:  - Cardiovascular: no chest pain  - Neurological: no tremors  - Integumentary: skin is normal    Physical Examination:  There were no vitals taken for this visit.   - GENERAL: NCAT, Appears well nourished   - EYES: EOMI, non-icteric, no proptosis   - Ear/Nose/Throat: NCAT, no visible inflammation or masses   - CARDIOVASCULAR: no cyanosis, no visible JVD   - RESPIRATORY: respiratory effort normal without any distress or labored breathing   - MUSCULOSKELETAL: Normal ROM of neck and upper extremities observed   - SKIN: No rash on face   - NEUROLOGIC:  No facial asymmetry (Cranial nerve 7 motor function), No gaze palsy   - PSYCHIATRIC: Normal affect, Normal insight and judgement       Data Reviewed:   - none new for review    Assessment/Plan:   1) Hypothyroidism > Pt is clinically euthyroid on LT4 300mcg daily. Will order TFTs and adjust her doses as needed. She will need a 90 day refill once her TFTs are back. Pt voices understanding and agreement with the plan. Pain noted and pt was recommended to call her PCP for further evaluation and treatment, as needed    Pt would prefer a phone call to discuss the results.     RTC based on her above results    Copy sent to:  Dr. Di Davis

## 2023-01-28 ENCOUNTER — APPOINTMENT (OUTPATIENT)
Dept: CT IMAGING | Age: 53
End: 2023-01-28
Attending: EMERGENCY MEDICINE
Payer: MEDICAID

## 2023-01-28 ENCOUNTER — APPOINTMENT (OUTPATIENT)
Dept: GENERAL RADIOLOGY | Age: 53
End: 2023-01-28
Attending: EMERGENCY MEDICINE
Payer: MEDICAID

## 2023-01-28 ENCOUNTER — HOSPITAL ENCOUNTER (EMERGENCY)
Age: 53
Discharge: HOME | End: 2023-01-28
Attending: EMERGENCY MEDICINE
Payer: MEDICAID

## 2023-01-28 VITALS
RESPIRATION RATE: 24 BRPM | HEART RATE: 98 BPM | SYSTOLIC BLOOD PRESSURE: 144 MMHG | OXYGEN SATURATION: 99 % | TEMPERATURE: 102.6 F | WEIGHT: 275.57 LBS | HEIGHT: 64 IN | DIASTOLIC BLOOD PRESSURE: 101 MMHG | BODY MASS INDEX: 47.05 KG/M2

## 2023-01-28 DIAGNOSIS — J02.8 ACUTE PHARYNGITIS DUE TO OTHER SPECIFIED ORGANISMS: ICD-10-CM

## 2023-01-28 DIAGNOSIS — R05.1 ACUTE COUGH: ICD-10-CM

## 2023-01-28 DIAGNOSIS — U07.1 COVID-19: Primary | ICD-10-CM

## 2023-01-28 LAB
ALBUMIN SERPL-MCNC: 3.6 G/DL (ref 3.5–5)
ALBUMIN/GLOB SERPL: 0.8 (ref 1.1–2.2)
ALP SERPL-CCNC: 85 U/L (ref 45–117)
ALT SERPL-CCNC: 30 U/L (ref 12–78)
ANION GAP SERPL CALC-SCNC: 4 MMOL/L (ref 5–15)
AST SERPL-CCNC: 31 U/L (ref 15–37)
ATRIAL RATE: 89 BPM
BASOPHILS # BLD: 0 K/UL (ref 0–0.1)
BASOPHILS NFR BLD: 0 % (ref 0–1)
BILIRUB SERPL-MCNC: 0.4 MG/DL (ref 0.2–1)
BNP SERPL-MCNC: 94 PG/ML
BUN SERPL-MCNC: 10 MG/DL (ref 6–20)
BUN/CREAT SERPL: 7 (ref 12–20)
CALCIUM SERPL-MCNC: 9.4 MG/DL (ref 8.5–10.1)
CALCULATED P AXIS, ECG09: 49 DEGREES
CALCULATED R AXIS, ECG10: 11 DEGREES
CALCULATED T AXIS, ECG11: 35 DEGREES
CHLORIDE SERPL-SCNC: 98 MMOL/L (ref 97–108)
CO2 SERPL-SCNC: 32 MMOL/L (ref 21–32)
COVID-19 RAPID TEST, COVR: DETECTED
CREAT SERPL-MCNC: 1.34 MG/DL (ref 0.55–1.02)
DIAGNOSIS, 93000: NORMAL
DIFFERENTIAL METHOD BLD: ABNORMAL
EOSINOPHIL # BLD: 0 K/UL (ref 0–0.4)
EOSINOPHIL NFR BLD: 0 % (ref 0–7)
ERYTHROCYTE [DISTWIDTH] IN BLOOD BY AUTOMATED COUNT: 14.4 % (ref 11.5–14.5)
GLOBULIN SER CALC-MCNC: 4.5 G/DL (ref 2–4)
GLUCOSE SERPL-MCNC: 124 MG/DL (ref 65–100)
HCT VFR BLD AUTO: 42 % (ref 35–47)
HGB BLD-MCNC: 13.6 G/DL (ref 11.5–16)
IMM GRANULOCYTES # BLD AUTO: 0 K/UL (ref 0–0.04)
IMM GRANULOCYTES NFR BLD AUTO: 0 % (ref 0–0.5)
LYMPHOCYTES # BLD: 2.3 K/UL (ref 0.8–3.5)
LYMPHOCYTES NFR BLD: 19 % (ref 12–49)
MCH RBC QN AUTO: 28.2 PG (ref 26–34)
MCHC RBC AUTO-ENTMCNC: 32.4 G/DL (ref 30–36.5)
MCV RBC AUTO: 87.1 FL (ref 80–99)
MONOCYTES # BLD: 1.4 K/UL (ref 0–1)
MONOCYTES NFR BLD: 12 % (ref 5–13)
NEUTS SEG # BLD: 8.3 K/UL (ref 1.8–8)
NEUTS SEG NFR BLD: 69 % (ref 32–75)
NRBC # BLD: 0 K/UL (ref 0–0.01)
NRBC BLD-RTO: 0 PER 100 WBC
P-R INTERVAL, ECG05: 160 MS
PLATELET # BLD AUTO: 238 K/UL (ref 150–400)
PMV BLD AUTO: 10.3 FL (ref 8.9–12.9)
POTASSIUM SERPL-SCNC: 4.2 MMOL/L (ref 3.5–5.1)
PROT SERPL-MCNC: 8.1 G/DL (ref 6.4–8.2)
Q-T INTERVAL, ECG07: 342 MS
QRS DURATION, ECG06: 80 MS
QTC CALCULATION (BEZET), ECG08: 416 MS
RBC # BLD AUTO: 4.82 M/UL (ref 3.8–5.2)
SODIUM SERPL-SCNC: 134 MMOL/L (ref 136–145)
SOURCE, COVRS: ABNORMAL
TROPONIN-HIGH SENSITIVITY: 71 NG/L (ref 0–51)
TROPONIN-HIGH SENSITIVITY: 74 NG/L (ref 0–51)
VENTRICULAR RATE, ECG03: 89 BPM
WBC # BLD AUTO: 12.1 K/UL (ref 3.6–11)

## 2023-01-28 PROCEDURE — 93005 ELECTROCARDIOGRAM TRACING: CPT

## 2023-01-28 PROCEDURE — 74011250637 HC RX REV CODE- 250/637: Performed by: EMERGENCY MEDICINE

## 2023-01-28 PROCEDURE — 74011000636 HC RX REV CODE- 636: Performed by: EMERGENCY MEDICINE

## 2023-01-28 PROCEDURE — 84484 ASSAY OF TROPONIN QUANT: CPT

## 2023-01-28 PROCEDURE — 71275 CT ANGIOGRAPHY CHEST: CPT

## 2023-01-28 PROCEDURE — 74011250636 HC RX REV CODE- 250/636: Performed by: EMERGENCY MEDICINE

## 2023-01-28 PROCEDURE — 96375 TX/PRO/DX INJ NEW DRUG ADDON: CPT

## 2023-01-28 PROCEDURE — 96374 THER/PROPH/DIAG INJ IV PUSH: CPT

## 2023-01-28 PROCEDURE — 83880 ASSAY OF NATRIURETIC PEPTIDE: CPT

## 2023-01-28 PROCEDURE — 85025 COMPLETE CBC W/AUTO DIFF WBC: CPT

## 2023-01-28 PROCEDURE — 99285 EMERGENCY DEPT VISIT HI MDM: CPT

## 2023-01-28 PROCEDURE — 87635 SARS-COV-2 COVID-19 AMP PRB: CPT

## 2023-01-28 PROCEDURE — 80053 COMPREHEN METABOLIC PANEL: CPT

## 2023-01-28 PROCEDURE — 36415 COLL VENOUS BLD VENIPUNCTURE: CPT

## 2023-01-28 RX ORDER — BENZONATATE 100 MG/1
100 CAPSULE ORAL
Qty: 21 CAPSULE | Refills: 0 | Status: SHIPPED | OUTPATIENT
Start: 2023-01-28 | End: 2023-02-04

## 2023-01-28 RX ORDER — KETOROLAC TROMETHAMINE 30 MG/ML
15 INJECTION, SOLUTION INTRAMUSCULAR; INTRAVENOUS
Status: DISCONTINUED | OUTPATIENT
Start: 2023-01-28 | End: 2023-01-28

## 2023-01-28 RX ORDER — ACETAMINOPHEN 500 MG
1000 TABLET ORAL ONCE
Status: COMPLETED | OUTPATIENT
Start: 2023-01-28 | End: 2023-01-28

## 2023-01-28 RX ORDER — ONDANSETRON 2 MG/ML
4 INJECTION INTRAMUSCULAR; INTRAVENOUS ONCE
Status: COMPLETED | OUTPATIENT
Start: 2023-01-28 | End: 2023-01-28

## 2023-01-28 RX ORDER — METHYLPREDNISOLONE 4 MG/1
TABLET ORAL
Qty: 1 DOSE PACK | Refills: 0 | Status: SHIPPED | OUTPATIENT
Start: 2023-01-28

## 2023-01-28 RX ORDER — ONDANSETRON 4 MG/1
4 TABLET, ORALLY DISINTEGRATING ORAL
Qty: 12 TABLET | Refills: 0 | Status: SHIPPED | OUTPATIENT
Start: 2023-01-28

## 2023-01-28 RX ORDER — MORPHINE SULFATE 2 MG/ML
4 INJECTION, SOLUTION INTRAMUSCULAR; INTRAVENOUS
Status: COMPLETED | OUTPATIENT
Start: 2023-01-28 | End: 2023-01-28

## 2023-01-28 RX ADMIN — IOPAMIDOL 100 ML: 755 INJECTION, SOLUTION INTRAVENOUS at 13:56

## 2023-01-28 RX ADMIN — SODIUM CHLORIDE, POTASSIUM CHLORIDE, SODIUM LACTATE AND CALCIUM CHLORIDE 500 ML: 600; 310; 30; 20 INJECTION, SOLUTION INTRAVENOUS at 13:19

## 2023-01-28 RX ADMIN — ACETAMINOPHEN 1000 MG: 500 TABLET ORAL at 13:19

## 2023-01-28 RX ADMIN — ONDANSETRON 4 MG: 2 INJECTION INTRAMUSCULAR; INTRAVENOUS at 14:25

## 2023-01-28 RX ADMIN — MORPHINE SULFATE 4 MG: 2 INJECTION, SOLUTION INTRAMUSCULAR; INTRAVENOUS at 13:19

## 2023-01-28 NOTE — ED NOTES
Pt discharged by Naomi Morejon RN. Discharge instructions discussed and pt given opportunity to ask questions.  Pt ambulatory out of ED

## 2023-01-28 NOTE — DISCHARGE INSTRUCTIONS
You were seen in the ER for your symptoms. Please take the steroids as well as anti-inflammatory medicines to help with sore throat and cough. You can also use a cough suppressant. Your CAT scan did not show any pneumonia or blood clot but did show the blood vessel to your lungs was mildly dilated. Please return for new or worsened symptoms anytime.

## 2023-01-28 NOTE — ED PROVIDER NOTES
\[facility]\"" EMERGENCY DEPT  EMERGENCY DEPARTMENT ENCOUNTER       Pt Name: Carline Vaughan  MRN: [id number]  [place] 1970  Date of evaluation: 1/28/2023  Provider: Keyur Menard MD   PCP: Serge Martinez MD  Note Started: 1:14 PM 1/28/23     CHIEF COMPLAINT       Chief Complaint   Patient presents with    Cough    Positive For Covid-19     Pt tested positive for Covid on Wednesday, pt has chest and head and nasal congestion. Pt has sore throat and it is hard to swallow. Pt is wheezing    Fever        HISTORY OF PRESENT ILLNESS: 1 or more elements      History From: patient, History limited by: none     Carline Vaughan is a 47 y.o. female who presents to the ER with a chief complaint of cough. Patient reports symptoms began Tuesday 4 days ago, on Wednesday, patient did a home COVID test which was positive. Patient reports sore throat and cough. Reports developed hemoptysis 2 days ago. Reports severe sore throat feeling like swallowing is \"sandpaper. \"    Patient reports chest pain, cough, abdominal pain and vomiting. No diarrhea. Does report chills and fever. Nursing Notes were all reviewed and agreed with or any disagreements were addressed in the HPI. REVIEW OF SYSTEMS        Positives and Pertinent negatives as per HPI.     PAST HISTORY     Past Medical History:  Past Medical History:   Diagnosis Date    Chronic kidney disease     kidney failure-left    Depression     GERD (gastroesophageal reflux disease)     Grave's disease     Hypertension     Hypothyroidism following radioiodine therapy     2009    Liver disease     Morbid obesity ([address]     Nausea & vomiting     Obstructive sleep apnea (adult) (pediatric) 1/8/2013    Thyroid disease        Past Surgical History:  Past Surgical History:   Procedure Laterality Date    COLONOSCOPY N/A 7/12/2019    COLONOSCOPY performed by Nia Pillai MD at \[facility]\"" ENDOSCOPY    HX CHOLECYSTECTOMY      HX GYN  2005    novasure    HX ORTHOPAEDIC Left arm    HX UROLOGICAL      bladder sling       Family History:  Family History   Problem Relation Age of Onset    Hypertension Mother     Emphysema Mother     Hypertension Father     Other Father     Heart Attack Father     Stroke Father     Osteoporosis Father     Diabetes Sister     Hypertension Brother        Social History:  Social History     Tobacco Use    Smoking status: Light Smoker     Packs/day: 0.25     Types: Cigarettes    Smokeless tobacco: Never    Tobacco comments:     2 cigs once a month   Vaping Use    Vaping Use: Never used   Substance Use Topics    Alcohol use: Not Currently     Comment: rare, 1 drink a month    Drug use: No       Allergies: Allergies   Allergen Reactions    Ace Inhibitors Cough    Other Medication Unable to [employer] names, 1 for sinus,1 for bp and 1 for bladder pt states it is called \"sentura\"    Penicillins Rash    Sanctura [Trospium] Other (comments)    Charlanne Antoine [Aclidinium Bromide] Swelling    Vesicare [Solifenacin] Rash and Other (comments)       CURRENT MEDICATIONS      Discharge Medication List as of 1/28/2023  3:41 PM        CONTINUE these medications which have NOT CHANGED    Details   levothyroxine (SYNTHROID) 300 mcg tablet Take 1 Tablet by mouth Daily (before breakfast). , Normal, Disp-30 Tablet, R-3      triamcinolone acetonide (KENALOG) 0.1 % ointment Apply  to affected area two (2) times a day. use thin layer, Normal, Disp-80 g, R-1      amLODIPine (NORVASC) 5 mg tablet Take 1 Tablet by mouth in the morning., Normal, Disp-90 Tablet, R-1      busPIRone (BUSPAR) 7.5 mg tablet Take 1 Tablet by mouth three (3) times daily. , Normal, Disp-90 Tablet, R-5      cyclobenzaprine (FLEXERIL) 10 mg tablet Take 1 Tablet by mouth three (3) times daily as needed for Muscle Spasm(s). , Normal, Disp-30 Tablet, R-1      indapamide (LOZOL) 2.5 mg tablet TAKE 2 TABLETS BY MOUTH EVERY DAY, Normal, Disp-180 Tablet, R-2      meclizine (ANTIVERT) 25 mg tablet TAKE 1 TABLET BY MOUTH THREE (3) TIMES DAILY AS NEEDED FOR DIZZINESS FOR UP TO 10 DAYS., Historical Med      omeprazole (PRILOSEC) 40 mg capsule Take 1 Capsule by mouth daily. , Normal, Disp-30 Capsule, R-2             SCREENINGS               No data recorded         PHYSICAL EXAM      ED Triage Vitals [01/28/23 1158]   ED Encounter Vitals Group      BP (!) 144/101      Pulse (Heart Rate) 98      Resp Rate 24      Temp (!) 102.6 °F (39.2 °C)      Temp src       O2 Sat (%) 99 %      Weight 275 lb 9.2 oz      Height 5' 4\"        Physical Exam  Vitals and nursing note reviewed. Constitutional:       Comments: 58-year-old female, resting bed, appears uncomfortable   HENT:      Head: Normocephalic and atraumatic. Mouth/Throat:      Mouth: Mucous membranes are moist.      Pharynx: No oropharyngeal exudate or posterior oropharyngeal erythema. Cardiovascular:      Rate and Rhythm: Regular rhythm. Tachycardia present. Pulses: Normal pulses. Pulmonary:      Effort: Respiratory distress present. Breath sounds: Normal breath sounds. No wheezing or rhonchi. Abdominal:      General: Abdomen is flat. Tenderness: There is no abdominal tenderness. Musculoskeletal:      Cervical back: Normal range of motion. Right lower leg: No edema. Left lower leg: No edema. Skin:     General: Skin is warm. Neurological:      General: No focal deficit present. Mental Status: She is alert.    Psychiatric:         Mood and Affect: Mood normal.        DIAGNOSTIC RESULTS   LABS:     Recent Results (from the past 12 hour(s))   CBC WITH AUTOMATED DIFF    Collection Time: 01/28/23 12:09 PM   Result Value Ref Range    WBC 12.1 (H) 3.6 - 11.0 K/uL    RBC 4.82 3.80 - 5.20 M/uL    HGB 13.6 11.5 - 16.0 g/dL    HCT 42.0 35.0 - 47.0 %    MCV 87.1 80.0 - 99.0 FL    MCH 28.2 26.0 - 34.0 PG    MCHC 32.4 30.0 - 36.5 g/dL    RDW 14.4 11.5 - 14.5 %    PLATELET 889 035 - 909 K/uL    MPV 10.3 8.9 - 12.9 FL    NRBC 0.0 0  WBC    ABSOLUTE NRBC 0.00 0.00 - 0.01 K/uL    NEUTROPHILS 69 32 - 75 %    LYMPHOCYTES 19 12 - 49 %    MONOCYTES 12 5 - 13 %    EOSINOPHILS 0 0 - 7 %    BASOPHILS 0 0 - 1 %    IMMATURE GRANULOCYTES 0 0.0 - 0.5 %    ABS. NEUTROPHILS 8.3 (H) 1.8 - 8.0 K/UL    ABS. LYMPHOCYTES 2.3 0.8 - 3.5 K/UL    ABS. MONOCYTES 1.4 (H) 0.0 - 1.0 K/UL    ABS. EOSINOPHILS 0.0 0.0 - 0.4 K/UL    ABS. BASOPHILS 0.0 0.0 - 0.1 K/UL    ABS. IMM. GRANS. 0.0 0.00 - 0.04 K/UL    DF AUTOMATED     METABOLIC PANEL, COMPREHENSIVE    Collection Time: 01/28/23 12:09 PM   Result Value Ref Range    Sodium 134 (L) 136 - 145 mmol/L    Potassium 4.2 3.5 - 5.1 mmol/L    Chloride 98 97 - 108 mmol/L    CO2 32 21 - 32 mmol/L    Anion gap 4 (L) 5 - 15 mmol/L    Glucose 124 (H) 65 - 100 mg/dL    BUN 10 6 - 20 MG/DL    Creatinine 1.34 (H) 0.55 - 1.02 MG/DL    BUN/Creatinine ratio 7 (L) 12 - 20      eGFR 47 (L) >60 ml/min/1.73m2    Calcium 9.4 8.5 - 10.1 MG/DL    Bilirubin, total 0.4 0.2 - 1.0 MG/DL    ALT (SGPT) 30 12 - 78 U/L    AST (SGOT) 31 15 - 37 U/L    Alk.  phosphatase 85 45 - 117 U/L    Protein, total 8.1 6.4 - 8.2 g/dL    Albumin 3.6 3.5 - 5.0 g/dL    Globulin 4.5 (H) 2.0 - 4.0 g/dL    A-G Ratio 0.8 (L) 1.1 - 2.2     COVID-19 RAPID TEST    Collection Time: 01/28/23 12:09 PM   Result Value Ref Range    Specimen source Nasopharyngeal      COVID-19 rapid test Detected (AA) NOTD     TROPONIN-HIGH SENSITIVITY    Collection Time: 01/28/23 12:09 PM   Result Value Ref Range    Troponin-High Sensitivity 74 (H) 0 - 51 ng/L   NT-PRO BNP    Collection Time: 01/28/23 12:09 PM   Result Value Ref Range    NT pro-BNP 94 <125 PG/ML   EKG, 12 LEAD, INITIAL    Collection Time: 01/28/23  1:18 PM   Result Value Ref Range    Ventricular Rate 89 BPM    Atrial Rate 89 BPM    P-R Interval 160 ms    QRS Duration 80 ms    Q-T Interval 342 ms    QTC Calculation (Bezet) 416 ms    Calculated P Axis 49 degrees    Calculated R Axis 11 degrees    Calculated T Axis 35 degrees    Diagnosis Sinus rhythm with occasional premature ventricular complexes and fusion   complexes  When compared with ECG of 16-JAN-2014 19:57,  fusion complexes are now present  premature ventricular complexes are now present  T wave amplitude has decreased in Anterior leads  QT has shortened     TROPONIN-HIGH SENSITIVITY    Collection Time: 01/28/23  2:26 PM   Result Value Ref Range    Troponin-High Sensitivity 71 (H) 0 - 51 ng/L        EKG: If performed, independent interpretation documented below in the MDM section     RADIOLOGY:  Non-plain film images such as CT, Ultrasound and MRI are read by the radiologist. Plain radiographic images are visualized and preliminarily interpreted by the ED Provider with the findings documented in the MDM section. Interpretation per the Radiologist below, if available at the time of this note:     CTA CHEST W OR W WO CONT    Result Date: 1/28/2023  EXAM:  CTA CHEST W OR W WO CONT INDICATION: Hemoptysis, history of Covid, rule out pulmonary embolism COMPARISON: CTA chest January 15, 6562 TECHNIQUE: Helical thin section chest CT following uneventful intravenous administration of nonionic contrast 100 mL of isovue 370 according to departmental PE protocol. Coronal and sagittal reformats were performed. 3D post processing was performed. CT dose reduction was achieved through the use of a standardized protocol tailored for this examination and automatic exposure control for dose modulation. FINDINGS: This is a good quality study for the evaluation of pulmonary embolism to the first subsegmental arterial level. There is no pulmonary embolism to this level. Enlarged main pulmonary artery, 3.2 cm. THYROID: No nodule. MEDIASTINUM: No mass or lymphadenopathy. MYLA: No mass or lymphadenopathy. THORACIC AORTA: No aneurysm. HEART: Normal in size. ESOPHAGUS: No wall thickening or dilatation. TRACHEA/BRONCHI: Patent. PLEURA: No effusion or pneumothorax.  LUNGS: Scarring or subsegmental atelectasis in the left lung base. UPPER ABDOMEN: Partially imaged. No acute pathology. BONES: No aggressive bone lesion or fracture. 1. No pulmonary embolism, no acute airspace disease. 2. Borderline enlarged main pulmonary artery just above pulmonary arterial hypertension. PROCEDURES   Unless otherwise noted below, none  Procedures     CRITICAL CARE TIME   0    EMERGENCY DEPARTMENT COURSE and DIFFERENTIAL DIAGNOSIS/MDM   Vitals:    Vitals:    01/28/23 1158   BP: (!) 144/101   Pulse: 98   Resp: 24   Temp: (!) 102.6 °F (39.2 °C)   SpO2: 99%   Weight: 125 kg (275 lb 9.2 oz)   Height: 5' 4\" (1.626 m)        Patient was given the following medications:  Medications   acetaminophen (TYLENOL) tablet 1,000 mg (1,000 mg Oral Given 1/28/23 1319)   lactated ringers bolus infusion 500 mL (0 mL IntraVENous IV Completed 1/28/23 1558)   morphine injection 4 mg (4 mg IntraVENous Given 1/28/23 1319)   iopamidoL (ISOVUE-370) 370 mg iodine /mL (76 %) injection 100 mL (100 mL IntraVENous Given 1/28/23 1356)   ondansetron (ZOFRAN) injection 4 mg (4 mg IntraVENous Given 1/28/23 1425)       Medical Decision Making  47 YOF presents to the ED with a chief complaint of cough. Patient is COVID-positive. She is tachypneic here which may be in the setting of fever does report hemoptysis. Otherwise vitals appear well but patient peers uncomfortable. Does report a sore throat with decreased p.o. intake. I suspect patient's hemoptysis in the setting of increased pulmonary pressure in the setting of coughing, less likely PE. We will check a CTA to exclude PE given hemoptysis. This also evaluate lung parenchyma. Will check troponin and BNP. Will check EKG. Will medicate for pain. Reassess. Amount and/or Complexity of Data Reviewed  Labs: ordered. Radiology: ordered. Decision-making details documented in ED Course. ECG/medicine tests: ordered. Decision-making details documented in ED Course.     Risk  Prescription drug management. ED Course as of 01/28/23 2139   Sat Jan 28, 2023   1345 Preliminary EKG interpreted by me. Shows normal sinus rhythm with a HR of 89. No ST elevations or depressions concerning for ischemia. Normal intervals. [MB]   7429 CBC shows a mild leukocytosis. CMP with a creatinine of 1.3. Troponin is 74 will repeat likely elevated in setting of KRYSTINA. COVID is detected. CTA ordered. [MB]   4353 CTA reviewed without PE nor infiltrate. There is borderline pulmonary artery enlargement, likely incidental. [MB]   1447 Reassessed patient, improved tachypnea and cough. [MB]   1538 Repeat troponin stable. [MB]      ED Course User Index  [MB] Cricket Simon MD         FINAL IMPRESSION     1. COVID-19    2. Acute cough    3. Acute pharyngitis due to other specified organisms          DISPOSITION/PLAN   Alix Munson's  results have been reviewed with her. She has been counseled regarding her diagnosis, treatment, and plan. She verbally conveys understanding and agreement of the signs, symptoms, diagnosis, treatment and prognosis and additionally agrees to follow up as discussed. She also agrees with the care-plan and conveys that all of her questions have been answered. I have also provided discharge instructions for her that include: educational information regarding their diagnosis and treatment, and list of reasons why they would want to return to the ED prior to their follow-up appointment, should her condition change.      CLINICAL IMPRESSION    Discharged     PATIENT REFERRED TO:  Follow-up Information       Follow up With Specialties Details Why Contact Info    Rk Rose MD Internal Medicine Physician In 3 days  [address] [place] [address]  [phone]      [facility] EMERGENCY DEPT Emergency Medicine  If symptoms worsen [address]  [address] [facility]  [phone]              DISCHARGE MEDICATIONS:  Discharge Medication List as of 1/28/2023 3:41 PM        START taking these medications    Details   methylPREDNISolone (Medrol, Xu,) 4 mg tablet Use as directed, Normal, Disp-1 Dose Pack, R-0      benzonatate (TESSALON) 100 mg capsule Take 1 Capsule by mouth three (3) times daily as needed for Cough for up to 7 days. , Normal, Disp-21 Capsule, R-0      ondansetron (ZOFRAN ODT) 4 mg disintegrating tablet Take 1 Tablet by mouth every eight (8) hours as needed for Nausea or Vomiting., Normal, Disp-12 Tablet, R-0           CONTINUE these medications which have NOT CHANGED    Details   levothyroxine (SYNTHROID) 300 mcg tablet Take 1 Tablet by mouth Daily (before breakfast). , Normal, Disp-30 Tablet, R-3      triamcinolone acetonide (KENALOG) 0.1 % ointment Apply  to affected area two (2) times a day. use thin layer, Normal, Disp-80 g, R-1      amLODIPine (NORVASC) 5 mg tablet Take 1 Tablet by mouth in the morning., Normal, Disp-90 Tablet, R-1      busPIRone (BUSPAR) 7.5 mg tablet Take 1 Tablet by mouth three (3) times daily. , Normal, Disp-90 Tablet, R-5      cyclobenzaprine (FLEXERIL) 10 mg tablet Take 1 Tablet by mouth three (3) times daily as needed for Muscle Spasm(s). , Normal, Disp-30 Tablet, R-1      indapamide (LOZOL) 2.5 mg tablet TAKE 2 TABLETS BY MOUTH EVERY DAY, Normal, Disp-180 Tablet, R-2      meclizine (ANTIVERT) 25 mg tablet TAKE 1 TABLET BY MOUTH THREE (3) TIMES DAILY AS NEEDED FOR DIZZINESS FOR UP TO 10 DAYS., Historical Med      omeprazole (PRILOSEC) 40 mg capsule Take 1 Capsule by mouth daily. , Normal, Disp-30 Capsule, R-2               DISCONTINUED MEDICATIONS:  Discharge Medication List as of 1/28/2023  3:41 PM          I am the Primary Clinician of Record. Chung Almazan MD (electronically signed)    (Please note that parts of this dictation were completed with voice recognition software. Quite often unanticipated grammatical, syntax, homophones, and other interpretive errors are inadvertently transcribed by the computer software.  Please disregards these errors.  Please excuse any errors that have escaped final proofreading.)

## 2023-01-30 LAB
ATRIAL RATE: 89 BPM
CALCULATED P AXIS, ECG09: 49 DEGREES
CALCULATED R AXIS, ECG10: 11 DEGREES
CALCULATED T AXIS, ECG11: 35 DEGREES
DIAGNOSIS, 93000: NORMAL
P-R INTERVAL, ECG05: 160 MS
Q-T INTERVAL, ECG07: 342 MS
QRS DURATION, ECG06: 80 MS
QTC CALCULATION (BEZET), ECG08: 416 MS
VENTRICULAR RATE, ECG03: 89 BPM

## 2023-02-21 ENCOUNTER — OFFICE VISIT (OUTPATIENT)
Dept: INTERNAL MEDICINE CLINIC | Age: 53
End: 2023-02-21
Payer: MEDICAID

## 2023-02-21 VITALS
RESPIRATION RATE: 18 BRPM | WEIGHT: 274 LBS | SYSTOLIC BLOOD PRESSURE: 109 MMHG | TEMPERATURE: 98.2 F | HEART RATE: 81 BPM | OXYGEN SATURATION: 99 % | HEIGHT: 64 IN | BODY MASS INDEX: 46.78 KG/M2 | DIASTOLIC BLOOD PRESSURE: 67 MMHG

## 2023-02-21 DIAGNOSIS — G47.33 OSA (OBSTRUCTIVE SLEEP APNEA): ICD-10-CM

## 2023-02-21 DIAGNOSIS — F41.1 GENERALIZED ANXIETY DISORDER: ICD-10-CM

## 2023-02-21 DIAGNOSIS — E66.01 MORBID OBESITY (HCC): ICD-10-CM

## 2023-02-21 DIAGNOSIS — I12.9 HYPERTENSION, RENAL DISEASE, STAGE 1-4 OR UNSPECIFIED CHRONIC KIDNEY DISEASE: ICD-10-CM

## 2023-02-21 DIAGNOSIS — N18.30 STAGE 3 CHRONIC KIDNEY DISEASE, UNSPECIFIED WHETHER STAGE 3A OR 3B CKD (HCC): ICD-10-CM

## 2023-02-21 DIAGNOSIS — F32.9 MAJOR DEPRESSIVE DISORDER WITH CURRENT ACTIVE EPISODE, UNSPECIFIED DEPRESSION EPISODE SEVERITY, UNSPECIFIED WHETHER RECURRENT: Primary | ICD-10-CM

## 2023-02-21 PROCEDURE — 99215 OFFICE O/P EST HI 40 MIN: CPT | Performed by: INTERNAL MEDICINE

## 2023-02-21 RX ORDER — SERTRALINE HYDROCHLORIDE 100 MG/1
TABLET, FILM COATED ORAL
Qty: 34 TABLET | Refills: 0 | Status: SHIPPED | OUTPATIENT
Start: 2023-02-21 | End: 2023-03-30

## 2023-02-21 RX ORDER — AMLODIPINE BESYLATE 5 MG/1
5 TABLET ORAL DAILY
Qty: 90 TABLET | Refills: 3 | Status: SHIPPED | OUTPATIENT
Start: 2023-02-21

## 2023-02-21 NOTE — PROGRESS NOTES
Id  HIPAA verified by two patient identifiers. Health Maintenance Due   Topic    COVID-19 Vaccine (1)    DTaP/Tdap/Td series (1 - Tdap)    Shingles Vaccine (1 of 2)    Flu Vaccine (1)    A1C test (Diabetic or Prediabetic)      Chief Complaint   Patient presents with    Hypertension     Visit Vitals  /67   Pulse 81   Temp 98.2 °F (36.8 °C) (Temporal)   Resp 18   Ht 5' 4\" (1.626 m)   Wt 274 lb (124.3 kg)   SpO2 99%   BMI 47.03 kg/m²       Pain Scale: 6/10  Pain Location: Hip (right side from hip to calf)    1. Have you been to the ER, urgent care clinic since your last visit? Hospitalized since your last visit? No    2. Have you seen or consulted any other health care providers outside of the [address] [place] [address] since your last visit? Include any pap smears or colon screening.  No

## 2023-02-21 NOTE — PROGRESS NOTES
Lito Nichols is a 47 y.o. female and presents with Hypertension  . Subjective:    Pt as dx'ed w covid ~3 weeks ago. Since her dx, she has been experiencing decreased appetite and worsening sxs of depression. She denies SI. Pt declines referral to psychiatrist or therapist, but is willing to try something for her sxs. PMH-HTN-on indapamide 5mg ONLY     BP Readings from Last 3 Encounters:   02/21/23 109/67   01/28/23 (!) 144/101   10/05/22 131/79      Prediabetes  Lab Results   Component Value Date/Time    Hemoglobin A1c 5.7 (H) 02/15/2022 11:10 AM        Hypothyroidism-pt is currently on 300mcg levothyroxine 6/7 days.      -pt  is followed by endocrinology     Lab Results   Component Value Date/Time    TSH 0.437 (L) 08/16/2022 11:26 AM    T3 Uptake 34 11/06/2019 03:21 PM    T4, Free 2.17 (H) 08/16/2022 11:26 AM    T4, Total 17.1 (H) 11/06/2019 03:21 PM        LUCINA-pt uses CPAP     Vit d def-  Lab Results   Component Value Date/Time    VITAMIN D, 25-HYDROXY 21.5 (L) 04/19/2019 11:56 AM        H/o cigarette smoker- quit 2019      Anxiety-MUCH improved on buspar     Morbid obesity-  Wt Readings from Last 3 Encounters:   02/21/23 274 lb (124.3 kg)   01/28/23 275 lb 9.2 oz (125 kg)   10/05/22 278 lb (126.1 kg)      Chronic lbp w radiculopathy-pt reports that gabapentin 300mg TID relieves her back pain, but not the neuropathy    -pt declines maxing dose of gabapentin due to SE     -pt declines pain management referral      CKD 3-  Lab Results   Component Value Date/Time    GFR est AA 43 (L) 07/23/2021 01:44 PM    GFR est non-AA 35 (L) 07/23/2021 01:44 PM    Creatinine 1.34 (H) 01/28/2023 12:09 PM    BUN 10 01/28/2023 12:09 PM    Sodium 134 (L) 01/28/2023 12:09 PM    Potassium 4.2 01/28/2023 12:09 PM    Chloride 98 01/28/2023 12:09 PM    CO2 32 01/28/2023 12:09 PM         Review of Systems  Constitutional: negative for fevers, chills, anorexia and weight loss  Eyes:   negative for visual disturbance and irritation  ENT:   negative for tinnitus,sore throat,nasal congestion,ear pains. hoarseness  Respiratory:  negative for cough, hemoptysis, dyspnea,wheezing  CV:   negative for chest pain, palpitations, lower extremity edema  GI:   negative for nausea, vomiting, diarrhea, abdominal pain,melena  Neurological:  negative for headaches, dizziness, vertigo, memory problems and gait     Past Medical History:   Diagnosis Date    Chronic kidney disease     kidney failure-left    Depression     GERD (gastroesophageal reflux disease)     Grave's disease     Hypertension     Hypothyroidism following radioiodine therapy     2009    Liver disease     Morbid obesity ([address]     Nausea & vomiting     Obstructive sleep apnea (adult) (pediatric) 1/8/2013    Thyroid disease      Past Surgical History:   Procedure Laterality Date    COLONOSCOPY N/A 7/12/2019    COLONOSCOPY performed by Johnathan Ochoa MD at [facility] ENDOSCOPY    HX CHOLECYSTECTOMY      HX GYN  2005    novasure    HX ORTHOPAEDIC      Left arm    HX UROLOGICAL      bladder sling     Social History     Socioeconomic History    Marital status: SINGLE   Tobacco Use    Smoking status: Light Smoker     Packs/day: 0.25     Types: Cigarettes    Smokeless tobacco: Never    Tobacco comments:     2 cigs once a month   Vaping Use    Vaping Use: Never used   Substance and Sexual Activity    Alcohol use: Not Currently     Comment: rare, 1 drink a month    Drug use: No    Sexual activity: Not Currently     Social Determinants of Health     Financial Resource Strain: Low Risk     Difficulty of Paying Living Expenses: Not hard at all   Food Insecurity: No Food Insecurity    Worried About Running Out of Food in the Last Year: Never true    Ran Out of Food in the Last Year: Never true     Family History   Problem Relation Age of Onset    Hypertension Mother     Emphysema Mother     Hypertension Father     Other Father     Heart Attack Father     Stroke Father     Osteoporosis Father Diabetes Sister     Hypertension Brother      Current Outpatient Medications   Medication Sig Dispense Refill    sertraline (ZOLOFT) 100 mg tablet Take 0.5 Tablets by mouth daily for 7 days, THEN 1 Tablet daily for 30 days. 34 Tablet 0    amLODIPine (NORVASC) 5 mg tablet Take 1 Tablet by mouth daily. 90 Tablet 3    ondansetron (ZOFRAN ODT) 4 mg disintegrating tablet Take 1 Tablet by mouth every eight (8) hours as needed for Nausea or Vomiting. 12 Tablet 0    levothyroxine (SYNTHROID) 300 mcg tablet Take 1 Tablet by mouth Daily (before breakfast). 30 Tablet 3    triamcinolone acetonide (KENALOG) 0.1 % ointment Apply  to affected area two (2) times a day. use thin layer 80 g 1    busPIRone (BUSPAR) 7.5 mg tablet Take 1 Tablet by mouth three (3) times daily. 90 Tablet 5    cyclobenzaprine (FLEXERIL) 10 mg tablet Take 1 Tablet by mouth three (3) times daily as needed for Muscle Spasm(s). 30 Tablet 1    indapamide (LOZOL) 2.5 mg tablet TAKE 2 TABLETS BY MOUTH EVERY  Tablet 2    meclizine (ANTIVERT) 25 mg tablet TAKE 1 TABLET BY MOUTH THREE (3) TIMES DAILY AS NEEDED FOR DIZZINESS FOR UP TO 10 DAYS. omeprazole (PRILOSEC) 40 mg capsule Take 1 Capsule by mouth daily.  30 Capsule 2     Allergies   Allergen Reactions    Ace Inhibitors Cough    Other Medication Unable to [employer] names, 1 for sinus,1 for bp and 1 for bladder pt states it is called \"sentura\"    Penicillins Rash    Sanctura [Trospium] Other (comments)    Leatha Plata [Aclidinium Bromide] Swelling    Vesicare [Solifenacin] Rash and Other (comments)       Objective:  Visit Vitals  /67   Pulse 81   Temp 98.2 °F (36.8 °C) (Temporal)   Resp 18   Ht 5' 4\" (1.626 m)   Wt 274 lb (124.3 kg)   SpO2 99%   BMI 47.03 kg/m²       Physical Exam:   General appearance - alert, well appearing, sad and tearful  Mental status - alert, oriented to person, place, and time  EYE-EOMI  Neck - supple  Chest - symmetric air entry    Abdomen - obese  Ext-no pedal edema, no clubbing or cyanosis  Skin-Warm and dry. no hyperpigmentation, vitiligo, or suspicious lesions  Neuro -alert, oriented, normal speech, no focal findings or movement disorder noted        Results for orders placed or performed during the hospital encounter of 01/28/23   COVID-19 RAPID TEST   Result Value Ref Range    Specimen source Nasopharyngeal      COVID-19 rapid test Detected (AA) NOTD     CBC WITH AUTOMATED DIFF   Result Value Ref Range    WBC 12.1 (H) 3.6 - 11.0 K/uL    RBC 4.82 3.80 - 5.20 M/uL    HGB 13.6 11.5 - 16.0 g/dL    HCT 42.0 35.0 - 47.0 %    MCV 87.1 80.0 - 99.0 FL    MCH 28.2 26.0 - 34.0 PG    MCHC 32.4 30.0 - 36.5 g/dL    RDW 14.4 11.5 - 14.5 %    PLATELET 930 395 - 799 K/uL    MPV 10.3 8.9 - 12.9 FL    NRBC 0.0 0  WBC    ABSOLUTE NRBC 0.00 0.00 - 0.01 K/uL    NEUTROPHILS 69 32 - 75 %    LYMPHOCYTES 19 12 - 49 %    MONOCYTES 12 5 - 13 %    EOSINOPHILS 0 0 - 7 %    BASOPHILS 0 0 - 1 %    IMMATURE GRANULOCYTES 0 0.0 - 0.5 %    ABS. NEUTROPHILS 8.3 (H) 1.8 - 8.0 K/UL    ABS. LYMPHOCYTES 2.3 0.8 - 3.5 K/UL    ABS. MONOCYTES 1.4 (H) 0.0 - 1.0 K/UL    ABS. EOSINOPHILS 0.0 0.0 - 0.4 K/UL    ABS. BASOPHILS 0.0 0.0 - 0.1 K/UL    ABS. IMM. GRANS. 0.0 0.00 - 0.04 K/UL    DF AUTOMATED     METABOLIC PANEL, COMPREHENSIVE   Result Value Ref Range    Sodium 134 (L) 136 - 145 mmol/L    Potassium 4.2 3.5 - 5.1 mmol/L    Chloride 98 97 - 108 mmol/L    CO2 32 21 - 32 mmol/L    Anion gap 4 (L) 5 - 15 mmol/L    Glucose 124 (H) 65 - 100 mg/dL    BUN 10 6 - 20 MG/DL    Creatinine 1.34 (H) 0.55 - 1.02 MG/DL    BUN/Creatinine ratio 7 (L) 12 - 20      eGFR 47 (L) >60 ml/min/1.73m2    Calcium 9.4 8.5 - 10.1 MG/DL    Bilirubin, total 0.4 0.2 - 1.0 MG/DL    ALT (SGPT) 30 12 - 78 U/L    AST (SGOT) 31 15 - 37 U/L    Alk.  phosphatase 85 45 - 117 U/L    Protein, total 8.1 6.4 - 8.2 g/dL    Albumin 3.6 3.5 - 5.0 g/dL    Globulin 4.5 (H) 2.0 - 4.0 g/dL    A-G Ratio 0.8 (L) 1.1 - 2.2 TROPONIN-HIGH SENSITIVITY   Result Value Ref Range    Troponin-High Sensitivity 74 (H) 0 - 51 ng/L   NT-PRO BNP   Result Value Ref Range    NT pro-BNP 94 <125 PG/ML   TROPONIN-HIGH SENSITIVITY   Result Value Ref Range    Troponin-High Sensitivity 71 (H) 0 - 51 ng/L   EKG, 12 LEAD, INITIAL   Result Value Ref Range    Ventricular Rate 89 BPM    Atrial Rate 89 BPM    P-R Interval 160 ms    QRS Duration 80 ms    Q-T Interval 342 ms    QTC Calculation (Bezet) 416 ms    Calculated P Axis 49 degrees    Calculated R Axis 11 degrees    Calculated T Axis 35 degrees    Diagnosis       Sinus rhythm with occasional premature ventricular complexes and fusion   complexes  When compared with ECG of 16-JAN-2014 19:57,  fusion complexes are now present  premature ventricular complexes are now present  T wave amplitude has decreased in Anterior leads  QT has shortened  Confirmed by Darline Coreas ([id number]) on 1/30/2023 10:20:16 AM         Assessment/Plan:    ICD-10-CM ICD-9-CM    1. Major depressive disorder with current active episode, unspecified depression episode severity, unspecified whether recurrent  F32.9 296.30 sertraline (ZOLOFT) 100 mg tablet      2. Hypertension, renal disease, stage 1-4 or unspecified chronic kidney disease  I12.9 403.90 amLODIPine (NORVASC) 5 mg tablet      3. Stage 3 chronic kidney disease, unspecified whether stage 3a or 3b CKD (HCC)  N18.30 585.3       4. Morbid obesity ([facility] [address]  E66.01 278.01       5. LUCINA (obstructive sleep apnea)  G47.33 327.23       6. Generalized anxiety disorder  F41.1 300.02             Orders Placed This Encounter    sertraline (ZOLOFT) 100 mg tablet     Sig: Take 0.5 Tablets by mouth daily for 7 days, THEN 1 Tablet daily for 30 days. Dispense:  34 Tablet     Refill:  0    amLODIPine (NORVASC) 5 mg tablet     Sig: Take 1 Tablet by mouth daily. Dispense:  90 Tablet     Refill:  3       1.  Major depressive disorder with current active episode, unspecified depression episode severity, unspecified whether recurrent  Initiate med and short term f/up  - sertraline (ZOLOFT) 100 mg tablet; Take 0.5 Tablets by mouth daily for 7 days, THEN 1 Tablet daily for 30 days. Dispense: 34 Tablet; Refill: 0    2. Hypertension, renal disease, stage 1-4 or unspecified chronic kidney disease  Controlled  - amLODIPine (NORVASC) 5 mg tablet; Take 1 Tablet by mouth daily. Dispense: 90 Tablet; Refill: 3    3. Stage 3 chronic kidney disease, unspecified whether stage 3a or 3b CKD ([facility] [address]  Noted    4. Morbid obesity (HCC)  Unchanged    5. LUCINA (obstructive sleep apnea)  Cont CPAP    6. Generalized anxiety disorder  On buspar        There are no Patient Instructions on file for this visit. Follow-up and Dispositions    Return in about 4 weeks (around 3/21/2023) for vv. I have reviewed with the patient details of the assessment and plan and all questions were answered. Relevent patient education was performed. The most recent lab findings were reviewed with the patient. An After Visit Summary was printed and given to the patient.

## 2023-03-06 DIAGNOSIS — F41.9 ANXIETY: ICD-10-CM

## 2023-03-06 NOTE — TELEPHONE ENCOUNTER
[facility] is requesting a 90 day supply of the Bupsar 7.5 mg on behalf of the pt via fax. Last visit 02/21/2023 MD Horacio Mckeon   Next appointment 03/23/2023 MD Horacio Mckeon   Previous refill encounter(s)   08/16/2022 Buspar #90 with 5 refills (1tid)     For Pharmacy Admin Tracking Only    Program: Medication Refill  Intervention Detail: New Rx: 1, reason: Patient Preference  Time Spent (min): 5      Requested Prescriptions     Pending Prescriptions Disp Refills    busPIRone (BUSPAR) 7.5 mg tablet 270 Tablet 0     Sig: Take 1 Tablet by mouth three (3) times daily.

## 2023-03-07 RX ORDER — BUSPIRONE HYDROCHLORIDE 7.5 MG/1
7.5 TABLET ORAL 3 TIMES DAILY
Qty: 270 TABLET | Refills: 2 | Status: SHIPPED | OUTPATIENT
Start: 2023-03-07

## 2023-03-23 ENCOUNTER — VIRTUAL VISIT (OUTPATIENT)
Dept: INTERNAL MEDICINE CLINIC | Age: 53
End: 2023-03-23
Payer: MEDICAID

## 2023-03-23 DIAGNOSIS — F32.9 MAJOR DEPRESSIVE DISORDER WITH CURRENT ACTIVE EPISODE, UNSPECIFIED DEPRESSION EPISODE SEVERITY, UNSPECIFIED WHETHER RECURRENT: ICD-10-CM

## 2023-03-23 DIAGNOSIS — F41.9 ANXIETY: ICD-10-CM

## 2023-03-23 PROCEDURE — 99213 OFFICE O/P EST LOW 20 MIN: CPT | Performed by: INTERNAL MEDICINE

## 2023-03-23 RX ORDER — SERTRALINE HYDROCHLORIDE 100 MG/1
150 TABLET, FILM COATED ORAL DAILY
Qty: 135 TABLET | Refills: 1 | Status: SHIPPED | OUTPATIENT
Start: 2023-03-23

## 2023-03-23 RX ORDER — BUSPIRONE HYDROCHLORIDE 10 MG/1
10 TABLET ORAL 3 TIMES DAILY
Qty: 90 TABLET | Refills: 1 | Status: SHIPPED | OUTPATIENT
Start: 2023-03-23

## 2023-03-23 NOTE — PROGRESS NOTES
Alyson Lanza is a 47 y.o. female  HIPAA verified by two patient identifiers. Health Maintenance Due   Topic    A1C test (Diabetic or Prediabetic)      Chief Complaint   Patient presents with    Follow-up     Patient-Reported Vitals 8/5/2020   Patient-Reported Weight 266lb   Patient-Reported Height 5ft 4in   Patient-Reported Systolic  178   Patient-Reported Diastolic 83       Pain Scale: 0/10  Pain Location:             1. Have you been to the ER, urgent care clinic since your last visit? Hospitalized since your last visit? No    2. Have you seen or consulted any other health care providers outside of the [address] [place] [address] since your last visit? Include any pap smears or colon screening.  No

## 2023-03-23 NOTE — PROGRESS NOTES
Darlin Millard is a 47 y.o. female who was seen by synchronous (real-time) audio-video technology on 3/23/2023 for Follow-up and Medication Evaluation (/)        Assessment & Plan:   1. Anxiety  Adjust med  D/w pt the role that therapy plays in mental health journey  - busPIRone (BUSPAR) 10 mg tablet; Take 1 Tablet by mouth three (3) times daily. Dispense: 90 Tablet; Refill: 1    2. Major depressive disorder with current active episode, unspecified depression episode severity, unspecified whether recurrent  As above  - sertraline (ZOLOFT) 100 mg tablet; Take 1.5 Tablets by mouth daily. Dispense: 135 Tablet; Refill: 1    Subjective:       Pt report increased anxiety and less depression on current regimen    PMH-HTN-on indapamide 5mg ONLY     BP Readings from Last 3 Encounters:   02/21/23 109/67   01/28/23 (!) 144/101   10/05/22 131/79      Prediabetes  Lab Results   Component Value Date/Time    Hemoglobin A1c 5.7 (H) 02/15/2022 11:10 AM        Hypothyroidism-pt is currently on 300mcg levothyroxine 6/7 days.      -pt  is followed by endocrinology     Lab Results   Component Value Date/Time    TSH 0.437 (L) 08/16/2022 11:26 AM    T3 Uptake 34 11/06/2019 03:21 PM    T4, Free 2.17 (H) 08/16/2022 11:26 AM    T4, Total 17.1 (H) 11/06/2019 03:21 PM        LUCINA-pt uses CPAP     Vit d def-  Lab Results   Component Value Date/Time    VITAMIN D, 25-HYDROXY 21.5 (L) 04/19/2019 11:56 AM        H/o cigarette smoker- quit 2019      Anxiety-MUCH improved on buspar     Morbid obesity-  Wt Readings from Last 3 Encounters:   02/21/23 274 lb (124.3 kg)   01/28/23 275 lb 9.2 oz (125 kg)   10/05/22 278 lb (126.1 kg)      Chronic lbp w radiculopathy-pt reports that gabapentin 300mg TID relieves her back pain, but not the neuropathy    -pt declines maxing dose of gabapentin due to SE     -pt declines pain management referral      CKD 3-  Lab Results   Component Value Date/Time    GFR est AA 43 (L) 07/23/2021 01:44 PM    GFR est non-AA 35 (L) 07/23/2021 01:44 PM    Creatinine 1.34 (H) 01/28/2023 12:09 PM    BUN 10 01/28/2023 12:09 PM    Sodium 134 (L) 01/28/2023 12:09 PM    Potassium 4.2 01/28/2023 12:09 PM    Chloride 98 01/28/2023 12:09 PM    CO2 32 01/28/2023 12:09 PM     Prior to Admission medications    Medication Sig Start Date End Date Taking? Authorizing Provider   busPIRone (BUSPAR) 7.5 mg tablet Take 1 Tablet by mouth three (3) times daily. 3/7/23  Yes Aracelis Oliva MD   levothyroxine (SYNTHROID) 300 mcg tablet Take 1 Tablet by mouth Daily (before breakfast). Go have your thyroid labs drawn as requested. 3/1/23  Yes Salma Rosa MD   sertraline (ZOLOFT) 100 mg tablet Take 0.5 Tablets by mouth daily for 7 days, THEN 1 Tablet daily for 30 days. 2/21/23 3/30/23 Yes Aracelis Oliva MD   amLODIPine (NORVASC) 5 mg tablet Take 1 Tablet by mouth daily. 2/21/23  Yes Aracelis Oliva MD   ondansetron (ZOFRAN ODT) 4 mg disintegrating tablet Take 1 Tablet by mouth every eight (8) hours as needed for Nausea or Vomiting. 1/28/23  Yes Gage Blunt MD   triamcinolone acetonide (KENALOG) 0.1 % ointment Apply  to affected area two (2) times a day. use thin layer 10/5/22  Yes Aracelis Oliva MD   cyclobenzaprine (FLEXERIL) 10 mg tablet Take 1 Tablet by mouth three (3) times daily as needed for Muscle Spasm(s). 8/16/22  Yes Aracelis Oliva MD   indapamide (LOZOL) 2.5 mg tablet TAKE 2 TABLETS BY MOUTH EVERY DAY 8/16/22  Yes Aracelis Oliva MD   meclizine (ANTIVERT) 25 mg tablet TAKE 1 TABLET BY MOUTH THREE (3) TIMES DAILY AS NEEDED FOR DIZZINESS FOR UP TO 10 DAYS. 7/4/22  Yes Provider, Historical   omeprazole (PRILOSEC) 40 mg capsule Take 1 Capsule by mouth daily. 2/15/22  Yes Aracelis Oliva MD     Review of systems (12) negative, except noted above.         Objective:     Patient-Reported Vitals 3/23/2023   Patient-Reported Weight 272lb   Patient-Reported Height -   Patient-Reported Systolic  - Patient-Reported Diastolic -      General: alert, cooperative, no distress   Mental  status: normal mood, behavior, speech, dress, motor activity, and thought processes, able to follow commands   HENT: NCAT   Neck: no visualized mass   Resp: no respiratory distress   Neuro: no gross deficits   Skin: no discoloration or lesions of concern on visible areas   Psychiatric: normal affect, consistent with stated mood, no evidence of hallucinations     Additional exam findings: We discussed the expected course, resolution and complications of the diagnosis(es) in detail. Medication risks, benefits, costs, interactions, and alternatives were discussed as indicated. I advised her to contact the office if her condition worsens, changes or fails to improve as anticipated. She expressed understanding with the diagnosis(es) and plan. Joycelyn Coleman, was evaluated through a synchronous (real-time) audio-video encounter. The patient (or guardian if applicable) is aware that this is a billable service, which includes applicable co-pays. This Virtual Visit was conducted with patient's (and/or legal guardian's) consent. The visit was conducted pursuant to the emergency declaration under the [address] [place] [address], [address] [place] [address] authority and the "[employer] and [employer] General Act. Patient identification was verified, and a caregiver was present when appropriate.   The patient was located at: Home: [place] [id number]  The provider was located at: Home: Valentina Renner MD

## 2023-05-25 DIAGNOSIS — F41.9 ANXIETY DISORDER, UNSPECIFIED: ICD-10-CM

## 2023-06-21 DIAGNOSIS — F32.9 MAJOR DEPRESSIVE DISORDER, SINGLE EPISODE, UNSPECIFIED: ICD-10-CM

## 2023-06-21 DIAGNOSIS — R21 RASH AND OTHER NONSPECIFIC SKIN ERUPTION: ICD-10-CM

## 2023-06-23 DIAGNOSIS — F41.9 ANXIETY DISORDER, UNSPECIFIED: ICD-10-CM

## 2023-06-23 RX ORDER — SERTRALINE HYDROCHLORIDE 100 MG/1
TABLET, FILM COATED ORAL
Qty: 135 TABLET | Refills: 1 | Status: SHIPPED | OUTPATIENT
Start: 2023-06-23

## 2023-06-23 RX ORDER — BUSPIRONE HYDROCHLORIDE 10 MG/1
TABLET ORAL
Qty: 90 TABLET | Refills: 1 | Status: SHIPPED | OUTPATIENT
Start: 2023-06-23

## 2023-06-28 RX ORDER — BUSPIRONE HYDROCHLORIDE 10 MG/1
TABLET ORAL
Qty: 90 TABLET | Refills: 1 | OUTPATIENT
Start: 2023-06-28

## 2023-07-11 RX ORDER — LEVOTHYROXINE SODIUM 300 UG/1
300 TABLET ORAL
Qty: 90 TABLET | Refills: 1 | Status: SHIPPED | OUTPATIENT
Start: 2023-07-11

## 2023-08-09 ENCOUNTER — OFFICE VISIT (OUTPATIENT)
Facility: CLINIC | Age: 53
End: 2023-08-09
Payer: MEDICAID

## 2023-08-09 VITALS
RESPIRATION RATE: 18 BRPM | HEIGHT: 64 IN | WEIGHT: 258 LBS | DIASTOLIC BLOOD PRESSURE: 80 MMHG | SYSTOLIC BLOOD PRESSURE: 138 MMHG | BODY MASS INDEX: 44.05 KG/M2 | HEART RATE: 74 BPM | OXYGEN SATURATION: 97 % | TEMPERATURE: 98 F

## 2023-08-09 DIAGNOSIS — G47.33 OSA (OBSTRUCTIVE SLEEP APNEA): ICD-10-CM

## 2023-08-09 DIAGNOSIS — E55.9 VITAMIN D DEFICIENCY: ICD-10-CM

## 2023-08-09 DIAGNOSIS — N18.30 STAGE 3 CHRONIC KIDNEY DISEASE, UNSPECIFIED WHETHER STAGE 3A OR 3B CKD (HCC): ICD-10-CM

## 2023-08-09 DIAGNOSIS — F32.9 MAJOR DEPRESSIVE DISORDER WITH CURRENT ACTIVE EPISODE, UNSPECIFIED DEPRESSION EPISODE SEVERITY, UNSPECIFIED WHETHER RECURRENT: ICD-10-CM

## 2023-08-09 DIAGNOSIS — E66.01 MORBID OBESITY (HCC): ICD-10-CM

## 2023-08-09 DIAGNOSIS — R73.03 PREDIABETES: ICD-10-CM

## 2023-08-09 DIAGNOSIS — E89.0 HYPOTHYROIDISM FOLLOWING RADIOIODINE THERAPY: ICD-10-CM

## 2023-08-09 DIAGNOSIS — I12.9 HYPERTENSION, RENAL DISEASE, STAGE 1-4 OR UNSPECIFIED CHRONIC KIDNEY DISEASE: Primary | ICD-10-CM

## 2023-08-09 DIAGNOSIS — I12.9 HYPERTENSION, RENAL DISEASE, STAGE 1-4 OR UNSPECIFIED CHRONIC KIDNEY DISEASE: ICD-10-CM

## 2023-08-09 DIAGNOSIS — M25.561 ACUTE PAIN OF RIGHT KNEE: ICD-10-CM

## 2023-08-09 PROCEDURE — 99214 OFFICE O/P EST MOD 30 MIN: CPT | Performed by: INTERNAL MEDICINE

## 2023-08-09 SDOH — ECONOMIC STABILITY: FOOD INSECURITY: WITHIN THE PAST 12 MONTHS, YOU WORRIED THAT YOUR FOOD WOULD RUN OUT BEFORE YOU GOT MONEY TO BUY MORE.: NEVER TRUE

## 2023-08-09 SDOH — ECONOMIC STABILITY: FOOD INSECURITY: WITHIN THE PAST 12 MONTHS, YOU WORRIED THAT YOUR FOOD WOULD RUN OUT BEFORE YOU GOT MONEY TO BUY MORE.: PATIENT DECLINED

## 2023-08-09 SDOH — ECONOMIC STABILITY: FOOD INSECURITY: WITHIN THE PAST 12 MONTHS, THE FOOD YOU BOUGHT JUST DIDN'T LAST AND YOU DIDN'T HAVE MONEY TO GET MORE.: PATIENT DECLINED

## 2023-08-09 SDOH — ECONOMIC STABILITY: HOUSING INSECURITY
IN THE LAST 12 MONTHS, WAS THERE A TIME WHEN YOU DID NOT HAVE A STEADY PLACE TO SLEEP OR SLEPT IN A SHELTER (INCLUDING NOW)?: NO

## 2023-08-09 SDOH — ECONOMIC STABILITY: INCOME INSECURITY: HOW HARD IS IT FOR YOU TO PAY FOR THE VERY BASICS LIKE FOOD, HOUSING, MEDICAL CARE, AND HEATING?: NOT HARD AT ALL

## 2023-08-09 SDOH — ECONOMIC STABILITY: INCOME INSECURITY: HOW HARD IS IT FOR YOU TO PAY FOR THE VERY BASICS LIKE FOOD, HOUSING, MEDICAL CARE, AND HEATING?: PATIENT DECLINED

## 2023-08-09 SDOH — ECONOMIC STABILITY: FOOD INSECURITY: WITHIN THE PAST 12 MONTHS, THE FOOD YOU BOUGHT JUST DIDN'T LAST AND YOU DIDN'T HAVE MONEY TO GET MORE.: NEVER TRUE

## 2023-08-09 SDOH — ECONOMIC STABILITY: TRANSPORTATION INSECURITY
IN THE PAST 12 MONTHS, HAS LACK OF TRANSPORTATION KEPT YOU FROM MEETINGS, WORK, OR FROM GETTING THINGS NEEDED FOR DAILY LIVING?: NO

## 2023-08-09 ASSESSMENT — PATIENT HEALTH QUESTIONNAIRE - PHQ9
3. TROUBLE FALLING OR STAYING ASLEEP: 0
SUM OF ALL RESPONSES TO PHQ QUESTIONS 1-9: 1
10. IF YOU CHECKED OFF ANY PROBLEMS, HOW DIFFICULT HAVE THESE PROBLEMS MADE IT FOR YOU TO DO YOUR WORK, TAKE CARE OF THINGS AT HOME, OR GET ALONG WITH OTHER PEOPLE: 1
5. POOR APPETITE OR OVEREATING: 0
SUM OF ALL RESPONSES TO PHQ QUESTIONS 1-9: 1
8. MOVING OR SPEAKING SO SLOWLY THAT OTHER PEOPLE COULD HAVE NOTICED. OR THE OPPOSITE, BEING SO FIGETY OR RESTLESS THAT YOU HAVE BEEN MOVING AROUND A LOT MORE THAN USUAL: 0
9. THOUGHTS THAT YOU WOULD BE BETTER OFF DEAD, OR OF HURTING YOURSELF: 0
7. TROUBLE CONCENTRATING ON THINGS, SUCH AS READING THE NEWSPAPER OR WATCHING TELEVISION: 0
SUM OF ALL RESPONSES TO PHQ QUESTIONS 1-9: 1
6. FEELING BAD ABOUT YOURSELF - OR THAT YOU ARE A FAILURE OR HAVE LET YOURSELF OR YOUR FAMILY DOWN: 0
4. FEELING TIRED OR HAVING LITTLE ENERGY: 0
1. LITTLE INTEREST OR PLEASURE IN DOING THINGS: 1
SUM OF ALL RESPONSES TO PHQ QUESTIONS 1-9: 1
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
## 2023-08-17 ENCOUNTER — HOSPITAL ENCOUNTER (OUTPATIENT)
Facility: HOSPITAL | Age: 53
Discharge: HOME | End: 2023-08-17
Payer: COMMERCIAL

## 2023-08-17 DIAGNOSIS — M25.561 ACUTE PAIN OF RIGHT KNEE: ICD-10-CM

## 2023-08-17 PROCEDURE — 73565 X-RAY EXAM OF KNEES: CPT

## 2023-08-18 LAB
25(OH)D3+25(OH)D2 SERPL-MCNC: 10.8 NG/ML (ref 30–100)
ALBUMIN SERPL-MCNC: 4.5 G/DL (ref 3.8–4.9)
ALBUMIN/GLOB SERPL: 1.7 {RATIO} (ref 1.2–2.2)
ALP SERPL-CCNC: 91 IU/L (ref 44–121)
ALT SERPL-CCNC: 15 IU/L (ref 0–32)
AST SERPL-CCNC: 17 IU/L (ref 0–40)
BILIRUB SERPL-MCNC: 0.6 MG/DL (ref 0–1.2)
BUN SERPL-MCNC: 11 MG/DL (ref 6–24)
BUN/CREAT SERPL: 11 (ref 9–23)
CALCIUM SERPL-MCNC: 10.5 MG/DL (ref 8.7–10.2)
CHLORIDE SERPL-SCNC: 100 MMOL/L (ref 96–106)
CHOLEST SERPL-MCNC: 233 MG/DL (ref 100–199)
CO2 SERPL-SCNC: 25 MMOL/L (ref 20–29)
CREAT SERPL-MCNC: 0.97 MG/DL (ref 0.57–1)
EGFRCR SERPLBLD CKD-EPI 2021: 69 ML/MIN/1.73
GLOBULIN SER CALC-MCNC: 2.6 G/DL (ref 1.5–4.5)
GLUCOSE SERPL-MCNC: 108 MG/DL (ref 70–99)
HBA1C MFR BLD: 5.7 % (ref 4.8–5.6)
HDLC SERPL-MCNC: 52 MG/DL
IMP & REVIEW OF LAB RESULTS: NORMAL
LDLC SERPL CALC-MCNC: 164 MG/DL (ref 0–99)
POTASSIUM SERPL-SCNC: 4 MMOL/L (ref 3.5–5.2)
PROT SERPL-MCNC: 7.1 G/DL (ref 6–8.5)
REPORT: NORMAL
SODIUM SERPL-SCNC: 141 MMOL/L (ref 134–144)
T4 FREE SERPL-MCNC: 2.32 NG/DL (ref 0.82–1.77)
T4 FREE SERPL-MCNC: 2.47 NG/DL (ref 0.82–1.77)
T4 SERPL-MCNC: 14.6 UG/DL (ref 4.5–12)
TRIGL SERPL-MCNC: 96 MG/DL (ref 0–149)
TSH SERPL DL<=0.005 MIU/L-ACNC: 0.02 UIU/ML (ref 0.45–4.5)
TSH SERPL DL<=0.005 MIU/L-ACNC: 0.03 UIU/ML (ref 0.45–4.5)
VLDLC SERPL CALC-MCNC: 17 MG/DL (ref 5–40)

## 2023-09-06 DIAGNOSIS — I12.9 HYPERTENSIVE CHRONIC KIDNEY DISEASE WITH STAGE 1 THROUGH STAGE 4 CHRONIC KIDNEY DISEASE, OR UNSPECIFIED CHRONIC KIDNEY DISEASE: ICD-10-CM

## 2023-09-10 ENCOUNTER — PATIENT MESSAGE (OUTPATIENT)
Facility: CLINIC | Age: 53
End: 2023-09-10

## 2023-09-10 DIAGNOSIS — I12.9 HYPERTENSIVE CHRONIC KIDNEY DISEASE WITH STAGE 1 THROUGH STAGE 4 CHRONIC KIDNEY DISEASE, OR UNSPECIFIED CHRONIC KIDNEY DISEASE: ICD-10-CM

## 2023-09-11 RX ORDER — INDAPAMIDE 2.5 MG/1
TABLET ORAL
Qty: 180 TABLET | Refills: 2 | Status: SHIPPED | OUTPATIENT
Start: 2023-09-11 | End: 2023-09-12 | Stop reason: SDUPTHER

## 2023-09-11 NOTE — TELEPHONE ENCOUNTER
From: Paul Martinez  To: Dr. Ralph Saldivar  Sent: 9/10/2023 12:50 PM EDT  Subject: Refill needed Indapamide     Please send pharmacy a script for this medication. They stated they have not received a response for a refill.  Thanks

## 2023-09-12 RX ORDER — INDAPAMIDE 2.5 MG/1
5 TABLET ORAL DAILY
Qty: 180 TABLET | Refills: 3 | Status: SHIPPED | OUTPATIENT
Start: 2023-09-12

## 2023-10-26 ENCOUNTER — OFFICE VISIT (OUTPATIENT)
Age: 53
End: 2023-10-26
Payer: COMMERCIAL

## 2023-10-26 VITALS
WEIGHT: 251.6 LBS | HEIGHT: 64 IN | BODY MASS INDEX: 42.95 KG/M2 | HEART RATE: 118 BPM | DIASTOLIC BLOOD PRESSURE: 81 MMHG | SYSTOLIC BLOOD PRESSURE: 103 MMHG

## 2023-10-26 DIAGNOSIS — E89.0 POSTABLATIVE HYPOTHYROIDISM: ICD-10-CM

## 2023-10-26 DIAGNOSIS — E89.0 POSTABLATIVE HYPOTHYROIDISM: Primary | ICD-10-CM

## 2023-10-26 PROCEDURE — 99214 OFFICE O/P EST MOD 30 MIN: CPT | Performed by: INTERNAL MEDICINE

## 2023-10-26 RX ORDER — BUSPIRONE HYDROCHLORIDE 7.5 MG/1
7.5 TABLET ORAL 3 TIMES DAILY
COMMUNITY
Start: 2023-09-27
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
## 2023-10-27 DIAGNOSIS — E03.9 ACQUIRED HYPOTHYROIDISM: Primary | ICD-10-CM

## 2023-10-27 LAB
T4 FREE SERPL-MCNC: 2.3 NG/DL (ref 0.8–1.5)
T4 SERPL-MCNC: 20.5 UG/DL (ref 4.8–13.9)
TSH SERPL DL<=0.05 MIU/L-ACNC: <0.01 UIU/ML (ref 0.36–3.74)

## 2023-10-27 RX ORDER — LEVOTHYROXINE SODIUM 0.12 MG/1
125 TABLET ORAL
Qty: 30 TABLET | Refills: 1 | Status: SHIPPED | OUTPATIENT
Start: 2023-10-27

## 2023-11-27 RX ORDER — LEVOTHYROXINE SODIUM 300 UG/1
300 TABLET ORAL
Qty: 90 TABLET | Refills: 1 | OUTPATIENT
Start: 2023-11-27

## 2023-11-29 DIAGNOSIS — F41.9 ANXIETY DISORDER, UNSPECIFIED: ICD-10-CM

## 2023-11-29 RX ORDER — BUSPIRONE HYDROCHLORIDE 7.5 MG/1
7.5 TABLET ORAL 3 TIMES DAILY
Qty: 90 TABLET | Refills: 5 | OUTPATIENT
Start: 2023-11-29

## 2023-12-13 ENCOUNTER — OFFICE VISIT (OUTPATIENT)
Facility: CLINIC | Age: 53
End: 2023-12-13
Payer: COMMERCIAL

## 2023-12-13 VITALS
SYSTOLIC BLOOD PRESSURE: 132 MMHG | OXYGEN SATURATION: 99 % | RESPIRATION RATE: 18 BRPM | TEMPERATURE: 98.2 F | HEART RATE: 72 BPM | WEIGHT: 249.5 LBS | HEIGHT: 64 IN | DIASTOLIC BLOOD PRESSURE: 83 MMHG | BODY MASS INDEX: 42.59 KG/M2

## 2023-12-13 DIAGNOSIS — L03.213 PERIORBITAL CELLULITIS OF RIGHT EYE: Primary | ICD-10-CM

## 2023-12-13 PROCEDURE — 99214 OFFICE O/P EST MOD 30 MIN: CPT | Performed by: INTERNAL MEDICINE

## 2023-12-13 RX ORDER — LEVOFLOXACIN 500 MG/1
500 TABLET, FILM COATED ORAL DAILY
Qty: 7 TABLET | Refills: 0 | Status: SHIPPED | OUTPATIENT
Start: 2023-12-13 | End: 2023-12-20

## 2023-12-13 ASSESSMENT — PATIENT HEALTH QUESTIONNAIRE - PHQ9
4. FEELING TIRED OR HAVING LITTLE ENERGY: 0
3. TROUBLE FALLING OR STAYING ASLEEP: 0
SUM OF ALL RESPONSES TO PHQ QUESTIONS 1-9: 0
10. IF YOU CHECKED OFF ANY PROBLEMS, HOW DIFFICULT HAVE THESE PROBLEMS MADE IT FOR YOU TO DO YOUR WORK, TAKE CARE OF THINGS AT HOME, OR GET ALONG WITH OTHER PEOPLE: 0
2. FEELING DOWN, DEPRESSED OR HOPELESS: 0
1. LITTLE INTEREST OR PLEASURE IN DOING THINGS: 0
6. FEELING BAD ABOUT YOURSELF - OR THAT YOU ARE A FAILURE OR HAVE LET YOURSELF OR YOUR FAMILY DOWN: 0
SUM OF ALL RESPONSES TO PHQ QUESTIONS 1-9: 0
9. THOUGHTS THAT YOU WOULD BE BETTER OFF DEAD, OR OF HURTING YOURSELF: 0
5. POOR APPETITE OR OVEREATING: 0
8. MOVING OR SPEAKING SO SLOWLY THAT OTHER PEOPLE COULD HAVE NOTICED. OR THE OPPOSITE, BEING SO FIGETY OR RESTLESS THAT YOU HAVE BEEN MOVING AROUND A LOT MORE THAN USUAL: 0
SUM OF ALL RESPONSES TO PHQ9 QUESTIONS 1 & 2: 0
7. TROUBLE CONCENTRATING ON THINGS, SUCH AS READING THE NEWSPAPER OR WATCHING TELEVISION: 0

## 2023-12-13 NOTE — PROGRESS NOTES
Chief Complaint   Patient presents with    Eye Problem     Started 12/09/2023 right eye red and swollen been getting worse daily patient denies pain itching burning. Pt states she do wear contacts. 1. \"Have you been to the ER, urgent care clinic since your last visit? Hospitalized since your last visit? \" No    2. \"Have you seen or consulted any other health care providers outside of the [address] [place] [address] since your last visit? \"  No    3. For patients aged 43-73: Has the patient had a colonoscopy / FIT/ Cologuard? Yes      If the patient is female:    4. For patients aged 43-66: Has the patient had a mammogram within the past 2 years? Yes      5. For patients aged 21-65: Has the patient had a pap smear?  Yes
throat,nasal congestion,ear pains. hoarseness  Respiratory:  negative for cough, hemoptysis, dyspnea,wheezing  CV:   negative for chest pain, palpitations, lower extremity edema  GI:   negative for nausea, vomiting, diarrhea, abdominal pain,melena  Neurological:  negative for headaches, dizziness, vertigo, memory problems and gait     Past Medical History:   Diagnosis Date    Chronic kidney disease     kidney failure-left    Depression     GERD (gastroesophageal reflux disease)     Grave's disease     Hypertension     Hypothyroidism following radioiodine therapy     2009    Liver disease     Morbid obesity ([address])     Nausea & vomiting     Obstructive sleep apnea (adult) (pediatric) 1/8/2013    Thyroid disease      Past Surgical History:   Procedure Laterality Date    COLONOSCOPY N/A 7/12/2019    COLONOSCOPY performed by Malina Barrera MD at [facility] ENDOSCOPY    HX CHOLECYSTECTOMY      HX GYN  2005    novasure    HX ORTHOPAEDIC      Left arm    HX UROLOGICAL      bladder sling     Social History     Socioeconomic History    Marital status: SINGLE   Tobacco Use    Smoking status: Light Smoker     Packs/day: 0.25     Types: Cigarettes    Smokeless tobacco: Never    Tobacco comments:     2 cigs once a month   Vaping Use    Vaping Use: Never used   Substance and Sexual Activity    Alcohol use: Not Currently     Comment: rare, 1 drink a month    Drug use: No    Sexual activity: Not Currently     Social Determinants of Health     Financial Resource Strain: Low Risk     Difficulty of Paying Living Expenses: Not hard at all   Food Insecurity: No Food Insecurity    Worried About Running Out of Food in the Last Year: Never true    Ran Out of Food in the Last Year: Never true     Family History   Problem Relation Age of Onset    Hypertension Mother     Emphysema Mother     Hypertension Father     Other Father     Heart Attack Father     Stroke Father     Osteoporosis Father     Diabetes Sister     Hypertension Brother

## 2023-12-14 ENCOUNTER — TELEPHONE (OUTPATIENT)
Facility: CLINIC | Age: 53
End: 2023-12-14

## 2023-12-18 DIAGNOSIS — E03.9 ACQUIRED HYPOTHYROIDISM: ICD-10-CM

## 2023-12-27 RX ORDER — LEVOTHYROXINE SODIUM 300 UG/1
300 TABLET ORAL
Qty: 50 TABLET | Refills: 3 | Status: SHIPPED | OUTPATIENT
Start: 2023-12-27

## 2024-01-15 LAB
BILIRUBIN, URINE, POC: NEGATIVE
BLOOD URINE, POC: ABNORMAL
GLUCOSE URINE, POC: NEGATIVE MG/DL
KETONES, URINE, POC: NEGATIVE MG/DL
LEUKOCYTE ESTERASE, URINE, POC: ABNORMAL
NITRITE, URINE, POC: POSITIVE
PH, URINE, POC: 7 (ref 4.6–8)
PROTEIN,URINE, POC: 30 MG/DL
SPECIFIC GRAVITY, URINE, POC: 1.02 (ref 1–1.03)
URINALYSIS CLARITY, POC: ABNORMAL
URINALYSIS COLOR, POC: YELLOW
UROBILINOGEN, POC: ABNORMAL MG/DL

## 2024-01-18 RX ORDER — BUSPIRONE HYDROCHLORIDE 7.5 MG/1
7.5 TABLET ORAL 3 TIMES DAILY
Qty: 90 TABLET | Refills: 2 | Status: SHIPPED | OUTPATIENT
Start: 2024-01-18

## 2024-02-09 ENCOUNTER — OFFICE VISIT (OUTPATIENT)
Facility: CLINIC | Age: 54
End: 2024-02-09
Payer: MEDICAID

## 2024-02-09 VITALS
OXYGEN SATURATION: 96 % | HEIGHT: 64 IN | SYSTOLIC BLOOD PRESSURE: 138 MMHG | BODY MASS INDEX: 43.54 KG/M2 | HEART RATE: 69 BPM | WEIGHT: 255 LBS | TEMPERATURE: 98.1 F | RESPIRATION RATE: 18 BRPM | DIASTOLIC BLOOD PRESSURE: 84 MMHG

## 2024-02-09 DIAGNOSIS — R73.03 PREDIABETES: ICD-10-CM

## 2024-02-09 DIAGNOSIS — G47.33 OSA (OBSTRUCTIVE SLEEP APNEA): ICD-10-CM

## 2024-02-09 DIAGNOSIS — N18.30 STAGE 3 CHRONIC KIDNEY DISEASE, UNSPECIFIED WHETHER STAGE 3A OR 3B CKD (HCC): ICD-10-CM

## 2024-02-09 DIAGNOSIS — I12.9 HYPERTENSION, RENAL DISEASE, STAGE 1-4 OR UNSPECIFIED CHRONIC KIDNEY DISEASE: Primary | ICD-10-CM

## 2024-02-09 DIAGNOSIS — F41.9 ANXIETY DISORDER, UNSPECIFIED TYPE: ICD-10-CM

## 2024-02-09 DIAGNOSIS — E89.0 HYPOTHYROIDISM FOLLOWING RADIOIODINE THERAPY: ICD-10-CM

## 2024-02-09 DIAGNOSIS — E66.01 MORBID OBESITY (HCC): ICD-10-CM

## 2024-02-09 PROCEDURE — 99214 OFFICE O/P EST MOD 30 MIN: CPT | Performed by: INTERNAL MEDICINE

## 2024-02-09 RX ORDER — LEVOTHYROXINE SODIUM 0.12 MG/1
125 TABLET ORAL DAILY
COMMUNITY

## 2024-02-09 ASSESSMENT — ANXIETY QUESTIONNAIRES
4. TROUBLE RELAXING: 3
IF YOU CHECKED OFF ANY PROBLEMS ON THIS QUESTIONNAIRE, HOW DIFFICULT HAVE THESE PROBLEMS MADE IT FOR YOU TO DO YOUR WORK, TAKE CARE OF THINGS AT HOME, OR GET ALONG WITH OTHER PEOPLE: VERY DIFFICULT
2. NOT BEING ABLE TO STOP OR CONTROL WORRYING: 3
1. FEELING NERVOUS, ANXIOUS, OR ON EDGE: 3
6. BECOMING EASILY ANNOYED OR IRRITABLE: 2
7. FEELING AFRAID AS IF SOMETHING AWFUL MIGHT HAPPEN: 2
5. BEING SO RESTLESS THAT IT IS HARD TO SIT STILL: 1
3. WORRYING TOO MUCH ABOUT DIFFERENT THINGS: 3
GAD7 TOTAL SCORE: 17

## 2024-02-09 ASSESSMENT — PATIENT HEALTH QUESTIONNAIRE - PHQ9
1. LITTLE INTEREST OR PLEASURE IN DOING THINGS: 2
4. FEELING TIRED OR HAVING LITTLE ENERGY: 3
3. TROUBLE FALLING OR STAYING ASLEEP: 0
SUM OF ALL RESPONSES TO PHQ QUESTIONS 1-9: 10
2. FEELING DOWN, DEPRESSED OR HOPELESS: 2
10. IF YOU CHECKED OFF ANY PROBLEMS, HOW DIFFICULT HAVE THESE PROBLEMS MADE IT FOR YOU TO DO YOUR WORK, TAKE CARE OF THINGS AT HOME, OR GET ALONG WITH OTHER PEOPLE: 1
SUM OF ALL RESPONSES TO PHQ QUESTIONS 1-9: 10
9. THOUGHTS THAT YOU WOULD BE BETTER OFF DEAD, OR OF HURTING YOURSELF: 0
SUM OF ALL RESPONSES TO PHQ QUESTIONS 1-9: 10
SUM OF ALL RESPONSES TO PHQ9 QUESTIONS 1 & 2: 4
6. FEELING BAD ABOUT YOURSELF - OR THAT YOU ARE A FAILURE OR HAVE LET YOURSELF OR YOUR FAMILY DOWN: 2
5. POOR APPETITE OR OVEREATING: 0
7. TROUBLE CONCENTRATING ON THINGS, SUCH AS READING THE NEWSPAPER OR WATCHING TELEVISION: 1
8. MOVING OR SPEAKING SO SLOWLY THAT OTHER PEOPLE COULD HAVE NOTICED. OR THE OPPOSITE, BEING SO FIGETY OR RESTLESS THAT YOU HAVE BEEN MOVING AROUND A LOT MORE THAN USUAL: 0
SUM OF ALL RESPONSES TO PHQ QUESTIONS 1-9: 10

## 2024-02-09 NOTE — PROGRESS NOTES
Chief Complaint   Patient presents with    6 Month Follow-Up     1. \"Have you been to the ER, urgent care clinic since your last visit?  Hospitalized since your last visit?\" No    2. \"Have you seen or consulted any other health care providers outside of the [facility] since your last visit?\"  No    3. For patients aged 45-75: Has the patient had a colonoscopy / FIT/ Cologuard? Yes      If the patient is female:    4. For patients aged 40-74: Has the patient had a mammogram within the past 2 years? Yes      5. For patients aged 21-65: Has the patient had a pap smear? Yes      HIPPA confirmed by two patient identifiers.

## 2024-02-09 NOTE — PROGRESS NOTES
Jud Patel is a 52 y.o. female and presents with   Chief Complaint   Patient presents with    6 Month Follow-Up      .  Subjective:    Pt is living w 2/4 grandbabies.    PMH-HTN-on indapamide 5mg ONLY     BP Readings from Last 3 Encounters:   02/09/24 138/84   12/13/23 132/83   10/26/23 103/81       Prediabetes  Hemoglobin A1C   Date Value Ref Range Status   08/17/2023 5.7 (H) 4.8 - 5.6 % Final     Comment:              Prediabetes: 5.7 - 6.4           Diabetes: >6.4           Glycemic control for adults with diabetes: <7.0          Hypothyroidism-pt is currently on 125mcg levothyroxine     -pt  is followed by endocrinology Dr. Ashton     Lab Results   Component Value Date    TSH 0.026 (L) 08/17/2023    Q1MCOQW 20.5 (H) 10/26/2023    T4FREE 2.3 (H) 10/26/2023         ANNABELLA-pt uses CPAP     Vit d def-  Lab Results   Component Value Date/Time    VITD25 10.8 08/17/2023 11:03 AM         H/o cigarette smoker- quit 2019      Anxiety-MUCH improved on buspar     Morbid obesity-  Wt Readings from Last 3 Encounters:   02/09/24 115.7 kg (255 lb)   12/13/23 113.2 kg (249 lb 8 oz)   10/26/23 114.1 kg (251 lb 9.6 oz)        Chronic lbp w radiculopathy-pt reports that gabapentin 300mg TID relieves her back pain, but not the neuropathy    -pt declines maxing dose of gabapentin due to SE     -pt declines pain management referral      H/o CKD 3-  Lab Results   Component Value Date    CREATININE 0.97 08/17/2023    BUN 11 08/17/2023     08/17/2023    K 4.0 08/17/2023     08/17/2023    CO2 25 08/17/2023        Review of Systems  Constitutional: negative for fevers, chills, anorexia and weight loss  Eyes:   negative for visual disturbance and irritation  ENT:   negative for tinnitus,sore throat,nasal congestion,ear pains.hoarseness  Respiratory:  negative for cough, hemoptysis, dyspnea,wheezing  CV:   negative for chest pain, palpitations, lower extremity edema  GI:   negative for nausea, vomiting, diarrhea,

## 2024-02-20 DIAGNOSIS — I12.9 HYPERTENSIVE CHRONIC KIDNEY DISEASE WITH STAGE 1 THROUGH STAGE 4 CHRONIC KIDNEY DISEASE, OR UNSPECIFIED CHRONIC KIDNEY DISEASE: ICD-10-CM

## 2024-02-20 RX ORDER — AMLODIPINE BESYLATE 5 MG/1
5 TABLET ORAL DAILY
Qty: 90 TABLET | Refills: 3 | Status: SHIPPED | OUTPATIENT
Start: 2024-02-20

## 2024-02-20 RX ORDER — LEVOTHYROXINE SODIUM 0.12 MG/1
125 TABLET ORAL DAILY
Qty: 90 TABLET | Refills: 1 | Status: SHIPPED | OUTPATIENT
Start: 2024-02-20

## 2024-02-27 ENCOUNTER — OFFICE VISIT (OUTPATIENT)
Age: 54
End: 2024-02-27
Payer: MEDICAID

## 2024-02-27 VITALS
DIASTOLIC BLOOD PRESSURE: 57 MMHG | SYSTOLIC BLOOD PRESSURE: 130 MMHG | HEIGHT: 64 IN | WEIGHT: 258.8 LBS | HEART RATE: 68 BPM | BODY MASS INDEX: 44.18 KG/M2

## 2024-02-27 DIAGNOSIS — E89.0 POSTABLATIVE HYPOTHYROIDISM: Primary | ICD-10-CM

## 2024-02-27 PROCEDURE — 99213 OFFICE O/P EST LOW 20 MIN: CPT | Performed by: INTERNAL MEDICINE

## 2024-02-27 PROCEDURE — G2211 COMPLEX E/M VISIT ADD ON: HCPCS | Performed by: INTERNAL MEDICINE
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
## 2024-02-28 DIAGNOSIS — E03.9 ACQUIRED HYPOTHYROIDISM: Primary | ICD-10-CM

## 2024-02-28 LAB
25(OH)D3+25(OH)D2 SERPL-MCNC: 13.6 NG/ML (ref 30–100)
ALBUMIN SERPL-MCNC: 4.2 G/DL (ref 3.8–4.9)
ALBUMIN/GLOB SERPL: 1.8 {RATIO} (ref 1.2–2.2)
ALP SERPL-CCNC: 88 IU/L (ref 44–121)
ALT SERPL-CCNC: 24 IU/L (ref 0–32)
AST SERPL-CCNC: 19 IU/L (ref 0–40)
BILIRUB SERPL-MCNC: 0.4 MG/DL (ref 0–1.2)
BUN SERPL-MCNC: 10 MG/DL (ref 6–24)
BUN/CREAT SERPL: 11 (ref 9–23)
CALCIUM SERPL-MCNC: 10.3 MG/DL (ref 8.7–10.2)
CHLORIDE SERPL-SCNC: 99 MMOL/L (ref 96–106)
CHOLEST SERPL-MCNC: 191 MG/DL (ref 100–199)
CO2 SERPL-SCNC: 25 MMOL/L (ref 20–29)
CREAT SERPL-MCNC: 0.94 MG/DL (ref 0.57–1)
EGFRCR SERPLBLD CKD-EPI 2021: 72 ML/MIN/1.73
GLOBULIN SER CALC-MCNC: 2.4 G/DL (ref 1.5–4.5)
GLUCOSE SERPL-MCNC: 136 MG/DL (ref 70–99)
HBA1C MFR BLD: 5.8 % (ref 4.8–5.6)
HDLC SERPL-MCNC: 49 MG/DL
IMP & REVIEW OF LAB RESULTS: NORMAL
LDLC SERPL CALC-MCNC: 122 MG/DL (ref 0–99)
POTASSIUM SERPL-SCNC: 4.1 MMOL/L (ref 3.5–5.2)
PROT SERPL-MCNC: 6.6 G/DL (ref 6–8.5)
SODIUM SERPL-SCNC: 139 MMOL/L (ref 134–144)
T4 FREE SERPL-MCNC: 2.72 NG/DL (ref 0.82–1.77)
T4 FREE SERPL-MCNC: 2.95 NG/DL (ref 0.82–1.77)
T4 SERPL-MCNC: 14.4 UG/DL (ref 4.5–12)
TRIGL SERPL-MCNC: 109 MG/DL (ref 0–149)
TSH SERPL DL<=0.005 MIU/L-ACNC: <0.005 UIU/ML (ref 0.45–4.5)
TSH SERPL DL<=0.005 MIU/L-ACNC: <0.005 UIU/ML (ref 0.45–4.5)
VLDLC SERPL CALC-MCNC: 20 MG/DL (ref 5–40)

## 2024-02-28 RX ORDER — LEVOTHYROXINE SODIUM 0.1 MG/1
100 TABLET ORAL DAILY
Qty: 90 TABLET | Refills: 0 | Status: SHIPPED | OUTPATIENT
Start: 2024-02-28

## 2024-02-29 DIAGNOSIS — F32.9 MAJOR DEPRESSIVE DISORDER, SINGLE EPISODE, UNSPECIFIED: ICD-10-CM

## 2024-02-29 RX ORDER — SERTRALINE HYDROCHLORIDE 100 MG/1
TABLET, FILM COATED ORAL
Qty: 135 TABLET | Refills: 1 | Status: SHIPPED | OUTPATIENT
Start: 2024-02-29

## 2024-03-18 DIAGNOSIS — E55.9 VITAMIN D DEFICIENCY: Primary | ICD-10-CM

## 2024-03-18 RX ORDER — ERGOCALCIFEROL 1.25 MG/1
50000 CAPSULE ORAL WEEKLY
Qty: 12 CAPSULE | Refills: 0 | Status: SHIPPED | OUTPATIENT
Start: 2024-03-18

## 2024-04-10 DIAGNOSIS — E03.9 ACQUIRED HYPOTHYROIDISM: ICD-10-CM

## 2024-05-21 ENCOUNTER — TELEPHONE (OUTPATIENT)
Age: 54
End: 2024-05-21

## 2024-05-21 NOTE — TELEPHONE ENCOUNTER
Romaine from [address] [place] [address] called and LVM 5/21 @ 12:02 pm.    They received a referral that came from dr Hsu. In order for them to get a PA, they need her most recent tsh, free t3, free t4, metabolic A1c, and hemoglobin blood glucose. This can be sent att romaine.    Fax# [phone] fax# [phone]

## 2024-05-22 NOTE — TELEPHONE ENCOUNTER
Spoke with Jannette. She states that she needs most recent thyroid labs and A1C. She states that either fax number goes to the same place. Advised will fax labs to the [phone].

## 2024-06-05 DIAGNOSIS — E03.9 ACQUIRED HYPOTHYROIDISM: ICD-10-CM

## 2024-06-05 RX ORDER — LEVOTHYROXINE SODIUM 0.1 MG/1
100 TABLET ORAL DAILY
Qty: 90 TABLET | Refills: 1 | Status: SHIPPED | OUTPATIENT
Start: 2024-06-05

## 2024-06-06 RX ORDER — LEVOTHYROXINE SODIUM 0.12 MG/1
125 TABLET ORAL DAILY
Qty: 90 TABLET | Refills: 1 | OUTPATIENT
Start: 2024-06-06

## 2024-08-09 ENCOUNTER — OFFICE VISIT (OUTPATIENT)
Facility: CLINIC | Age: 54
End: 2024-08-09
Payer: MEDICAID

## 2024-08-09 VITALS
SYSTOLIC BLOOD PRESSURE: 194 MMHG | TEMPERATURE: 97.8 F | OXYGEN SATURATION: 96 % | WEIGHT: 264.5 LBS | DIASTOLIC BLOOD PRESSURE: 110 MMHG | RESPIRATION RATE: 20 BRPM | BODY MASS INDEX: 45.16 KG/M2 | HEIGHT: 64 IN | HEART RATE: 66 BPM

## 2024-08-09 DIAGNOSIS — H91.90 HEARING LOSS, UNSPECIFIED HEARING LOSS TYPE, UNSPECIFIED LATERALITY: ICD-10-CM

## 2024-08-09 DIAGNOSIS — H05.20 EXOPHTHALMOS: ICD-10-CM

## 2024-08-09 DIAGNOSIS — Z12.31 ENCOUNTER FOR SCREENING MAMMOGRAM FOR MALIGNANT NEOPLASM OF BREAST: ICD-10-CM

## 2024-08-09 DIAGNOSIS — E66.01 MORBID OBESITY (HCC): ICD-10-CM

## 2024-08-09 DIAGNOSIS — R73.03 PREDIABETES: ICD-10-CM

## 2024-08-09 DIAGNOSIS — I10 PRIMARY HYPERTENSION: Primary | ICD-10-CM

## 2024-08-09 DIAGNOSIS — G47.33 OSA (OBSTRUCTIVE SLEEP APNEA): ICD-10-CM

## 2024-08-09 DIAGNOSIS — E89.0 HYPOTHYROIDISM FOLLOWING RADIOIODINE THERAPY: ICD-10-CM

## 2024-08-09 DIAGNOSIS — F32.9 MAJOR DEPRESSIVE DISORDER WITH SINGLE EPISODE, REMISSION STATUS UNSPECIFIED: ICD-10-CM

## 2024-08-09 PROBLEM — I12.9 HYPERTENSION, RENAL DISEASE, STAGE 1-4 OR UNSPECIFIED CHRONIC KIDNEY DISEASE: Status: RESOLVED | Noted: 2019-03-08 | Resolved: 2024-08-09

## 2024-08-09 PROCEDURE — 3080F DIAST BP >= 90 MM HG: CPT | Performed by: INTERNAL MEDICINE

## 2024-08-09 PROCEDURE — 3077F SYST BP >= 140 MM HG: CPT | Performed by: INTERNAL MEDICINE

## 2024-08-09 PROCEDURE — 99214 OFFICE O/P EST MOD 30 MIN: CPT | Performed by: INTERNAL MEDICINE

## 2024-08-09 RX ORDER — SERTRALINE HYDROCHLORIDE 100 MG/1
TABLET, FILM COATED ORAL
Qty: 135 TABLET | Refills: 1 | Status: SHIPPED | OUTPATIENT
Start: 2024-08-09

## 2024-08-09 RX ORDER — TEPROTUMUMAB 500 MG/1
10 INJECTION, POWDER, LYOPHILIZED, FOR SOLUTION INTRAVENOUS ONCE
COMMUNITY

## 2024-08-09 RX ORDER — BUSPIRONE HYDROCHLORIDE 7.5 MG/1
7.5 TABLET ORAL 3 TIMES DAILY
Qty: 270 TABLET | Refills: 1 | Status: SHIPPED | OUTPATIENT
Start: 2024-08-09

## 2024-08-09 RX ORDER — CYCLOBENZAPRINE HCL 10 MG
10 TABLET ORAL 3 TIMES DAILY PRN
Qty: 30 TABLET | Refills: 1 | Status: SHIPPED | OUTPATIENT
Start: 2024-08-09

## 2024-08-09 RX ORDER — AMLODIPINE BESYLATE 5 MG/1
5 TABLET ORAL DAILY
Qty: 90 TABLET | Refills: 3 | Status: SHIPPED | OUTPATIENT
Start: 2024-08-09

## 2024-08-09 RX ORDER — INDAPAMIDE 2.5 MG/1
5 TABLET ORAL DAILY
Qty: 180 TABLET | Refills: 3 | Status: SHIPPED | OUTPATIENT
Start: 2024-08-09

## 2024-08-09 SDOH — ECONOMIC STABILITY: FOOD INSECURITY: WITHIN THE PAST 12 MONTHS, YOU WORRIED THAT YOUR FOOD WOULD RUN OUT BEFORE YOU GOT MONEY TO BUY MORE.: NEVER TRUE

## 2024-08-09 SDOH — ECONOMIC STABILITY: FOOD INSECURITY: WITHIN THE PAST 12 MONTHS, THE FOOD YOU BOUGHT JUST DIDN'T LAST AND YOU DIDN'T HAVE MONEY TO GET MORE.: NEVER TRUE

## 2024-08-09 SDOH — ECONOMIC STABILITY: INCOME INSECURITY: HOW HARD IS IT FOR YOU TO PAY FOR THE VERY BASICS LIKE FOOD, HOUSING, MEDICAL CARE, AND HEATING?: NOT HARD AT ALL

## 2024-08-09 ASSESSMENT — PATIENT HEALTH QUESTIONNAIRE - PHQ9
7. TROUBLE CONCENTRATING ON THINGS, SUCH AS READING THE NEWSPAPER OR WATCHING TELEVISION: MORE THAN HALF THE DAYS
SUM OF ALL RESPONSES TO PHQ QUESTIONS 1-9: 10
8. MOVING OR SPEAKING SO SLOWLY THAT OTHER PEOPLE COULD HAVE NOTICED. OR THE OPPOSITE, BEING SO FIGETY OR RESTLESS THAT YOU HAVE BEEN MOVING AROUND A LOT MORE THAN USUAL: NOT AT ALL
2. FEELING DOWN, DEPRESSED OR HOPELESS: MORE THAN HALF THE DAYS
10. IF YOU CHECKED OFF ANY PROBLEMS, HOW DIFFICULT HAVE THESE PROBLEMS MADE IT FOR YOU TO DO YOUR WORK, TAKE CARE OF THINGS AT HOME, OR GET ALONG WITH OTHER PEOPLE: VERY DIFFICULT
9. THOUGHTS THAT YOU WOULD BE BETTER OFF DEAD, OR OF HURTING YOURSELF: NOT AT ALL
SUM OF ALL RESPONSES TO PHQ QUESTIONS 1-9: 10
SUM OF ALL RESPONSES TO PHQ9 QUESTIONS 1 & 2: 2
SUM OF ALL RESPONSES TO PHQ QUESTIONS 1-9: 10
1. LITTLE INTEREST OR PLEASURE IN DOING THINGS: NOT AT ALL
5. POOR APPETITE OR OVEREATING: MORE THAN HALF THE DAYS
4. FEELING TIRED OR HAVING LITTLE ENERGY: MORE THAN HALF THE DAYS
SUM OF ALL RESPONSES TO PHQ QUESTIONS 1-9: 10
3. TROUBLE FALLING OR STAYING ASLEEP: MORE THAN HALF THE DAYS
6. FEELING BAD ABOUT YOURSELF - OR THAT YOU ARE A FAILURE OR HAVE LET YOURSELF OR YOUR FAMILY DOWN: NOT AT ALL

## 2024-08-09 ASSESSMENT — ANXIETY QUESTIONNAIRES
3. WORRYING TOO MUCH ABOUT DIFFERENT THINGS: NEARLY EVERY DAY
4. TROUBLE RELAXING: MORE THAN HALF THE DAYS
2. NOT BEING ABLE TO STOP OR CONTROL WORRYING: NEARLY EVERY DAY
1. FEELING NERVOUS, ANXIOUS, OR ON EDGE: NEARLY EVERY DAY
IF YOU CHECKED OFF ANY PROBLEMS ON THIS QUESTIONNAIRE, HOW DIFFICULT HAVE THESE PROBLEMS MADE IT FOR YOU TO DO YOUR WORK, TAKE CARE OF THINGS AT HOME, OR GET ALONG WITH OTHER PEOPLE: VERY DIFFICULT
GAD7 TOTAL SCORE: 16
6. BECOMING EASILY ANNOYED OR IRRITABLE: MORE THAN HALF THE DAYS
5. BEING SO RESTLESS THAT IT IS HARD TO SIT STILL: SEVERAL DAYS
7. FEELING AFRAID AS IF SOMETHING AWFUL MIGHT HAPPEN: MORE THAN HALF THE DAYS

## 2024-08-09 NOTE — PROGRESS NOTES
Jud Patel is a 54 y.o. female and presents with   Chief Complaint   Patient presents with    Annual Exam      .  Subjective:    Pt is living w 2/4 grandbabies.  Pt started tepezza, rx'ed by her ophthalmologist. She has been having muscle spasms, presumably from the infusions.    PMH-HTN-on indapamide 5mg AND amlodipine     BP Readings from Last 3 Encounters:   08/09/24 (!) 194/110   02/27/24 (!) 130/57   02/09/24 138/84       Prediabetes  Hemoglobin A1C   Date Value Ref Range Status   02/27/2024 5.8 (H) 4.8 - 5.6 % Final     Comment:                 Prediabetes: 5.7 - 6.4           Diabetes: >6.4           Glycemic control for adults with diabetes: <7.0          Hypothyroidism-pt is currently on 100mcg levothyroxine AND tepezza for thyroid eye dz (ophtho)    -pt is followed by endocrinology Dr. Ashton     Lab Results   Component Value Date    TSH <0.005 (L) 02/27/2024    TSH <0.005 (L) 02/27/2024    T4FREE 2.95 (H) 02/27/2024    T4FREE 2.72 (H) 02/27/2024         ANNABELLA-pt uses CPAP     Vit d def-  Lab Results   Component Value Date/Time    VITD25 13.6 02/27/2024 12:00 AM         H/o cigarette smoker- quit 2019      Anxiety- improved on buspar and sertraline     Morbid obesity-pt agrees to meet with bariatrics  Wt Readings from Last 3 Encounters:   08/09/24 120 kg (264 lb 8 oz)   02/27/24 117.4 kg (258 lb 12.8 oz)   02/09/24 115.7 kg (255 lb)        Chronic lbp w radiculopathy-pt reports that gabapentin 300mg TID relieves her back pain, but not the neuropathy    -pt declines maxing dose of gabapentin due to SE     -pt declines pain management referral      H/o CKD 3-now nml  Lab Results   Component Value Date    CREATININE 0.94 02/27/2024    BUN 10 02/27/2024     02/27/2024    K 4.1 02/27/2024    CL 99 02/27/2024    CO2 25 02/27/2024        Review of Systems  Constitutional: negative for fevers, chills, anorexia and weight loss  Eyes:   negative for visual disturbance and irritation  ENT:   negative

## 2024-08-09 NOTE — PROGRESS NOTES
Chief Complaint   Patient presents with    Annual Exam     \"Have you been to the ER, urgent care clinic since your last visit?  Hospitalized since your last visit?\"    NO    “Have you seen or consulted any other health care providers outside of [facility] since your last visit?”    NO            Click Here for Release of Records Request  HIPPA confirmed by two patient identifiers.

## 2024-08-28 ENCOUNTER — OFFICE VISIT (OUTPATIENT)
Age: 54
End: 2024-08-28
Payer: MEDICAID

## 2024-08-28 VITALS
WEIGHT: 264 LBS | DIASTOLIC BLOOD PRESSURE: 70 MMHG | BODY MASS INDEX: 45.07 KG/M2 | HEIGHT: 64 IN | SYSTOLIC BLOOD PRESSURE: 125 MMHG | HEART RATE: 70 BPM

## 2024-08-28 DIAGNOSIS — E89.0 POSTABLATIVE HYPOTHYROIDISM: Primary | ICD-10-CM

## 2024-08-28 DIAGNOSIS — E05.00 GRAVES DISEASE: ICD-10-CM

## 2024-08-28 PROCEDURE — 3078F DIAST BP <80 MM HG: CPT | Performed by: INTERNAL MEDICINE

## 2024-08-28 PROCEDURE — 99214 OFFICE O/P EST MOD 30 MIN: CPT | Performed by: INTERNAL MEDICINE

## 2024-08-28 PROCEDURE — 3074F SYST BP LT 130 MM HG: CPT | Performed by: INTERNAL MEDICINE

## 2024-08-28 PROCEDURE — G2211 COMPLEX E/M VISIT ADD ON: HCPCS | Performed by: INTERNAL MEDICINE

## 2024-08-28 NOTE — PROGRESS NOTES
Chief Complaint   Patient presents with    Thyroid Problem     Pcp and pharmacy verified     History of Present Illness: Jud Patel is a 54 y.o. female here for follow up of hypothyroidism secondary to FONTAINE. She has a hx of Grave's Hyperthyroidism, which was treated with FONTAINE and she devloped subsequent hypothyroidism    \"I have been getting the infusions for my CADEN twice per month since June 2024. I have noted improvement in the bulging in my eyes. It has caused muscle spasms and I have seen weight gain.     She is taking LEVOTHYROXINE 100mcg every day. She takes her LEVOTHYROXINE first thing in the morning, by itself, on an empty stomach. \"sometimes I can not drink water in the morning so I have to drink it with tea.\"    She is still taking the Sertraline 150mg daily.      She denies issues of CP, SOB, tremors, \"I am still having hot flashes\". She notes she will get diarrhea about 2 times per month for about 2 days. No constipation.   She denies issues of dysphagia or dysphonia.    Pt continues to have panic attacks, \"I can get them as often as 2 times per day.     Pt is post-menopausal since 2005.    Current Outpatient Medications   Medication Sig    Teprotumumab-trbw (TEPEZZA) 500 MG SOLR injection Infuse 10 mg/kg intravenously once 2 times per month    cyclobenzaprine (FLEXERIL) 10 MG tablet Take 1 tablet by mouth 3 times daily as needed for Muscle spasms (Patient taking differently: Take 1 tablet by mouth 3 times daily)    amLODIPine (NORVASC) 5 MG tablet Take 1 tablet by mouth daily    busPIRone (BUSPAR) 7.5 MG tablet Take 1 tablet by mouth 3 times daily    indapamide (LOZOL) 2.5 MG tablet Take 2 tablets by mouth daily    sertraline (ZOLOFT) 100 MG tablet TAKE 1 AND 1/2 TABLETS DAILY BY MOUTH    levothyroxine (SYNTHROID) 100 MCG tablet TAKE 1 TABLET BY MOUTH EVERY DAY    triamcinolone (KENALOG) 0.1 % ointment APPLY A THIN LAYER TO AFFECTED AREA TWICE A DAY    meclizine (ANTIVERT) 25 MG tablet TAKE 1

## 2024-08-29 LAB
T4 FREE SERPL-MCNC: 0.77 NG/DL (ref 0.82–1.77)
T4 SERPL-MCNC: 6.5 UG/DL (ref 4.5–12)
TSH RECEP AB SER-ACNC: 1.89 IU/L (ref 0–1.75)
TSH SERPL DL<=0.005 MIU/L-ACNC: 25.2 UIU/ML (ref 0.45–4.5)
TSI SER-ACNC: 1.75 IU/L (ref 0–0.55)

## 2024-08-30 ENCOUNTER — TELEPHONE (OUTPATIENT)
Age: 54
End: 2024-08-30

## 2024-08-30 DIAGNOSIS — E03.9 ACQUIRED HYPOTHYROIDISM: Primary | ICD-10-CM

## 2024-08-30 RX ORDER — LEVOTHYROXINE SODIUM 125 UG/1
125 TABLET ORAL DAILY
Qty: 90 TABLET | Refills: 1 | Status: SHIPPED | OUTPATIENT
Start: 2024-08-30

## 2024-08-30 NOTE — PROGRESS NOTES
I spoke with pt regarding her labs. She insists she has been taking the LT4 100mcg daily. I will increase her dose to 125mcg daily

## 2024-09-16 ENCOUNTER — OFFICE VISIT (OUTPATIENT)
Facility: CLINIC | Age: 54
End: 2024-09-16
Payer: MEDICAID

## 2024-09-16 VITALS
DIASTOLIC BLOOD PRESSURE: 103 MMHG | HEART RATE: 71 BPM | BODY MASS INDEX: 45.41 KG/M2 | HEIGHT: 64 IN | OXYGEN SATURATION: 98 % | TEMPERATURE: 97.4 F | SYSTOLIC BLOOD PRESSURE: 162 MMHG | WEIGHT: 266 LBS | RESPIRATION RATE: 20 BRPM

## 2024-09-16 DIAGNOSIS — E66.01 MORBID OBESITY (HCC): ICD-10-CM

## 2024-09-16 DIAGNOSIS — I10 UNCONTROLLED HYPERTENSION: Primary | ICD-10-CM

## 2024-09-16 DIAGNOSIS — G47.33 OSA (OBSTRUCTIVE SLEEP APNEA): ICD-10-CM

## 2024-09-16 PROCEDURE — 3080F DIAST BP >= 90 MM HG: CPT | Performed by: INTERNAL MEDICINE

## 2024-09-16 PROCEDURE — 3077F SYST BP >= 140 MM HG: CPT | Performed by: INTERNAL MEDICINE

## 2024-09-16 PROCEDURE — 99214 OFFICE O/P EST MOD 30 MIN: CPT | Performed by: INTERNAL MEDICINE

## 2024-09-16 RX ORDER — AMLODIPINE BESYLATE 10 MG/1
10 TABLET ORAL DAILY
Qty: 90 TABLET | Refills: 1 | Status: SHIPPED | OUTPATIENT
Start: 2024-09-16

## 2024-09-16 ASSESSMENT — PATIENT HEALTH QUESTIONNAIRE - PHQ9
SUM OF ALL RESPONSES TO PHQ9 QUESTIONS 1 & 2: 2
7. TROUBLE CONCENTRATING ON THINGS, SUCH AS READING THE NEWSPAPER OR WATCHING TELEVISION: SEVERAL DAYS
SUM OF ALL RESPONSES TO PHQ QUESTIONS 1-9: 10
3. TROUBLE FALLING OR STAYING ASLEEP: MORE THAN HALF THE DAYS
10. IF YOU CHECKED OFF ANY PROBLEMS, HOW DIFFICULT HAVE THESE PROBLEMS MADE IT FOR YOU TO DO YOUR WORK, TAKE CARE OF THINGS AT HOME, OR GET ALONG WITH OTHER PEOPLE: SOMEWHAT DIFFICULT
SUM OF ALL RESPONSES TO PHQ QUESTIONS 1-9: 10
9. THOUGHTS THAT YOU WOULD BE BETTER OFF DEAD, OR OF HURTING YOURSELF: NOT AT ALL
4. FEELING TIRED OR HAVING LITTLE ENERGY: MORE THAN HALF THE DAYS
SUM OF ALL RESPONSES TO PHQ QUESTIONS 1-9: 10
5. POOR APPETITE OR OVEREATING: MORE THAN HALF THE DAYS
6. FEELING BAD ABOUT YOURSELF - OR THAT YOU ARE A FAILURE OR HAVE LET YOURSELF OR YOUR FAMILY DOWN: SEVERAL DAYS
1. LITTLE INTEREST OR PLEASURE IN DOING THINGS: NOT AT ALL
2. FEELING DOWN, DEPRESSED OR HOPELESS: MORE THAN HALF THE DAYS
SUM OF ALL RESPONSES TO PHQ QUESTIONS 1-9: 10
8. MOVING OR SPEAKING SO SLOWLY THAT OTHER PEOPLE COULD HAVE NOTICED. OR THE OPPOSITE, BEING SO FIGETY OR RESTLESS THAT YOU HAVE BEEN MOVING AROUND A LOT MORE THAN USUAL: NOT AT ALL

## 2024-09-16 ASSESSMENT — ANXIETY QUESTIONNAIRES
IF YOU CHECKED OFF ANY PROBLEMS ON THIS QUESTIONNAIRE, HOW DIFFICULT HAVE THESE PROBLEMS MADE IT FOR YOU TO DO YOUR WORK, TAKE CARE OF THINGS AT HOME, OR GET ALONG WITH OTHER PEOPLE: SOMEWHAT DIFFICULT
4. TROUBLE RELAXING: MORE THAN HALF THE DAYS
2. NOT BEING ABLE TO STOP OR CONTROL WORRYING: MORE THAN HALF THE DAYS
GAD7 TOTAL SCORE: 14
3. WORRYING TOO MUCH ABOUT DIFFERENT THINGS: MORE THAN HALF THE DAYS
5. BEING SO RESTLESS THAT IT IS HARD TO SIT STILL: MORE THAN HALF THE DAYS
7. FEELING AFRAID AS IF SOMETHING AWFUL MIGHT HAPPEN: MORE THAN HALF THE DAYS
6. BECOMING EASILY ANNOYED OR IRRITABLE: MORE THAN HALF THE DAYS
1. FEELING NERVOUS, ANXIOUS, OR ON EDGE: MORE THAN HALF THE DAYS

## 2025-01-15 ENCOUNTER — OFFICE VISIT (OUTPATIENT)
Facility: CLINIC | Age: 55
End: 2025-01-15
Payer: MEDICAID

## 2025-01-15 VITALS
TEMPERATURE: 97.6 F | RESPIRATION RATE: 18 BRPM | HEIGHT: 64 IN | HEART RATE: 68 BPM | DIASTOLIC BLOOD PRESSURE: 95 MMHG | BODY MASS INDEX: 44.76 KG/M2 | WEIGHT: 262.2 LBS | OXYGEN SATURATION: 98 % | SYSTOLIC BLOOD PRESSURE: 172 MMHG

## 2025-01-15 DIAGNOSIS — E89.0 HYPOTHYROIDISM FOLLOWING RADIOIODINE THERAPY: ICD-10-CM

## 2025-01-15 DIAGNOSIS — R25.2 SPASM: ICD-10-CM

## 2025-01-15 DIAGNOSIS — R35.0 URINARY FREQUENCY: ICD-10-CM

## 2025-01-15 DIAGNOSIS — N30.00 ACUTE CYSTITIS WITHOUT HEMATURIA: ICD-10-CM

## 2025-01-15 DIAGNOSIS — R73.03 PREDIABETES: ICD-10-CM

## 2025-01-15 DIAGNOSIS — E66.01 MORBID OBESITY: ICD-10-CM

## 2025-01-15 DIAGNOSIS — I10 PRIMARY HYPERTENSION: Primary | ICD-10-CM

## 2025-01-15 DIAGNOSIS — G47.33 OSA (OBSTRUCTIVE SLEEP APNEA): ICD-10-CM

## 2025-01-15 LAB
GLUCOSE, POC: 107 MG/DL
HBA1C MFR BLD: 6.4 %

## 2025-01-15 PROCEDURE — 99214 OFFICE O/P EST MOD 30 MIN: CPT | Performed by: INTERNAL MEDICINE

## 2025-01-15 PROCEDURE — 3080F DIAST BP >= 90 MM HG: CPT | Performed by: INTERNAL MEDICINE

## 2025-01-15 PROCEDURE — 83036 HEMOGLOBIN GLYCOSYLATED A1C: CPT | Performed by: INTERNAL MEDICINE

## 2025-01-15 PROCEDURE — 3077F SYST BP >= 140 MM HG: CPT | Performed by: INTERNAL MEDICINE

## 2025-01-15 PROCEDURE — 82962 GLUCOSE BLOOD TEST: CPT | Performed by: INTERNAL MEDICINE

## 2025-01-15 PROCEDURE — 81003 URINALYSIS AUTO W/O SCOPE: CPT | Performed by: INTERNAL MEDICINE

## 2025-01-15 RX ORDER — SULFAMETHOXAZOLE AND TRIMETHOPRIM 800; 160 MG/1; MG/1
1 TABLET ORAL 2 TIMES DAILY
Qty: 14 TABLET | Refills: 0 | Status: SHIPPED | OUTPATIENT
Start: 2025-01-15 | End: 2025-01-22

## 2025-01-15 RX ORDER — OLMESARTAN MEDOXOMIL AND HYDROCHLOROTHIAZIDE 20/12.5 20; 12.5 MG/1; MG/1
1 TABLET ORAL DAILY
Qty: 90 TABLET | Refills: 1 | Status: SHIPPED | OUTPATIENT
Start: 2025-01-15

## 2025-01-15 ASSESSMENT — ANXIETY QUESTIONNAIRES
2. NOT BEING ABLE TO STOP OR CONTROL WORRYING: SEVERAL DAYS
6. BECOMING EASILY ANNOYED OR IRRITABLE: SEVERAL DAYS
GAD7 TOTAL SCORE: 7
5. BEING SO RESTLESS THAT IT IS HARD TO SIT STILL: SEVERAL DAYS
4. TROUBLE RELAXING: SEVERAL DAYS
1. FEELING NERVOUS, ANXIOUS, OR ON EDGE: SEVERAL DAYS
IF YOU CHECKED OFF ANY PROBLEMS ON THIS QUESTIONNAIRE, HOW DIFFICULT HAVE THESE PROBLEMS MADE IT FOR YOU TO DO YOUR WORK, TAKE CARE OF THINGS AT HOME, OR GET ALONG WITH OTHER PEOPLE: NOT DIFFICULT AT ALL
3. WORRYING TOO MUCH ABOUT DIFFERENT THINGS: SEVERAL DAYS
7. FEELING AFRAID AS IF SOMETHING AWFUL MIGHT HAPPEN: SEVERAL DAYS

## 2025-01-15 ASSESSMENT — COLUMBIA-SUICIDE SEVERITY RATING SCALE - C-SSRS
6. HAVE YOU EVER DONE ANYTHING, STARTED TO DO ANYTHING, OR PREPARED TO DO ANYTHING TO END YOUR LIFE?: NO
1. WITHIN THE PAST MONTH, HAVE YOU WISHED YOU WERE DEAD OR WISHED YOU COULD GO TO SLEEP AND NOT WAKE UP?: NO
2. HAVE YOU ACTUALLY HAD ANY THOUGHTS OF KILLING YOURSELF?: NO

## 2025-01-15 ASSESSMENT — PATIENT HEALTH QUESTIONNAIRE - PHQ9
SUM OF ALL RESPONSES TO PHQ QUESTIONS 1-9: 5
2. FEELING DOWN, DEPRESSED OR HOPELESS: SEVERAL DAYS
SUM OF ALL RESPONSES TO PHQ QUESTIONS 1-9: 5
7. TROUBLE CONCENTRATING ON THINGS, SUCH AS READING THE NEWSPAPER OR WATCHING TELEVISION: NOT AT ALL
9. THOUGHTS THAT YOU WOULD BE BETTER OFF DEAD, OR OF HURTING YOURSELF: NOT AT ALL
10. IF YOU CHECKED OFF ANY PROBLEMS, HOW DIFFICULT HAVE THESE PROBLEMS MADE IT FOR YOU TO DO YOUR WORK, TAKE CARE OF THINGS AT HOME, OR GET ALONG WITH OTHER PEOPLE: SOMEWHAT DIFFICULT
SUM OF ALL RESPONSES TO PHQ QUESTIONS 1-9: 5
8. MOVING OR SPEAKING SO SLOWLY THAT OTHER PEOPLE COULD HAVE NOTICED. OR THE OPPOSITE, BEING SO FIGETY OR RESTLESS THAT YOU HAVE BEEN MOVING AROUND A LOT MORE THAN USUAL: NOT AT ALL
4. FEELING TIRED OR HAVING LITTLE ENERGY: SEVERAL DAYS
5. POOR APPETITE OR OVEREATING: SEVERAL DAYS
SUM OF ALL RESPONSES TO PHQ9 QUESTIONS 1 & 2: 1
SUM OF ALL RESPONSES TO PHQ QUESTIONS 1-9: 5
3. TROUBLE FALLING OR STAYING ASLEEP: SEVERAL DAYS
6. FEELING BAD ABOUT YOURSELF - OR THAT YOU ARE A FAILURE OR HAVE LET YOURSELF OR YOUR FAMILY DOWN: SEVERAL DAYS
1. LITTLE INTEREST OR PLEASURE IN DOING THINGS: NOT AT ALL

## 2025-01-15 NOTE — PROGRESS NOTES
Chief Complaint   Patient presents with    Hypertension    Urinary Urgency     Patient states she feels like she have to urinate but when she try she won't go or a little come out. Patient states when she first urinate in the morning her urine have an odor, it smells like a chemical smell.    Polydipsia     Patient states she is extremely thirsty.     \"Have you been to the ER, urgent care clinic since your last visit?  Hospitalized since your last visit?\"    NO    “Have you seen or consulted any other health care providers outside our system since your last visit?”    NO    Have you had a mammogram?”   NO    Date of last Mammogram: 9/27/2022      “Have you had a pap smear?”    NO    Date of last Cervical Cancer screen (HPV or PAP): 11/14/2019            HIPPA confirmed by two patient identifiers.

## 2025-01-15 NOTE — PROGRESS NOTES
Jud Patel is a 54 y.o. female and presents with   Chief Complaint   Patient presents with    Hypertension    Urinary Urgency     Patient states she feels like she have to urinate but when she try she won't go or a little come out. Patient states when she first urinate in the morning her urine have an odor, it smells like a chemical smell.    Polydipsia     Patient states she is extremely thirsty.      .  Subjective:    Pt is living w 2/4 grandbabies.    Pt w above concerns AND persist total body spasm, despite completing tepezza. Pt THOUGHT the spasms were due to a SE.    STAT urinalysis + nitrite    PMH-HTN-on indapamide 2.5mg AND amlodipine 10mg     BP Readings from Last 3 Encounters:   01/15/25 (!) 172/95   09/16/24 (!) 162/103   08/28/24 125/70       Prediabetes  Hemoglobin A1C   Date Value Ref Range Status   02/27/2024 5.8 (H) 4.8 - 5.6 % Final     Comment:                 Prediabetes: 5.7 - 6.4           Diabetes: >6.4           Glycemic control for adults with diabetes: <7.0          Hypothyroidism-pt is currently on 100mcg levothyroxine AND completed tepezza for thyroid eye dz (ophtho)    -pt is followed by endocrinology Dr. Ashton     Lab Results   Component Value Date    TSH 25.200 (H) 08/28/2024    T4FREE 0.77 (L) 08/28/2024         ANNABELLA-pt uses CPAP     Vit d def-  Lab Results   Component Value Date/Time    VITD25 13.6 02/27/2024 12:00 AM         H/o cigarette smoker- quit 2019      Anxiety- improved on buspar and sertraline     Morbid obesity-pt agrees to meet with bariatrics  Wt Readings from Last 3 Encounters:   01/15/25 118.9 kg (262 lb 3.2 oz)   09/16/24 120.7 kg (266 lb)   08/28/24 119.7 kg (264 lb)        Chronic lbp w radiculopathy-pt reports that gabapentin 300mg TID relieves her back pain, but not the neuropathy    -pt declines maxing dose of gabapentin due to SE     -pt declines pain management referral      H/o CKD 3-now nml  Lab Results   Component Value Date    CREATININE 0.94

## 2025-01-16 DIAGNOSIS — R35.0 URINARY FREQUENCY: ICD-10-CM

## 2025-01-16 LAB
COMMENT:: NORMAL
SPECIMEN HOLD: NORMAL

## 2025-01-17 LAB
BACTERIA SPEC CULT: NORMAL
CC UR VC: NORMAL
SERVICE CMNT-IMP: NORMAL

## 2025-01-19 LAB
ALBUMIN SERPL-MCNC: 4.6 G/DL (ref 3.5–5)
ALBUMIN/GLOB SERPL: 1.4 (ref 1.1–2.2)
ALP SERPL-CCNC: 71 U/L (ref 45–117)
ALT SERPL-CCNC: 38 U/L (ref 12–78)
ANION GAP SERPL CALC-SCNC: 8 MMOL/L (ref 2–12)
AST SERPL-CCNC: 34 U/L (ref 15–37)
BILIRUB SERPL-MCNC: 0.6 MG/DL (ref 0.2–1)
BUN SERPL-MCNC: 11 MG/DL (ref 6–20)
BUN/CREAT SERPL: 7 (ref 12–20)
CALCIUM SERPL-MCNC: 10.4 MG/DL (ref 8.5–10.1)
CALCIUM SERPL-MCNC: 10.4 MG/DL (ref 8.5–10.1)
CHLORIDE SERPL-SCNC: 98 MMOL/L (ref 97–108)
CO2 SERPL-SCNC: 30 MMOL/L (ref 21–32)
CREAT SERPL-MCNC: 1.56 MG/DL (ref 0.55–1.02)
GLOBULIN SER CALC-MCNC: 3.4 G/DL (ref 2–4)
GLUCOSE SERPL-MCNC: 106 MG/DL (ref 65–100)
MAGNESIUM SERPL-MCNC: 2.2 MG/DL (ref 1.6–2.4)
POTASSIUM SERPL-SCNC: 4 MMOL/L (ref 3.5–5.1)
PROT SERPL-MCNC: 8 G/DL (ref 6.4–8.2)
PTH-INTACT SERPL-MCNC: 175.1 PG/ML (ref 18.4–88)
SODIUM SERPL-SCNC: 136 MMOL/L (ref 136–145)

## 2025-02-05 RX ORDER — BUSPIRONE HYDROCHLORIDE 7.5 MG/1
7.5 TABLET ORAL 3 TIMES DAILY
Qty: 270 TABLET | Refills: 0 | Status: SHIPPED | OUTPATIENT
Start: 2025-02-05

## 2025-02-05 NOTE — TELEPHONE ENCOUNTER
Last appointment: 01/15/2025 MD Valerio   Next appointment: 02/12/2025 MD Valerio   Previous refill encounter(s):   08/09/2024 Buspar #270 with 1 refill.     For Pharmacy Admin Tracking Only    Program: Medication Refill  Intervention Detail: New Rx: 1, reason: Patient Preference  Time Spent (min): 5    Requested Prescriptions     Pending Prescriptions Disp Refills    busPIRone (BUSPAR) 7.5 MG tablet [Pharmacy Med Name: BUSPIRONE HCL 7.5 MG TABLET] 270 tablet 0     Sig: TAKE 1 TABLET BY MOUTH THREE TIMES A DAY

## 2025-02-25 SDOH — ECONOMIC STABILITY: FOOD INSECURITY: WITHIN THE PAST 12 MONTHS, YOU WORRIED THAT YOUR FOOD WOULD RUN OUT BEFORE YOU GOT MONEY TO BUY MORE.: NEVER TRUE

## 2025-02-25 SDOH — ECONOMIC STABILITY: FOOD INSECURITY: WITHIN THE PAST 12 MONTHS, THE FOOD YOU BOUGHT JUST DIDN'T LAST AND YOU DIDN'T HAVE MONEY TO GET MORE.: NEVER TRUE

## 2025-02-25 SDOH — ECONOMIC STABILITY: INCOME INSECURITY: IN THE LAST 12 MONTHS, WAS THERE A TIME WHEN YOU WERE NOT ABLE TO PAY THE MORTGAGE OR RENT ON TIME?: NO

## 2025-02-25 SDOH — ECONOMIC STABILITY: TRANSPORTATION INSECURITY
IN THE PAST 12 MONTHS, HAS THE LACK OF TRANSPORTATION KEPT YOU FROM MEDICAL APPOINTMENTS OR FROM GETTING MEDICATIONS?: NO

## 2025-02-26 ENCOUNTER — OFFICE VISIT (OUTPATIENT)
Facility: CLINIC | Age: 55
End: 2025-02-26
Payer: MEDICAID

## 2025-02-26 VITALS
OXYGEN SATURATION: 98 % | BODY MASS INDEX: 45.31 KG/M2 | DIASTOLIC BLOOD PRESSURE: 90 MMHG | SYSTOLIC BLOOD PRESSURE: 160 MMHG | HEART RATE: 79 BPM | HEIGHT: 64 IN | RESPIRATION RATE: 19 BRPM | TEMPERATURE: 97.5 F | WEIGHT: 265.4 LBS

## 2025-02-26 DIAGNOSIS — I10 PRIMARY HYPERTENSION: ICD-10-CM

## 2025-02-26 PROCEDURE — 3080F DIAST BP >= 90 MM HG: CPT | Performed by: INTERNAL MEDICINE

## 2025-02-26 PROCEDURE — 3077F SYST BP >= 140 MM HG: CPT | Performed by: INTERNAL MEDICINE

## 2025-02-26 PROCEDURE — 99213 OFFICE O/P EST LOW 20 MIN: CPT | Performed by: INTERNAL MEDICINE

## 2025-02-26 RX ORDER — OLMESARTAN MEDOXOMIL AND HYDROCHLOROTHIAZIDE 40/25 40; 25 MG/1; MG/1
1 TABLET ORAL DAILY
Qty: 90 TABLET | Refills: 1 | Status: SHIPPED | OUTPATIENT
Start: 2025-02-26

## 2025-02-26 ASSESSMENT — ANXIETY QUESTIONNAIRES
5. BEING SO RESTLESS THAT IT IS HARD TO SIT STILL: MORE THAN HALF THE DAYS
6. BECOMING EASILY ANNOYED OR IRRITABLE: MORE THAN HALF THE DAYS
4. TROUBLE RELAXING: MORE THAN HALF THE DAYS
3. WORRYING TOO MUCH ABOUT DIFFERENT THINGS: MORE THAN HALF THE DAYS
2. NOT BEING ABLE TO STOP OR CONTROL WORRYING: MORE THAN HALF THE DAYS
7. FEELING AFRAID AS IF SOMETHING AWFUL MIGHT HAPPEN: MORE THAN HALF THE DAYS
1. FEELING NERVOUS, ANXIOUS, OR ON EDGE: MORE THAN HALF THE DAYS
IF YOU CHECKED OFF ANY PROBLEMS ON THIS QUESTIONNAIRE, HOW DIFFICULT HAVE THESE PROBLEMS MADE IT FOR YOU TO DO YOUR WORK, TAKE CARE OF THINGS AT HOME, OR GET ALONG WITH OTHER PEOPLE: SOMEWHAT DIFFICULT
GAD7 TOTAL SCORE: 14

## 2025-02-26 ASSESSMENT — PATIENT HEALTH QUESTIONNAIRE - PHQ9
4. FEELING TIRED OR HAVING LITTLE ENERGY: MORE THAN HALF THE DAYS
SUM OF ALL RESPONSES TO PHQ QUESTIONS 1-9: 12
5. POOR APPETITE OR OVEREATING: MORE THAN HALF THE DAYS
6. FEELING BAD ABOUT YOURSELF - OR THAT YOU ARE A FAILURE OR HAVE LET YOURSELF OR YOUR FAMILY DOWN: SEVERAL DAYS
SUM OF ALL RESPONSES TO PHQ QUESTIONS 1-9: 12
3. TROUBLE FALLING OR STAYING ASLEEP: MORE THAN HALF THE DAYS
SUM OF ALL RESPONSES TO PHQ QUESTIONS 1-9: 12
1. LITTLE INTEREST OR PLEASURE IN DOING THINGS: MORE THAN HALF THE DAYS
2. FEELING DOWN, DEPRESSED OR HOPELESS: MORE THAN HALF THE DAYS
SUM OF ALL RESPONSES TO PHQ9 QUESTIONS 1 & 2: 4
9. THOUGHTS THAT YOU WOULD BE BETTER OFF DEAD, OR OF HURTING YOURSELF: NOT AT ALL
7. TROUBLE CONCENTRATING ON THINGS, SUCH AS READING THE NEWSPAPER OR WATCHING TELEVISION: SEVERAL DAYS
8. MOVING OR SPEAKING SO SLOWLY THAT OTHER PEOPLE COULD HAVE NOTICED. OR THE OPPOSITE, BEING SO FIGETY OR RESTLESS THAT YOU HAVE BEEN MOVING AROUND A LOT MORE THAN USUAL: NOT AT ALL
SUM OF ALL RESPONSES TO PHQ QUESTIONS 1-9: 12
10. IF YOU CHECKED OFF ANY PROBLEMS, HOW DIFFICULT HAVE THESE PROBLEMS MADE IT FOR YOU TO DO YOUR WORK, TAKE CARE OF THINGS AT HOME, OR GET ALONG WITH OTHER PEOPLE: SOMEWHAT DIFFICULT

## 2025-02-26 NOTE — PROGRESS NOTES
Chief Complaint   Patient presents with    Hypertension     \"Have you been to the ER, urgent care clinic since your last visit?  Hospitalized since your last visit?\"    NO    “Have you seen or consulted any other health care providers outside our system since your last visit?”    NO    Have you had a mammogram?”   NO    Date of last Mammogram: 9/27/2022      “Have you had a pap smear?”    NO    Date of last Cervical Cancer screen (HPV or PAP): 11/14/2019            HIPPA confirmed by two patient identifiers.'  
 negative for chest pain, palpitations, lower extremity edema  GI:   negative for nausea, vomiting, diarrhea, abdominal pain,melena  Neurological:  negative for headaches, dizziness, vertigo, memory problems and gait     Past Medical History:   Diagnosis Date    Chronic kidney disease     kidney failure-left    Depression     GERD (gastroesophageal reflux disease)     Grave's disease     Hypertension     Hypothyroidism following radioiodine therapy     2009    Liver disease     Morbid obesity (HCC)     Nausea & vomiting     Obstructive sleep apnea (adult) (pediatric) 1/8/2013    Thyroid disease      Past Surgical History:   Procedure Laterality Date    COLONOSCOPY N/A 7/12/2019    COLONOSCOPY performed by Elliot Vásquez MD at [facility] ENDOSCOPY    HX CHOLECYSTECTOMY      HX GYN  2005    novasure    HX ORTHOPAEDIC      Left arm    HX UROLOGICAL      bladder sling     Social History     Socioeconomic History    Marital status: SINGLE   Tobacco Use    Smoking status: Light Smoker     Packs/day: 0.25     Types: Cigarettes    Smokeless tobacco: Never    Tobacco comments:     2 cigs once a month   Vaping Use    Vaping Use: Never used   Substance and Sexual Activity    Alcohol use: Not Currently     Comment: rare, 1 drink a month    Drug use: No    Sexual activity: Not Currently     Social Determinants of Health     Financial Resource Strain: Low Risk     Difficulty of Paying Living Expenses: Not hard at all   Food Insecurity: No Food Insecurity    Worried About Running Out of Food in the Last Year: Never true    Ran Out of Food in the Last Year: Never true     Family History   Problem Relation Age of Onset    Hypertension Mother     Emphysema Mother     Hypertension Father     Other Father     Heart Attack Father     Stroke Father     Osteoporosis Father     Diabetes Sister     Hypertension Brother        Current Outpatient Medications:     olmesartan-hydroCHLOROthiazide (BENICAR HCT) 40-25 MG per tablet, Take 1 tablet by

## 2025-02-27 DIAGNOSIS — F32.9 MAJOR DEPRESSIVE DISORDER WITH SINGLE EPISODE, REMISSION STATUS UNSPECIFIED: ICD-10-CM

## 2025-02-27 RX ORDER — SERTRALINE HYDROCHLORIDE 100 MG/1
TABLET, FILM COATED ORAL
Qty: 135 TABLET | Refills: 1 | Status: SHIPPED | OUTPATIENT
Start: 2025-02-27

## 2025-02-27 NOTE — TELEPHONE ENCOUNTER
Last appointment: 02/26/2025 MD Valerio   Next appointment: 04/02/2025 MD Valerio   Previous refill encounter(s):   08/09/2024 Zoloft #135 with 1 refill.     For Pharmacy Admin Tracking Only    Program: Medication Refill  Intervention Detail: New Rx: 1, reason: Patient Preference  Time Spent (min): 5    Requested Prescriptions     Pending Prescriptions Disp Refills    sertraline (ZOLOFT) 100 MG tablet 135 tablet 0     Sig: TAKE 1 AND 1/2 TABLETS DAILY BY MOUTH

## 2025-02-28 ENCOUNTER — OFFICE VISIT (OUTPATIENT)
Age: 55
End: 2025-02-28
Payer: MEDICAID

## 2025-02-28 VITALS
HEART RATE: 64 BPM | DIASTOLIC BLOOD PRESSURE: 60 MMHG | HEIGHT: 64 IN | WEIGHT: 269 LBS | BODY MASS INDEX: 45.93 KG/M2 | SYSTOLIC BLOOD PRESSURE: 110 MMHG

## 2025-02-28 DIAGNOSIS — E83.52 HYPERCALCEMIA: ICD-10-CM

## 2025-02-28 DIAGNOSIS — E05.00 GRAVES DISEASE: ICD-10-CM

## 2025-02-28 DIAGNOSIS — E89.0 POSTABLATIVE HYPOTHYROIDISM: Primary | ICD-10-CM

## 2025-02-28 DIAGNOSIS — E21.3 HYPERPARATHYROIDISM: ICD-10-CM

## 2025-02-28 PROCEDURE — 99214 OFFICE O/P EST MOD 30 MIN: CPT | Performed by: INTERNAL MEDICINE

## 2025-02-28 NOTE — PROGRESS NOTES
Chief Complaint   Patient presents with    Thyroid Problem     Pcp and pharmacy verified     History of Present Illness: Jud Patel is a 54 y.o. female here for follow up of hypothyroidism secondary to FONTAINE. She has a hx of Grave's Hyperthyroidism, which was treated with FONTAINE and she devloped subsequent hypothyroidism    \"I am no longer getting the infusions for my CADEN. They stopped it November 2024, I am not sure when they are going to restart them.\"    Pt denies any recent illnesses, injuries or hospitalizations.    She is taking LEVOTHYROXINE 125mcg every day. She takes her LEVOTHYROXINE first thing in the morning, by itself, on an empty stomach. \"sometimes I can not drink water in the morning so I have to drink it with tea.\"    She is still taking the Sertraline 150mg daily.      She denies issues of CP, SOB, tremors, \"I am still having hot flashes\". She notes she will get diarrhea about 2 times per month for about 2 days. No constipation.   She denies issues of dysphagia or dysphonia.    Pt continues to have panic attacks, \"I can get them as often as 2 times per day.     She denies issues of hematuria, dysuria, or kidney stones.  She denies any falls, or fractures since our last visit.    Pt is post-menopausal since 2005. She was never on HRT.    Current Outpatient Medications   Medication Sig    sertraline (ZOLOFT) 100 MG tablet TAKE 1 AND 1/2 TABLETS DAILY BY MOUTH    olmesartan-hydroCHLOROthiazide (BENICAR HCT) 40-25 MG per tablet Take 1 tablet by mouth daily    busPIRone (BUSPAR) 7.5 MG tablet TAKE 1 TABLET BY MOUTH THREE TIMES A DAY    amLODIPine (NORVASC) 10 MG tablet Take 1 tablet by mouth daily    levothyroxine (SYNTHROID) 125 MCG tablet Take 1 tablet by mouth daily    cyclobenzaprine (FLEXERIL) 10 MG tablet Take 1 tablet by mouth 3 times daily as needed for Muscle spasms (Patient taking differently: Take 1 tablet by mouth 3 times daily)    triamcinolone (KENALOG) 0.1 % ointment APPLY A THIN

## 2025-02-28 NOTE — PATIENT INSTRUCTIONS
Pick a day when you can be home all day to do the urine collection.   On the day you start the collection you will DISCARD your first urine of the day, but collect ALL the rest of your urine for that day, including anytime you go to the bathroom that night.  On the second day you DO collect your first urine of day two, [facility]

## 2025-03-19 LAB
1,25(OH)2D SERPL-MCNC: 54.6 PG/ML (ref 24.8–81.5)
25(OH)D3+25(OH)D2 SERPL-MCNC: 6.8 NG/ML (ref 30–100)
ALBUMIN SERPL-MCNC: 4.7 G/DL (ref 3.8–4.9)
BUN SERPL-MCNC: 12 MG/DL (ref 6–24)
BUN/CREAT SERPL: 8 (ref 9–23)
CALCIUM SERPL-MCNC: 10.2 MG/DL (ref 8.7–10.2)
CHLORIDE SERPL-SCNC: 98 MMOL/L (ref 96–106)
CO2 SERPL-SCNC: 24 MMOL/L (ref 20–29)
CREAT SERPL-MCNC: 1.46 MG/DL (ref 0.57–1)
EGFRCR SERPLBLD CKD-EPI 2021: 42 ML/MIN/1.73
GLUCOSE SERPL-MCNC: 150 MG/DL (ref 70–99)
PHOSPHATE SERPL-MCNC: 3 MG/DL (ref 3–4.3)
POTASSIUM SERPL-SCNC: 4.3 MMOL/L (ref 3.5–5.2)
PTH-INTACT SERPL-MCNC: 96 PG/ML (ref 15–65)
SODIUM SERPL-SCNC: 139 MMOL/L (ref 134–144)
T4 FREE SERPL-MCNC: 1.02 NG/DL (ref 0.82–1.77)
T4 SERPL-MCNC: 7.6 UG/DL (ref 4.5–12)
TSH RECEP AB SER-ACNC: 1.15 IU/L (ref 0–1.75)
TSH SERPL DL<=0.005 MIU/L-ACNC: 32.2 UIU/ML (ref 0.45–4.5)
TSI SER-ACNC: 0.74 IU/L (ref 0–0.55)

## 2025-03-20 DIAGNOSIS — E21.3 HYPERPARATHYROIDISM: ICD-10-CM

## 2025-03-20 DIAGNOSIS — E89.0 POSTABLATIVE HYPOTHYROIDISM: Primary | ICD-10-CM

## 2025-03-20 DIAGNOSIS — E83.52 HYPERCALCEMIA: ICD-10-CM

## 2025-03-20 LAB
CALCIUM 24H UR-MCNC: 3.6 MG/DL
CALCIUM 24H UR-MRATE: 54 MG/24 HR (ref 0–320)
CREAT 24H UR-MRATE: 1344 MG/24 HR (ref 800–1800)
CREAT UR-MCNC: 89.6 MG/DL
REPORT: NORMAL

## 2025-03-20 RX ORDER — ERGOCALCIFEROL 1.25 MG/1
50000 CAPSULE, LIQUID FILLED ORAL WEEKLY
Qty: 12 CAPSULE | Refills: 3 | Status: SHIPPED | OUTPATIENT
Start: 2025-03-20

## 2025-03-24 ENCOUNTER — TELEPHONE (OUTPATIENT)
Age: 55
End: 2025-03-24

## 2025-03-25 ENCOUNTER — TELEPHONE (OUTPATIENT)
Age: 55
End: 2025-03-25

## 2025-04-02 ENCOUNTER — OFFICE VISIT (OUTPATIENT)
Facility: CLINIC | Age: 55
End: 2025-04-02
Payer: MEDICAID

## 2025-04-02 VITALS
HEIGHT: 64 IN | SYSTOLIC BLOOD PRESSURE: 134 MMHG | TEMPERATURE: 97.4 F | WEIGHT: 264.3 LBS | HEART RATE: 74 BPM | OXYGEN SATURATION: 98 % | BODY MASS INDEX: 45.12 KG/M2 | RESPIRATION RATE: 18 BRPM | DIASTOLIC BLOOD PRESSURE: 73 MMHG

## 2025-04-02 DIAGNOSIS — I10 PRIMARY HYPERTENSION: Primary | ICD-10-CM

## 2025-04-02 PROCEDURE — 3075F SYST BP GE 130 - 139MM HG: CPT | Performed by: INTERNAL MEDICINE

## 2025-04-02 PROCEDURE — 3078F DIAST BP <80 MM HG: CPT | Performed by: INTERNAL MEDICINE

## 2025-04-02 PROCEDURE — 99213 OFFICE O/P EST LOW 20 MIN: CPT | Performed by: INTERNAL MEDICINE

## 2025-04-02 ASSESSMENT — PATIENT HEALTH QUESTIONNAIRE - PHQ9
4. FEELING TIRED OR HAVING LITTLE ENERGY: MORE THAN HALF THE DAYS
7. TROUBLE CONCENTRATING ON THINGS, SUCH AS READING THE NEWSPAPER OR WATCHING TELEVISION: MORE THAN HALF THE DAYS
SUM OF ALL RESPONSES TO PHQ QUESTIONS 1-9: 14
10. IF YOU CHECKED OFF ANY PROBLEMS, HOW DIFFICULT HAVE THESE PROBLEMS MADE IT FOR YOU TO DO YOUR WORK, TAKE CARE OF THINGS AT HOME, OR GET ALONG WITH OTHER PEOPLE: SOMEWHAT DIFFICULT
8. MOVING OR SPEAKING SO SLOWLY THAT OTHER PEOPLE COULD HAVE NOTICED. OR THE OPPOSITE, BEING SO FIGETY OR RESTLESS THAT YOU HAVE BEEN MOVING AROUND A LOT MORE THAN USUAL: NOT AT ALL
3. TROUBLE FALLING OR STAYING ASLEEP: MORE THAN HALF THE DAYS
2. FEELING DOWN, DEPRESSED OR HOPELESS: MORE THAN HALF THE DAYS
5. POOR APPETITE OR OVEREATING: MORE THAN HALF THE DAYS
9. THOUGHTS THAT YOU WOULD BE BETTER OFF DEAD, OR OF HURTING YOURSELF: NOT AT ALL
SUM OF ALL RESPONSES TO PHQ QUESTIONS 1-9: 14
1. LITTLE INTEREST OR PLEASURE IN DOING THINGS: MORE THAN HALF THE DAYS
6. FEELING BAD ABOUT YOURSELF - OR THAT YOU ARE A FAILURE OR HAVE LET YOURSELF OR YOUR FAMILY DOWN: MORE THAN HALF THE DAYS

## 2025-04-02 ASSESSMENT — ANXIETY QUESTIONNAIRES
GAD7 TOTAL SCORE: 14
2. NOT BEING ABLE TO STOP OR CONTROL WORRYING: MORE THAN HALF THE DAYS
4. TROUBLE RELAXING: MORE THAN HALF THE DAYS
1. FEELING NERVOUS, ANXIOUS, OR ON EDGE: MORE THAN HALF THE DAYS
3. WORRYING TOO MUCH ABOUT DIFFERENT THINGS: MORE THAN HALF THE DAYS
IF YOU CHECKED OFF ANY PROBLEMS ON THIS QUESTIONNAIRE, HOW DIFFICULT HAVE THESE PROBLEMS MADE IT FOR YOU TO DO YOUR WORK, TAKE CARE OF THINGS AT HOME, OR GET ALONG WITH OTHER PEOPLE: SOMEWHAT DIFFICULT
6. BECOMING EASILY ANNOYED OR IRRITABLE: MORE THAN HALF THE DAYS
7. FEELING AFRAID AS IF SOMETHING AWFUL MIGHT HAPPEN: MORE THAN HALF THE DAYS
5. BEING SO RESTLESS THAT IT IS HARD TO SIT STILL: MORE THAN HALF THE DAYS

## 2025-04-02 NOTE — PROGRESS NOTES
Jud Patel is a 54 y.o. female and presents with   Chief Complaint   Patient presents with    Hypertension      .  Subjective:    Pt is living w 2/4 grandbabies.        PMH-HTN-on  olmesartan/hctz 40/25 andamlodipine 10mg     BP Readings from Last 3 Encounters:   04/02/25 134/73   02/28/25 110/60   02/26/25 (!) 160/90       Prediabetes  Hemoglobin A1C   Date Value Ref Range Status   02/27/2024 5.8 (H) 4.8 - 5.6 % Final     Comment:                 Prediabetes: 5.7 - 6.4           Diabetes: >6.4           Glycemic control for adults with diabetes: <7.0          Hypothyroidism-pt is currently on 125mcg levothyroxine AND completed tepezza for thyroid eye dz (ophtho)    -pt is followed by endocrinology Dr. Ashton     Lab Results   Component Value Date    TSH 32.200 (H) 03/18/2025    T4FREE 1.02 03/18/2025         ANNABELLA-pt uses CPAP     Vit d def-  Lab Results   Component Value Date/Time    VITD25 6.8 03/18/2025 11:47 AM         H/o cigarette smoker- quit 2019      Anxiety- improved on buspar and sertraline     Morbid obesity-pt agrees to meet with bariatrics  Wt Readings from Last 3 Encounters:   04/02/25 119.9 kg (264 lb 4.8 oz)   02/28/25 122 kg (269 lb)   02/26/25 120.4 kg (265 lb 6.4 oz)        Chronic lbp w radiculopathy-pt reports that gabapentin 300mg TID relieves her back pain, but not the neuropathy    -pt declines maxing dose of gabapentin due to SE     -pt declines pain management referral      H/o CKD 3-now nml  Lab Results   Component Value Date    CREATININE 1.46 (H) 03/18/2025    BUN 12 03/18/2025     03/18/2025    K 4.3 03/18/2025    CL 98 03/18/2025    CO2 24 03/18/2025        Review of Systems  Constitutional: negative for fevers, chills, anorexia and weight loss  Eyes:   negative for visual disturbance and irritation  ENT:   negative for tinnitus,sore throat,nasal congestion,ear pains.hoarseness  Respiratory:  negative for cough, hemoptysis, dyspnea,wheezing  CV:   negative for chest

## 2025-04-02 NOTE — PROGRESS NOTES
Chief Complaint   Patient presents with    Hypertension     \"Have you been to the ER, urgent care clinic since your last visit?  Hospitalized since your last visit?\"    NO    “Have you seen or consulted any other health care providers outside our system since your last visit?”    NO    Have you had a mammogram?”   NO    Date of last Mammogram: 9/27/2022      “Have you had a pap smear?”    NO    Date of last Cervical Cancer screen (HPV or PAP): 11/14/2019            HIPPA confirmed by two patient identifiers.

## 2025-05-12 DIAGNOSIS — F41.9 ANXIETY DISORDER, UNSPECIFIED TYPE: Primary | ICD-10-CM

## 2025-05-12 RX ORDER — BUSPIRONE HYDROCHLORIDE 7.5 MG/1
7.5 TABLET ORAL 3 TIMES DAILY
Qty: 90 TABLET | Refills: 0 | Status: SHIPPED | OUTPATIENT
Start: 2025-05-12

## 2025-05-29 ENCOUNTER — OFFICE VISIT (OUTPATIENT)
Facility: CLINIC | Age: 55
End: 2025-05-29
Payer: MEDICAID

## 2025-05-29 VITALS
HEART RATE: 66 BPM | OXYGEN SATURATION: 96 % | HEIGHT: 64 IN | WEIGHT: 270.8 LBS | RESPIRATION RATE: 19 BRPM | BODY MASS INDEX: 46.23 KG/M2 | SYSTOLIC BLOOD PRESSURE: 147 MMHG | TEMPERATURE: 98 F | DIASTOLIC BLOOD PRESSURE: 92 MMHG

## 2025-05-29 DIAGNOSIS — R05.3 CHRONIC COUGH: Primary | ICD-10-CM

## 2025-05-29 DIAGNOSIS — J04.0 LARYNGITIS: ICD-10-CM

## 2025-05-29 PROCEDURE — 3079F DIAST BP 80-89 MM HG: CPT | Performed by: INTERNAL MEDICINE

## 2025-05-29 PROCEDURE — 99213 OFFICE O/P EST LOW 20 MIN: CPT | Performed by: INTERNAL MEDICINE

## 2025-05-29 PROCEDURE — 3077F SYST BP >= 140 MM HG: CPT | Performed by: INTERNAL MEDICINE

## 2025-05-29 RX ORDER — BENZONATATE 200 MG/1
200 CAPSULE ORAL 3 TIMES DAILY PRN
Qty: 30 CAPSULE | Refills: 0 | Status: SHIPPED | OUTPATIENT
Start: 2025-05-29 | End: 2025-06-08

## 2025-05-29 RX ORDER — CYCLOBENZAPRINE HCL 10 MG
10 TABLET ORAL 3 TIMES DAILY PRN
Qty: 21 TABLET | Refills: 0 | Status: SHIPPED | OUTPATIENT
Start: 2025-05-29 | End: 2025-06-08

## 2025-05-29 RX ORDER — AZITHROMYCIN 250 MG/1
TABLET, FILM COATED ORAL
Qty: 6 TABLET | Refills: 0 | Status: SHIPPED | OUTPATIENT
Start: 2025-05-29 | End: 2025-06-08

## 2025-05-29 ASSESSMENT — PATIENT HEALTH QUESTIONNAIRE - PHQ9
SUM OF ALL RESPONSES TO PHQ QUESTIONS 1-9: 0
SUM OF ALL RESPONSES TO PHQ QUESTIONS 1-9: 0
2. FEELING DOWN, DEPRESSED OR HOPELESS: NOT AT ALL
SUM OF ALL RESPONSES TO PHQ QUESTIONS 1-9: 0
1. LITTLE INTEREST OR PLEASURE IN DOING THINGS: NOT AT ALL
SUM OF ALL RESPONSES TO PHQ QUESTIONS 1-9: 0

## 2025-05-29 NOTE — PROGRESS NOTES
Chief Complaint   Patient presents with    Cough    Pharyngitis     Patient states all symptoms started about 3 weeks ago. Patient denies any fever. Patient states she did a home COVID test when symptoms started and it was NEGATIVE.    Headache     Patient states headache started yesterday, she tried Excedrin but it did not work.     Have you been to the ER, urgent care clinic since your last visit?  Hospitalized since your last visit?   NO    Have you seen or consulted any other health care providers outside our system since your last visit?   NO    Have you had a mammogram?”   NO    Date of last Mammogram: 9/27/2022      “Have you had a pap smear?”    NO    Date of last Cervical Cancer screen (HPV or PAP): 11/14/2019            HIPPA confirmed by two patient identifiers.

## 2025-05-29 NOTE — PROGRESS NOTES
Jud Patel is a 54 y.o. female and presents with   Chief Complaint   Patient presents with    Cough    Pharyngitis     Patient states all symptoms started about 3 weeks ago. Patient denies any fever. Patient states she did a home COVID test when symptoms started and it was NEGATIVE.    Headache     Patient states headache started yesterday, she tried Excedrin but it did not work.      .  Subjective:    Pt is living w 2/4 grandbabies.    Upper respiratory infection Review:  Jud Patel is a 54 y.o. female who complains of non- productive cough,  for the past 3 weeks.  Pt denies ill contact/post nasal drip/wheezing/nasal congestion/malaise/conact w children/GERD  She denies a history of shortness of breath.  Evaluation to date: none.   Treatment to date: OTC products.  Relevant PMH: No pertinent additional PMH.      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 Ashton     Lab Results   Component Value Date    TSH 32.200 (H) 03/18/2025    T4FREE 1.02 03/18/2025         ANNABELLA-pt uses CPAP     Vit d def-  Lab Results   Component Value Date/Time    VITD25 6.8 03/18/2025 11:47 AM         H/o cigarette smoker- quit 2019      Anxiety- improved on buspar and sertraline     Morbid obesity-pt agrees to meet with bariatrics  Wt Readings from Last 3 Encounters:   05/29/25 122.8 kg (270 lb 12.8 oz)   04/02/25 119.9 kg (264 lb 4.8 oz)   02/28/25 122 kg (269 lb)        Chronic lbp w radiculopathy-pt reports that gabapentin 300mg

## 2025-06-03 ENCOUNTER — OFFICE VISIT (OUTPATIENT)
Age: 55
End: 2025-06-03
Payer: MEDICAID

## 2025-06-03 VITALS
HEIGHT: 64 IN | BODY MASS INDEX: 46.44 KG/M2 | OXYGEN SATURATION: 94 % | HEART RATE: 64 BPM | SYSTOLIC BLOOD PRESSURE: 137 MMHG | RESPIRATION RATE: 20 BRPM | DIASTOLIC BLOOD PRESSURE: 82 MMHG | TEMPERATURE: 98.6 F | WEIGHT: 272 LBS

## 2025-06-03 DIAGNOSIS — Z01.419 ENCOUNTER FOR WELL WOMAN EXAM: ICD-10-CM

## 2025-06-03 DIAGNOSIS — Z12.4 SCREENING FOR CERVICAL CANCER: Primary | ICD-10-CM

## 2025-06-03 DIAGNOSIS — Z12.31 ENCOUNTER FOR SCREENING MAMMOGRAM FOR MALIGNANT NEOPLASM OF BREAST: ICD-10-CM

## 2025-06-03 PROCEDURE — 99386 PREV VISIT NEW AGE 40-64: CPT | Performed by: ADVANCED PRACTICE MIDWIFE

## 2025-06-03 PROCEDURE — 3075F SYST BP GE 130 - 139MM HG: CPT | Performed by: ADVANCED PRACTICE MIDWIFE

## 2025-06-03 PROCEDURE — 3079F DIAST BP 80-89 MM HG: CPT | Performed by: ADVANCED PRACTICE MIDWIFE

## 2025-06-03 NOTE — PROGRESS NOTES
Jud Patel is a 54 y.o. female returns for an annual exam     Chief Complaint   Patient presents with    New Patient    Annual Exam      -  x 3, SAB x2 (one baby  after 3 days)    No LMP recorded. (Menstrual status: Menopause). Novasure back in     SA: not currently  STD: declines   Last Pap: due, no hx of abnormal pap  Last Mammogram: 2022 BI-RADS 1 neg  Last Bone Density: n/a  Last colonoscopy: 2019, will go back in       1. Have you been to the ER, urgent care clinic, or hospitalized since your last visit? new    2. Have you seen or consulted any other health care providers outside of the [facility] since your last visit? new    Examination chaperoned by     Alana Valenzuela LPN.

## 2025-06-03 NOTE — PROGRESS NOTES
Annual exam    Jud Patel is a 54 y.o.  presenting for annual exam. Her main concerns today include    Ob/Gyn Hx:  G P  -   LMP - No LMP recorded. (Menstrual status: Menopause).  Menses - none; with the following concerns: negative  Contraception - no method  SA -  no     Health maintenance:  Pap - Last PAP was normal;  Mammo - Mammogram was normal, /. and Patient does not recall date of the last mammogram.  Colonoscopy - Last colonoscopy was 2019      Past Medical History:   Diagnosis Date    Chronic kidney disease     kidney failure-left    Depression     GERD (gastroesophageal reflux disease)     Hypertension     Hypothyroidism following radioiodine therapy     2009    Liver disease     Morbid obesity (HCC)     Nausea & vomiting     Obstructive sleep apnea (adult) (pediatric) 1/8/2013    Thyroid disease        Past Surgical History:   Procedure Laterality Date    CHOLECYSTECTOMY      COLONOSCOPY N/A 7/12/2019    COLONOSCOPY performed by Elliot Vásquez MD at [facility] ENDOSCOPY    GYN  2005    [place]    ORTHOPEDIC SURGERY      Left arm    UROLOGICAL SURGERY      bladder sling       Family History   Problem Relation Age of Onset    Stroke Father     Heart Attack Father     Diabetes Sister     Other Father     Hypertension Mother     Emphysema Mother     Osteoporosis Father     Hypertension Father     Hypertension Brother        Social History     Socioeconomic History    Marital status: Single     Spouse name: Not on file    Number of children: Not on file    Years of education: Not on file    Highest education level: Not on file   Occupational History    Not on file   Tobacco Use    Smoking status: Former     Current packs/day: 0.25     Types: Cigarettes    Smokeless tobacco: Never   Vaping Use    Vaping status: Never Used   Substance and Sexual Activity    Alcohol use: Not Currently    Drug use: No    Sexual activity: Not Currently   Other Topics Concern    Not on file   Social History Narrative    Not

## 2025-06-08 DIAGNOSIS — F41.9 ANXIETY DISORDER, UNSPECIFIED TYPE: ICD-10-CM

## 2025-06-08 LAB
CYTOLOGIST CVX/VAG CYTO: NORMAL
CYTOLOGY CVX/VAG DOC CYTO: NORMAL
CYTOLOGY CVX/VAG DOC THIN PREP: NORMAL
DX ICD CODE: NORMAL
HPV GENOTYPE REFLEX: NORMAL
HPV I/H RISK 4 DNA CVX QL PROBE+SIG AMP: NEGATIVE
Lab: NORMAL
OTHER STN SPEC: NORMAL
SERVICE CMNT-IMP: NORMAL
SPECIMEN STATUS REPORT: NORMAL
STAT OF ADQ CVX/VAG CYTO-IMP: NORMAL

## 2025-06-09 ENCOUNTER — RESULTS FOLLOW-UP (OUTPATIENT)
Age: 55
End: 2025-06-09

## 2025-06-11 RX ORDER — BUSPIRONE HYDROCHLORIDE 7.5 MG/1
7.5 TABLET ORAL 3 TIMES DAILY
Qty: 270 TABLET | Refills: 1 | Status: SHIPPED | OUTPATIENT
Start: 2025-06-11

## 2025-07-01 DIAGNOSIS — E21.3 HYPERPARATHYROIDISM: ICD-10-CM

## 2025-07-01 DIAGNOSIS — E83.52 HYPERCALCEMIA: ICD-10-CM

## 2025-07-01 DIAGNOSIS — E89.0 POSTABLATIVE HYPOTHYROIDISM: ICD-10-CM

## 2025-07-08 ENCOUNTER — OFFICE VISIT (OUTPATIENT)
Facility: CLINIC | Age: 55
End: 2025-07-08

## 2025-07-08 VITALS
HEART RATE: 71 BPM | RESPIRATION RATE: 20 BRPM | TEMPERATURE: 97.8 F | WEIGHT: 279.6 LBS | DIASTOLIC BLOOD PRESSURE: 110 MMHG | BODY MASS INDEX: 47.73 KG/M2 | OXYGEN SATURATION: 98 % | HEIGHT: 64 IN | SYSTOLIC BLOOD PRESSURE: 140 MMHG

## 2025-07-08 DIAGNOSIS — E89.0 HYPOTHYROIDISM FOLLOWING RADIOIODINE THERAPY: ICD-10-CM

## 2025-07-08 DIAGNOSIS — G47.33 OSA (OBSTRUCTIVE SLEEP APNEA): ICD-10-CM

## 2025-07-08 DIAGNOSIS — E11.9 TYPE 2 DIABETES MELLITUS WITHOUT COMPLICATION, WITHOUT LONG-TERM CURRENT USE OF INSULIN (HCC): Primary | ICD-10-CM

## 2025-07-08 DIAGNOSIS — I10 PRIMARY HYPERTENSION: ICD-10-CM

## 2025-07-08 DIAGNOSIS — F41.9 ANXIETY DISORDER, UNSPECIFIED TYPE: ICD-10-CM

## 2025-07-08 DIAGNOSIS — E66.01 MORBID OBESITY (HCC): ICD-10-CM

## 2025-07-08 LAB
GLUCOSE, POC: 132 MG/DL
HBA1C MFR BLD: 6.6 %

## 2025-07-08 PROCEDURE — 3077F SYST BP >= 140 MM HG: CPT | Performed by: INTERNAL MEDICINE

## 2025-07-08 PROCEDURE — 83036 HEMOGLOBIN GLYCOSYLATED A1C: CPT | Performed by: INTERNAL MEDICINE

## 2025-07-08 PROCEDURE — 3044F HG A1C LEVEL LT 7.0%: CPT | Performed by: INTERNAL MEDICINE

## 2025-07-08 PROCEDURE — 99214 OFFICE O/P EST MOD 30 MIN: CPT | Performed by: INTERNAL MEDICINE

## 2025-07-08 PROCEDURE — 82962 GLUCOSE BLOOD TEST: CPT | Performed by: INTERNAL MEDICINE

## 2025-07-08 PROCEDURE — 3080F DIAST BP >= 90 MM HG: CPT | Performed by: INTERNAL MEDICINE

## 2025-07-08 ASSESSMENT — PATIENT HEALTH QUESTIONNAIRE - PHQ9
7. TROUBLE CONCENTRATING ON THINGS, SUCH AS READING THE NEWSPAPER OR WATCHING TELEVISION: MORE THAN HALF THE DAYS
8. MOVING OR SPEAKING SO SLOWLY THAT OTHER PEOPLE COULD HAVE NOTICED. OR THE OPPOSITE, BEING SO FIGETY OR RESTLESS THAT YOU HAVE BEEN MOVING AROUND A LOT MORE THAN USUAL: NOT AT ALL
2. FEELING DOWN, DEPRESSED OR HOPELESS: MORE THAN HALF THE DAYS
SUM OF ALL RESPONSES TO PHQ QUESTIONS 1-9: 14
10. IF YOU CHECKED OFF ANY PROBLEMS, HOW DIFFICULT HAVE THESE PROBLEMS MADE IT FOR YOU TO DO YOUR WORK, TAKE CARE OF THINGS AT HOME, OR GET ALONG WITH OTHER PEOPLE: SOMEWHAT DIFFICULT
3. TROUBLE FALLING OR STAYING ASLEEP: MORE THAN HALF THE DAYS
6. FEELING BAD ABOUT YOURSELF - OR THAT YOU ARE A FAILURE OR HAVE LET YOURSELF OR YOUR FAMILY DOWN: MORE THAN HALF THE DAYS
SUM OF ALL RESPONSES TO PHQ QUESTIONS 1-9: 14
1. LITTLE INTEREST OR PLEASURE IN DOING THINGS: MORE THAN HALF THE DAYS
SUM OF ALL RESPONSES TO PHQ QUESTIONS 1-9: 14
SUM OF ALL RESPONSES TO PHQ QUESTIONS 1-9: 14
5. POOR APPETITE OR OVEREATING: MORE THAN HALF THE DAYS
9. THOUGHTS THAT YOU WOULD BE BETTER OFF DEAD, OR OF HURTING YOURSELF: NOT AT ALL
4. FEELING TIRED OR HAVING LITTLE ENERGY: MORE THAN HALF THE DAYS

## 2025-07-08 NOTE — PROGRESS NOTES
Jud Patel is a 54 y.o. female and presents with   Chief Complaint   Patient presents with    Hypertension    Prediabetes      .  Subjective:    Pt is living w 2/4 grandbabies.  Pt is currently uninsured. 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 Encouraged pt to apply for carecard  Pt did not schedule mammo-referrals given to her by myself and her gyn provider    PM-HTN-on  olmesartan/hctz 40/25 and amlodipine 10mg     BP Readings from Last 3 Encounters:   07/08/25 (!) 140/110   06/03/25 137/82   05/29/25 (!) 147/92       Diabetes-pts A1c has increased and is now in DM range. 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 Ashton. Has not completed labs ordered     Lab Results   Component Value Date    TSH 32.200 (H) 03/18/2025    T4FREE 1.02 03/18/2025         ANNABELLA-pt uses CPAP     Vit d def-  Lab Results   Component Value Date/Time    VITD25 6.8 03/18/2025 11:47 AM         H/o cigarette smoker- quit 2019      Anxiety- improved on buspar and sertraline     Morbid obesity-pt agreed to meet with bariatrics, but has yet to.  Wt Readings from Last 3 Encounters:   07/08/25 126.8 kg (279 lb 9.6 oz)   06/03/25 123.4 kg (272 lb)   05/29/25 122.8 kg (270 lb 12.8 oz)        Chronic lbp w radiculopathy-pt reports that gabapentin 300mg TID relieves her back pain, but not the neuropathy    -pt declines maxing dose of gabapentin due to SE     -pt declines pain management referral      H/o CKD 3-now nml  Lab Results   Component Value

## 2025-07-08 NOTE — PROGRESS NOTES
Chief Complaint   Patient presents with    Hypertension    Prediabetes     Have you been to the ER, urgent care clinic since your last visit?  Hospitalized since your last visit?   NO    Have you seen or consulted any other health care providers outside our system since your last visit?   NO    Have you had a mammogram?”   NO    Date of last Mammogram: 9/27/2022            HIPPA confirmed by two patient identifiers.

## 2025-07-09 PROBLEM — E11.9 TYPE 2 DIABETES MELLITUS WITHOUT COMPLICATION, WITHOUT LONG-TERM CURRENT USE OF INSULIN (HCC): Status: ACTIVE | Noted: 2025-07-09

## 2025-08-11 RX ORDER — CYCLOBENZAPRINE HCL 10 MG
TABLET ORAL
Qty: 21 TABLET | Refills: 0 | Status: SHIPPED | OUTPATIENT
Start: 2025-08-11

## 2025-08-29 LAB
1,25(OH)2D SERPL-MCNC: 85.5 PG/ML (ref 24.8–81.5)
25(OH)D3+25(OH)D2 SERPL-MCNC: 27.4 NG/ML (ref 30–100)
ALBUMIN SERPL-MCNC: 4.5 G/DL (ref 3.8–4.9)
BUN SERPL-MCNC: 15 MG/DL (ref 6–24)
BUN/CREAT SERPL: 10 (ref 9–23)
CALCIUM SERPL-MCNC: 9.7 MG/DL (ref 8.7–10.2)
CHLORIDE SERPL-SCNC: 102 MMOL/L (ref 96–106)
CO2 SERPL-SCNC: 23 MMOL/L (ref 20–29)
CREAT SERPL-MCNC: 1.46 MG/DL (ref 0.57–1)
EGFRCR SERPLBLD CKD-EPI 2021: 42 ML/MIN/1.73
GLUCOSE SERPL-MCNC: 107 MG/DL (ref 70–99)
PHOSPHATE SERPL-MCNC: 2.7 MG/DL (ref 3–4.3)
POTASSIUM SERPL-SCNC: 4.6 MMOL/L (ref 3.5–5.2)
PTH-INTACT SERPL-MCNC: 75 PG/ML (ref 15–65)
REPORT: NORMAL
SODIUM SERPL-SCNC: 141 MMOL/L (ref 134–144)
T4 FREE SERPL-MCNC: 0.25 NG/DL (ref 0.82–1.77)
T4 SERPL-MCNC: 2.2 UG/DL (ref 4.5–12)
TSH SERPL DL<=0.005 MIU/L-ACNC: 41.2 UIU/ML (ref 0.45–4.5)

## 2025-09-02 ENCOUNTER — OFFICE VISIT (OUTPATIENT)
Age: 55
End: 2025-09-02
Payer: COMMERCIAL

## 2025-09-02 VITALS
WEIGHT: 280.4 LBS | DIASTOLIC BLOOD PRESSURE: 86 MMHG | HEART RATE: 81 BPM | HEIGHT: 64 IN | BODY MASS INDEX: 47.87 KG/M2 | SYSTOLIC BLOOD PRESSURE: 126 MMHG

## 2025-09-02 DIAGNOSIS — E83.52 HYPERCALCEMIA: ICD-10-CM

## 2025-09-02 DIAGNOSIS — E11.9 TYPE 2 DIABETES MELLITUS WITHOUT COMPLICATION, WITHOUT LONG-TERM CURRENT USE OF INSULIN (HCC): ICD-10-CM

## 2025-09-02 DIAGNOSIS — E89.0 POSTABLATIVE HYPOTHYROIDISM: Primary | ICD-10-CM

## 2025-09-02 PROCEDURE — 3074F SYST BP LT 130 MM HG: CPT | Performed by: INTERNAL MEDICINE

## 2025-09-02 PROCEDURE — 99214 OFFICE O/P EST MOD 30 MIN: CPT | Performed by: INTERNAL MEDICINE

## 2025-09-02 PROCEDURE — 3044F HG A1C LEVEL LT 7.0%: CPT | Performed by: INTERNAL MEDICINE

## 2025-09-02 PROCEDURE — 3079F DIAST BP 80-89 MM HG: CPT | Performed by: INTERNAL MEDICINE

## 2025-09-02 RX ORDER — ERGOCALCIFEROL 1.25 MG/1
50000 CAPSULE, LIQUID FILLED ORAL WEEKLY
Qty: 12 CAPSULE | Refills: 3 | Status: SHIPPED | OUTPATIENT
Start: 2025-09-02

## 2025-09-02 RX ORDER — LEVOTHYROXINE SODIUM 125 UG/1
125 TABLET ORAL DAILY
Qty: 90 TABLET | Refills: 1 | Status: SHIPPED | OUTPATIENT
Start: 2025-09-02

## (undated) DEVICE — Device

## (undated) DEVICE — SOLIDIFIER MEDC 1200ML -- CONVERT TO 356117

## (undated) DEVICE — NEEDLE HYPO 18GA L1.5IN PNK S STL HUB POLYPR SHLD REG BVL

## (undated) DEVICE — CONTAINER SPEC 20 ML LID NEUT BUFF FORMALIN 10 % POLYPR STS

## (undated) DEVICE — SYR 10ML LUER LOK 1/5ML GRAD --

## (undated) DEVICE — TRAP SUC MUCOUS 70ML -- MEDICHOICE MEDLINE

## (undated) DEVICE — CATH IV AUTOGRD BC BLU 22GA 25 -- INSYTE

## (undated) DEVICE — TOWEL 4 PLY TISS 19X30 SUE WHT

## (undated) DEVICE — 1200 GUARD II KIT W/5MM TUBE W/O VAC TUBE: Brand: GUARDIAN

## (undated) DEVICE — STRAINER URIN CALC RNL MSH -- CONVERT TO ITEM 357634

## (undated) DEVICE — SYR 3ML LL TIP 1/10ML GRAD --

## (undated) DEVICE — BASIN EMSIS 16OZ GRAPHITE PLAS KID SHP MOLD GRAD FOR ORAL

## (undated) DEVICE — SNARE ENDOSCP M L240CM W27MM SHTH DIA2.4MM CHN 2.8MM OVL

## (undated) DEVICE — SET ADMIN 16ML TBNG L100IN 2 Y INJ SITE IV PIGGY BK DISP

## (undated) DEVICE — KENDALL RADIOLUCENT FOAM MONITORING ELECTRODE RECTANGULAR SHAPE: Brand: KENDALL

## (undated) DEVICE — Z DISCONTINUED PER MEDLINE LINE GAS SAMPLING O2/CO2 LNG AD 13 FT NSL W/ TBNG FILTERLINE

## (undated) DEVICE — NEONATAL-ADULT SPO2 SENSOR: Brand: NELLCOR

## (undated) DEVICE — NON-REM POLYHESIVE PATIENT RETURN ELECTRODE: Brand: VALLEYLAB